# Patient Record
Sex: FEMALE | Race: WHITE | NOT HISPANIC OR LATINO | Employment: OTHER | URBAN - METROPOLITAN AREA
[De-identification: names, ages, dates, MRNs, and addresses within clinical notes are randomized per-mention and may not be internally consistent; named-entity substitution may affect disease eponyms.]

---

## 2017-01-03 NOTE — TELEPHONE ENCOUNTER
Was the patient seen in the last year in this department? Yes     Does patient have an active prescription for medications requested? No     Received Request Via: Pharmacy      Pt met protocol?: Yes    LAST OV 12/02/16

## 2017-01-04 RX ORDER — OMEPRAZOLE 40 MG/1
CAPSULE, DELAYED RELEASE ORAL
Qty: 90 CAP | Refills: 1 | Status: SHIPPED | OUTPATIENT
Start: 2017-01-04 | End: 2017-07-04 | Stop reason: SDUPTHER

## 2017-01-10 ENCOUNTER — NON-PROVIDER VISIT (OUTPATIENT)
Dept: MEDICAL GROUP | Facility: PHYSICIAN GROUP | Age: 67
End: 2017-01-10
Payer: MEDICARE

## 2017-01-10 DIAGNOSIS — Z23 NEED FOR VACCINATION: ICD-10-CM

## 2017-01-10 PROCEDURE — G0008 ADMIN INFLUENZA VIRUS VAC: HCPCS | Performed by: NURSE PRACTITIONER

## 2017-01-10 PROCEDURE — 90662 IIV NO PRSV INCREASED AG IM: CPT | Performed by: NURSE PRACTITIONER

## 2017-01-10 NOTE — MR AVS SNAPSHOT
Jacinda Haroon   1/10/2017 10:15 AM   Non-Provider Visit   MRN: [id number]    Department:  [facility]   Dept Phone:  [phone]    Description:  Female : 1950   Provider:  FLAKO RAE GRP, MA           Allergies as of 1/10/2017     No Known Allergies      You were diagnosed with     Need for vaccination   [[id number]]         Vital Signs     Smoking Status                   Never Smoker            Basic Information     Date Of Birth Sex Race Ethnicity Preferred Language    1950 Female White Non- English      Your appointments     2017  3:20 PM   Urgent/Same Day with Arcelia Walton M.D.   [facility] ([place])    [address] [address] [phone]           You will be receiving a confirmation call a few days before your appointment from our automated call confirmation system.            2017 12:40 PM   Established Patient Pul with A Rotation   [facility] (--)    [address] [facility] [address]  [facility] [address] [phone]              Problem List              ICD-10-CM Priority Class Noted - Resolved    H/O breast surgery Z98.890   3/12/2015 - Present    HLD (hyperlipidemia) E78.5   2015 - Present    Hormone replacement therapy (postmenopausal) Z79.890   2015 - Present    Thalassemia minor D56.3   2015 - Present    GERD (gastroesophageal reflux disease) K21.9   2016 - Present    Hoarseness R49.0   2016 - Present    Mild intermittent asthma without complication J45.20   2016 - Present      Health Maintenance        Date Due Completion Dates    IMM INFLUENZA (1) 2016 10/24/2015    MAMMOGRAM 2017, 2015, 2015, 2015, 2015, 2015, 2015, 2015, 2015, 2015, 2015, 2015, 2014 (Done)    Override on 2014: Done (with diagnostic due to breast pain, Left, but no abnormalities)    IMM DTaP/Tdap/Td Vaccine (2 - Td)  2/11/2019 2/11/2009    PAP SMEAR 6/7/2019 6/7/2016, 6/7/2016 (Done)    Override on 6/7/2016: Done    COLONOSCOPY 7/20/2020 7/20/2010 (Done), 7/13/2005 (Done)    Override on 7/20/2010: Done (est date)    Override on 7/13/2005: Done    BONE DENSITY 8/3/2020 8/3/2015, 8/12/2009 (Done)    Override on 8/12/2009: Done            Current Immunizations     13-VALENT PCV PREVNAR 12/3/2015    Influenza Vaccine Adult HD  Incomplete, 10/24/2015    Pneumococcal polysaccharide vaccine (PPSV-23) 7/17/2014    SHINGLES VACCINE 9/22/2009    Tdap Vaccine 2/11/2009      Below and/or attached are the medications your provider expects you to take. Review all of your home medications and newly ordered medications with your provider and/or pharmacist. Follow medication instructions as directed by your provider and/or pharmacist. Please keep your medication list with you and share with your provider. Update the information when medications are discontinued, doses are changed, or new medications (including over-the-counter products) are added; and carry medication information at all times in the event of emergency situations     Allergies:  No Known Allergies          Medications  Valid as of: January 10, 2017 - 10:50 AM    Generic Name Brand Name Tablet Size Instructions for use    Albuterol Sulfate (Aero Soln) albuterol 108 (90 BASE) MCG/ACT Inhale 2 Puffs by mouth every 6 hours as needed for Shortness of Breath.        Aspirin (Tablet Delayed Response) ECOTRIN 81 MG Take 81 mg by mouth every day.        Biotin   Take 6,000 mcg by mouth.        Budesonide-Formoterol Fumarate (Aerosol) SYMBICORT 160-4.5 MCG/ACT Take 2 Inhalation by mouth 2 Times a Day.        Cholecalciferol   Take 5,000 Units by mouth.        Coenzyme Q10   Take  by mouth. Liquid  100g        Cyanocobalamin   Take  by mouth.        CycloSPORINE (Emulsion) RESTASIS 0.05 % Place 1 Drop in both eyes 2 times a day.        Estradiol-Norethindrone Acet (Tab)  Estradiol-Norethindrone Acet 0.5-0.1 MG TAKE 1 TABLET DAILY        Flaxseed (Linseed)   Take  by mouth.        Ibuprofen (Tab) MOTRIN 600 MG Take 600 mg by mouth every 6 hours as needed.        Montelukast Sodium (Tab) SINGULAIR 10 MG TAKE 1 TABLET DAILY        Nutritional Supplements   Take  by mouth. Estadiol/noreth 1/0.5mg .1/2        Omeprazole (CAPSULE DELAYED RELEASE) PRILOSEC 40 MG TAKE 1 CAPSULE DAILY        Polyethylene Glycol 3350 (Powder) MIRALAX  Take 17 g by mouth every day.        Simvastatin (Tab) ZOCOR 20 MG TAKE 1 TABLET EVERY EVENING        Triazolam (Tab) HALCION 0.125 MG Take 1 Tab by mouth at bedtime as needed.        .                 Medicines prescribed today were sent to:     [facility] - [place], [place] - [address] [address]    Phone: [phone] Fax: [phone]    Open 24 Hours?: No    [facility] - [place] [address]    Phone: [phone] Fax: [phone]    Open 24 Hours?: No    [facility] - [place] [address]    Phone: [phone] Fax: [phone]    Open 24 Hours?: No      Medication refill instructions:       If your prescription bottle indicates you have medication refills left, it is not necessary to call your provider’s office. Please contact your pharmacy and they will refill your medication.    If your prescription bottle indicates you do not have any refills left, you may request refills at any time through one of the following ways: The online [employer] system (except Urgent Care), by calling your provider’s office, or by asking your pharmacy to contact your provider’s office with a refill request. Medication refills are processed only during regular business hours and may not be available until the next business day. Your provider may request additional information or to have a  follow-up visit with you prior to refilling your medication.   *Please Note: Medication refills are assigned a new Rx number when refilled electronically. Your pharmacy may indicate that no refills were authorized even though a new prescription for the same medication is available at the pharmacy. Please request the medicine by name with the pharmacy before contacting your provider for a refill.           [employer] Access Code: Activation code not generated  Current [place] [employer] Status: Active

## 2017-01-10 NOTE — PROGRESS NOTES
"Jacinda Patiño is a 66 y.o. female here for a non-provider visit for:   FLU    Reason for immunization: Annual Flu Vaccine  Immunization records indicate need for vaccine: Yes  Minimum interval has been met for this vaccine: Yes  ABN completed: Yes    VIS Dated   was given to patient Yes  All IAC Questionnaire questions were answered “No.\"    Patient tolerated injection and no adverse effects were observed or reported yes.    Pt scheduled for next dose in series: Not Indicated        "

## 2017-01-11 ENCOUNTER — TELEPHONE (OUTPATIENT)
Dept: MEDICAL GROUP | Facility: PHYSICIAN GROUP | Age: 67
End: 2017-01-11

## 2017-01-11 DIAGNOSIS — K40.20 BILATERAL INGUINAL HERNIA WITHOUT OBSTRUCTION OR GANGRENE, RECURRENCE NOT SPECIFIED: ICD-10-CM

## 2017-01-12 NOTE — TELEPHONE ENCOUNTER
Ok referral has been submitted to our referrals department, please allow 7-10 business days for them to get authorization from you insurance.

## 2017-01-12 NOTE — TELEPHONE ENCOUNTER
1. Caller Name: [facility]                                          Call Back Number:       Patient approves a detailed voicemail message: N\A    2. SPECIFIC Action To Be Taken: Referral pending, please sign.    3. Diagnosis/Clinical Reason for Request: hernia    4. Specialty & Provider Name/Lab/Imaging Location: [facility]    5. Is appointment scheduled for requested order/referral: no    Patient informed they will receive a return phone call from the office ONLY if there are any questions before processing their request. Advised to call back if they haven't received a call from the referral department in 5 days.

## 2017-01-26 ENCOUNTER — OFFICE VISIT (OUTPATIENT)
Dept: PULMONOLOGY | Facility: HOSPICE | Age: 67
End: 2017-01-26
Payer: MEDICARE

## 2017-01-26 VITALS
SYSTOLIC BLOOD PRESSURE: 136 MMHG | RESPIRATION RATE: 14 BRPM | HEIGHT: 64 IN | WEIGHT: 162 LBS | TEMPERATURE: 98.4 F | HEART RATE: 68 BPM | BODY MASS INDEX: 27.66 KG/M2 | OXYGEN SATURATION: 97 % | DIASTOLIC BLOOD PRESSURE: 80 MMHG

## 2017-01-26 DIAGNOSIS — J45.20 MILD INTERMITTENT ASTHMA WITHOUT COMPLICATION: ICD-10-CM

## 2017-01-26 PROCEDURE — G8420 CALC BMI NORM PARAMETERS: HCPCS | Performed by: INTERNAL MEDICINE

## 2017-01-26 PROCEDURE — 3014F SCREEN MAMMO DOC REV: CPT | Performed by: INTERNAL MEDICINE

## 2017-01-26 PROCEDURE — G8482 FLU IMMUNIZE ORDER/ADMIN: HCPCS | Performed by: INTERNAL MEDICINE

## 2017-01-26 PROCEDURE — 4040F PNEUMOC VAC/ADMIN/RCVD: CPT | Performed by: INTERNAL MEDICINE

## 2017-01-26 PROCEDURE — 99214 OFFICE O/P EST MOD 30 MIN: CPT | Performed by: INTERNAL MEDICINE

## 2017-01-26 PROCEDURE — 1036F TOBACCO NON-USER: CPT | Performed by: INTERNAL MEDICINE

## 2017-01-26 PROCEDURE — 3017F COLORECTAL CA SCREEN DOC REV: CPT | Performed by: INTERNAL MEDICINE

## 2017-01-26 PROCEDURE — G8432 DEP SCR NOT DOC, RNG: HCPCS | Performed by: INTERNAL MEDICINE

## 2017-01-26 PROCEDURE — 1101F PT FALLS ASSESS-DOCD LE1/YR: CPT | Performed by: INTERNAL MEDICINE

## 2017-01-26 RX ORDER — BUDESONIDE AND FORMOTEROL FUMARATE DIHYDRATE 80; 4.5 UG/1; UG/1
2 AEROSOL RESPIRATORY (INHALATION) 2 TIMES DAILY
Qty: 1 INHALER | Refills: 4 | Status: SHIPPED
Start: 2017-01-26 | End: 2017-02-13 | Stop reason: SDUPTHER

## 2017-01-26 NOTE — PROGRESS NOTES
Jacinda Patiño is a 66 y.o. female here for reactive airways with bronchopneumonia in July of last year. Patient was referred by her primary care doctor.    History of Present Illness:      This lady was doing well on maintenance steroid inhaler in July, then developed an infection and required a Z-Lalo and a steroid Dosepak. I rewrote those prescriptions that she likes to keep them on hand.    She also prefers Symbicort, Advair was not as beneficial, her physician in [place] was able to have that changed and justified, she would like the lower dosing and I changed her to Symbicort 80. We are not able to send it by E Scrip to [facility], so we printed it and are faxing it for the Scrip long-term prescription.    IgE and allergy testing was not remarkable, she does indicate a sensitivity to mites and danders. Currently she is well controlled back to her baseline state. Recheck in 6 months    Constitutional ROS: No unexpected change in weight, No unexplained fevers, sweats, or chills  Eyes: No change in vision or blurring or double vision  Mouth/Throat ROS: No sore throat, No recent change in voice or hoarseness  Pulmonary ROS: See present history for pertinent positives  Cardiovascular ROS: No chest pain  Gastrointestinal ROS: No abdominal pain, No abdominal bloating or early satiety  Musculoskeletal/Extremities ROS: No clubbing, No cyanosis  Hematologic/Lymphatic ROS: No abnormal bleeding  Neurologic ROS: No weakness  Psychiatric ROS: No depression  Allergic/Immunologic: No rhinitis or urticaria     Current Outpatient Prescriptions   Medication Sig Dispense Refill   • Naproxen (NAPROSYN PO) Take  by mouth.     • budesonide-formoterol (SYMBICORT) 80-4.5 MCG/ACT Aerosol Inhale 2 Puffs by mouth 2 Times a Day. Use spacer. Rinse mouth after each use. 1 Inhaler 4   • omeprazole (PRILOSEC) 40 MG delayed-release capsule TAKE 1 CAPSULE DAILY 90 Cap 1   • simvastatin (ZOCOR) 20 MG Tab TAKE 1 TABLET EVERY EVENING 90 Tab 0    • montelukast (SINGULAIR) 10 MG Tab TAKE 1 TABLET DAILY 90 Tab 0   • SYMBICORT 160-4.5 MCG/ACT Aerosol Take 2 Inhalation by mouth 2 Times a Day.     • triazolam (HALCION) 0.125 MG tablet Take 1 Tab by mouth at bedtime as needed. 45 Tab 0   • Estradiol-Norethindrone Acet 0.5-0.1 MG Tab TAKE 1 TABLET DAILY 84 Tab 1   • cyclosporin (RESTASIS) 0.05 % ophthalmic emulsion Place 1 Drop in both eyes 2 times a day.     • polyethylene glycol 3350 (MIRALAX) Powder Take 17 g by mouth every day.     • albuterol (VENTOLIN OR PROVENTIL) 108 (90 BASE) MCG/ACT Aero Soln inhalation aerosol Inhale 2 Puffs by mouth every 6 hours as needed for Shortness of Breath.     • ibuprofen (MOTRIN) 600 MG TABS Take 600 mg by mouth every 6 hours as needed.     • Nutritional Supplements (HRT SUPPORT PO) Take  by mouth. Estadiol/noreth 1/0.5mg .1/2     • VITAMIN D, CHOLECALCIFEROL, PO Take 5,000 Units by mouth.     • aspirin EC (ECOTRIN) 81 MG TBEC Take 81 mg by mouth every day.     • BIOTIN PO Take 6,000 mcg by mouth.     • Cyanocobalamin (VITAMIN B 12 PO) Take  by mouth.     • Flaxseed, Linseed, (FLAXSEED OIL PO) Take  by mouth.     • Coenzyme Q10 (CO Q 10 PO) Take  by mouth. Liquid  100g       No current facility-administered medications for this visit.       Social History   Substance Use Topics   • Smoking status: Never Smoker    • Smokeless tobacco: Never Used   • Alcohol Use: No        Past Medical History   Diagnosis Date   • HLD (hyperlipidemia) 7/7/2015   • Mild intermittent asthma without complication 7/19/2016       Past Surgical History   Procedure Laterality Date   • Lumpectomy Left 4/2012   • Basal cell excision  2010     Forehead    • Blepharoplasty  2010   • Shoulder arthroscopy  1992   • Laparoscopy  1984     AGA   • Hernia repair  1990     inguinal    • Knee arthroscopy Right 2015   • Pr remv 2nd cataract,corn-scler sectn         Allergies: Review of patient's allergies indicates no known allergies.    Family History   Problem  "Relation Age of Onset   • Heart Attack Mother    • Heart Disease Mother    • Hyperlipidemia Mother    • Hypertension Mother        Physical Examination    Filed Vitals:    01/26/17 1238   Height: 1.631 m (5' 4.2\")   Weight: 73.483 kg (162 lb)   Weight % change since last entry.: 0 %   BP: 136/80   Pulse: 68   BMI (Calculated): 27.63   Resp: 14   Temp: 36.9 °C (98.4 °F)       General Appearance: alert, no distress  Skin: Skin color, texture, turgor normal. No rashes or lesions.  Eyes: negative  Oropharynx: Lips, mucosa, and tongue normal. Teeth and gums normal. Oropharynx moist and without lesion  Lungs: positive findings: Clear  Heart: negative. RRR without murmur, gallop, or rubs.  No ectopy.  Abdomen: Abdomen soft, non-tender. BS normal. No masses,  No organomegaly  Extremities: Extremities normal. No deformities, edema, or skin discoloration  Peripheral Pulses: Normal  Neurologic: intact  No cervical adenopathy    II (soft palate, uvula, fauces visible)    Imaging: None presently    PFTS: None presently      Assessment and Plan  1. Mild intermittent asthma without complication  - budesonide-formoterol (SYMBICORT) 80-4.5 MCG/ACT Aerosol; Inhale 2 Puffs by mouth 2 Times a Day. Use spacer. Rinse mouth after each use.  Dispense: 1 Inhaler; Refill: 4    This lady was doing well on maintenance steroid inhaler in July, then developed an infection and required a Z-Lalo and a steroid Dosepak. I rewrote those prescriptions that she likes to keep them on hand.    She also prefers Symbicort, Advair was not as beneficial, her physician in [place] was able to have that changed and justified, she would like the lower dosing and I changed her to Symbicort 80. We are not able to send it by E Scrip to [facility], so we printed it and are faxing it for the Scrip long-term prescription.    IgE and allergy testing was not remarkable, she does indicate a sensitivity to mites and danders. Currently she is well controlled back to " her baseline state. Recheck in 6 months  Followup 6 mos

## 2017-01-26 NOTE — PATIENT INSTRUCTIONS
This lady was doing well on maintenance steroid inhaler in July, then developed an infection and required a Z-Lalo and a steroid Dosepak. I rewrote those prescriptions that she likes to keep them on hand.    She also prefers Symbicort, Advair was not as beneficial, her physician in [place] was able to have that changed and justified, she would like the lower dosing and I changed her to Symbicort 80. We are not able to send it by E Scrip to [facility], so we printed it and are faxing it for the Scrip long-term prescription.    IgE and allergy testing was not remarkable, she does indicate a sensitivity to mites and danders. Currently she is well controlled back to her baseline state. Recheck in 6 months

## 2017-01-26 NOTE — MR AVS SNAPSHOT
"Jacinda Patiño   2017 12:40 PM   Office Visit   MRN: [id number]    Department:  [facility]   Dept Phone:  [phone]    Description:  Female : 1950   Provider:  Elizabeth Abdullahi M.D.           Reason for Visit     Follow-Up Asthma      Allergies as of 2017     No Known Allergies      You were diagnosed with     Mild intermittent asthma without complication   [214153]         Vital Signs     Blood Pressure Pulse Temperature Respirations Height Weight    136/80 mmHg 68 36.9 °C (98.4 °F) 14 1.631 m (5' 4.2\") 73.483 kg (162 lb)    Body Mass Index Oxygen Saturation Smoking Status             27.62 kg/m2 97% Never Smoker          Basic Information     Date Of Birth Sex Race Ethnicity Preferred Language    1950 Female White Non- English      Problem List              ICD-10-CM Priority Class Noted - Resolved    H/O breast surgery Z98.890   3/12/2015 - Present    HLD (hyperlipidemia) E78.5   2015 - Present    Hormone replacement therapy (postmenopausal) Z79.890   2015 - Present    Thalassemia minor D56.3   2015 - Present    GERD (gastroesophageal reflux disease) K21.9   2016 - Present    Hoarseness R49.0   2016 - Present    Mild intermittent asthma without complication J45.20   2016 - Present      Health Maintenance        Date Due Completion Dates    MAMMOGRAM 2017, 2015, 2015, 2015, 2015, 2015, 2015, 2015, 2015, 2015, 2015, 2015, 2014 (Done)    Override on 2014: Done (with diagnostic due to breast pain, Left, but no abnormalities)    IMM DTaP/Tdap/Td Vaccine (2 - Td) 2019    PAP SMEAR 2019, 2016 (Done)    Override on 2016: Done    COLONOSCOPY 2020 (Done), 2005 (Done)    Override on 2010: Done (est date)    Override on 2005: Done    BONE DENSITY 8/3/2020 8/3/2015, 2009 (Done)    Override on 2009: " Done            Current Immunizations     13-VALENT PCV PREVNAR 12/3/2015    Influenza Vaccine Adult HD 1/10/2017, 10/24/2015    Pneumococcal polysaccharide vaccine (PPSV-23) 7/17/2014    SHINGLES VACCINE 9/22/2009    Tdap Vaccine 2/11/2009      Below and/or attached are the medications your provider expects you to take. Review all of your home medications and newly ordered medications with your provider and/or pharmacist. Follow medication instructions as directed by your provider and/or pharmacist. Please keep your medication list with you and share with your provider. Update the information when medications are discontinued, doses are changed, or new medications (including over-the-counter products) are added; and carry medication information at all times in the event of emergency situations     Allergies:  No Known Allergies          Medications  Valid as of: January 26, 2017 - 12:57 PM    Generic Name Brand Name Tablet Size Instructions for use    Albuterol Sulfate (Aero Soln) albuterol 108 (90 BASE) MCG/ACT Inhale 2 Puffs by mouth every 6 hours as needed for Shortness of Breath.        Aspirin (Tablet Delayed Response) ECOTRIN 81 MG Take 81 mg by mouth every day.        Biotin   Take 6,000 mcg by mouth.        Budesonide-Formoterol Fumarate (Aerosol) SYMBICORT 160-4.5 MCG/ACT Take 2 Inhalation by mouth 2 Times a Day.        Cholecalciferol   Take 5,000 Units by mouth.        Coenzyme Q10   Take  by mouth. Liquid  100g        Cyanocobalamin   Take  by mouth.        CycloSPORINE (Emulsion) RESTASIS 0.05 % Place 1 Drop in both eyes 2 times a day.        Estradiol-Norethindrone Acet (Tab) Estradiol-Norethindrone Acet 0.5-0.1 MG TAKE 1 TABLET DAILY        Flaxseed (Linseed)   Take  by mouth.        Ibuprofen (Tab) MOTRIN 600 MG Take 600 mg by mouth every 6 hours as needed.        Montelukast Sodium (Tab) SINGULAIR 10 MG TAKE 1 TABLET DAILY        Naproxen   Take  by mouth.        Nutritional Supplements   Take  by  mouth. Estadiol/noreth 1/0.5mg .1/2        Omeprazole (CAPSULE DELAYED RELEASE) PRILOSEC 40 MG TAKE 1 CAPSULE DAILY        Polyethylene Glycol 3350 (Powder) MIRALAX  Take 17 g by mouth every day.        Simvastatin (Tab) ZOCOR 20 MG TAKE 1 TABLET EVERY EVENING        Triazolam (Tab) HALCION 0.125 MG Take 1 Tab by mouth at bedtime as needed.        .                 Medicines prescribed today were sent to:     [facility] - [place], [place] - [address] [facility]    [address] [place] [address] [place] [address]    Phone: [phone] Fax: [phone]    Open 24 Hours?: No    [facility] - [address] [address]    Phone: [phone] Fax: [phone]    Open 24 Hours?: No      Medication refill instructions:       If your prescription bottle indicates you have medication refills left, it is not necessary to call your provider’s office. Please contact your pharmacy and they will refill your medication.    If your prescription bottle indicates you do not have any refills left, you may request refills at any time through one of the following ways: The online [employer] system (except Urgent Care), by calling your provider’s office, or by asking your pharmacy to contact your provider’s office with a refill request. Medication refills are processed only during regular business hours and may not be available until the next business day. Your provider may request additional information or to have a follow-up visit with you prior to refilling your medication.   *Please Note: Medication refills are assigned a new Rx number when refilled electronically. Your pharmacy may indicate that no refills were authorized even though a new prescription for the same medication is available at the pharmacy. Please request the medicine by name with the pharmacy before contacting your provider for a refill.           [employer] Access Code: Activation code not generated  Current [employer]  Status: Active

## 2017-02-13 DIAGNOSIS — J45.20 MILD INTERMITTENT ASTHMA WITHOUT COMPLICATION: ICD-10-CM

## 2017-02-13 RX ORDER — BUDESONIDE AND FORMOTEROL FUMARATE DIHYDRATE 80; 4.5 UG/1; UG/1
2 AEROSOL RESPIRATORY (INHALATION) 2 TIMES DAILY
Qty: 3 INHALER | Refills: 0 | Status: SHIPPED | OUTPATIENT
Start: 2017-02-13 | End: 2017-07-14 | Stop reason: SDUPTHER

## 2017-02-13 NOTE — TELEPHONE ENCOUNTER
Have we ever prescribed this med? Yes.  If yes, what date? 01/26/2016    Last OV: 01/26/2016    Next OV: none scheduled; not due for 6 months     DX: Asthma    Medications: Symbicort    Needs to be 90 day supply; Dr. Abdullahi [facility] just one inhaler last time

## 2017-02-22 RX ORDER — ESTRADIOL AND NORETHINDRONE ACETATE .5; .1 MG/1; MG/1
TABLET ORAL
Qty: 84 TAB | Refills: 0 | Status: SHIPPED | OUTPATIENT
Start: 2017-02-22 | End: 2017-05-15 | Stop reason: SDUPTHER

## 2017-02-22 NOTE — TELEPHONE ENCOUNTER
Was the patient seen in the last year in this department? Yes     Does patient have an active prescription for medications requested? No     Received Request Via: Pharmacy      Pt met protocol?: Yes, LABS & OV PCP 12/16, OV OP 1/16

## 2017-03-27 RX ORDER — MONTELUKAST SODIUM 10 MG/1
10 TABLET ORAL DAILY
Qty: 90 TAB | Refills: 0 | Status: SHIPPED | OUTPATIENT
Start: 2017-03-27 | End: 2017-06-25 | Stop reason: SDUPTHER

## 2017-03-27 RX ORDER — SIMVASTATIN 20 MG
20 TABLET ORAL EVERY EVENING
Qty: 90 TAB | Refills: 0 | Status: SHIPPED | OUTPATIENT
Start: 2017-03-27 | End: 2017-06-25 | Stop reason: SDUPTHER

## 2017-05-15 RX ORDER — ESTRADIOL AND NORETHINDRONE ACETATE .5; .1 MG/1; MG/1
TABLET ORAL
Qty: 84 TAB | Refills: 0 | Status: SHIPPED | OUTPATIENT
Start: 2017-05-15 | End: 2017-08-07 | Stop reason: SDUPTHER

## 2017-06-25 DIAGNOSIS — E78.5 HYPERLIPIDEMIA, UNSPECIFIED HYPERLIPIDEMIA TYPE: ICD-10-CM

## 2017-06-26 RX ORDER — MONTELUKAST SODIUM 10 MG/1
TABLET ORAL
Qty: 90 TAB | Refills: 3 | Status: SHIPPED | OUTPATIENT
Start: 2017-06-26 | End: 2018-01-04 | Stop reason: SDUPTHER

## 2017-06-26 RX ORDER — SIMVASTATIN 20 MG
TABLET ORAL
Qty: 90 TAB | Refills: 1 | Status: SHIPPED | OUTPATIENT
Start: 2017-06-26 | End: 2017-12-04 | Stop reason: SDUPTHER

## 2017-06-26 NOTE — TELEPHONE ENCOUNTER
Was the patient seen in the last year in this department? Yes     Does patient have an active prescription for medications requested? No     Received Request Via: Pharmacy      Pt met protocol?: No/ASTHMA    LAST OV 12/02/2016    BP Readings from Last 1 Encounters:   01/26/17 136/80     No results found for: HBA1C     Lab Results   Component Value Date/Time    HDL 62 06/08/2016 06:33 AM

## 2017-06-26 NOTE — TELEPHONE ENCOUNTER
Pt has had OV within the 12 month protocol and lipid panel is current. 6 month supply sent to pharmacy.   Lab Results   Component Value Date/Time    CHOLESTEROL, 06/08/2016 06:33 AM    LDL 60 06/08/2016 06:33 AM    HDL 62 06/08/2016 06:33 AM    TRIGLYCERIDES 57 06/08/2016 06:33 AM       Lab Results   Component Value Date/Time    SODIUM 140 12/02/2016 07:59 AM    POTASSIUM 4.3 12/02/2016 07:59 AM    CHLORIDE 105 12/02/2016 07:59 AM    CO2 29 12/02/2016 07:59 AM    GLUCOSE 95 12/02/2016 07:59 AM    BUN 13 12/02/2016 07:59 AM    CREATININE 0.92 12/02/2016 07:59 AM     Lab Results   Component Value Date/Time    ALKALINE PHOSPHATASE 52 12/02/2016 07:59 AM    AST(SGOT) 23 12/02/2016 07:59 AM    ALT(SGPT) 19 12/02/2016 07:59 AM    TOTAL BILIRUBIN 0.9 12/02/2016 07:59 AM

## 2017-07-05 NOTE — TELEPHONE ENCOUNTER
Was the patient seen in the last year in this department? Yes 12/02/2016    Does patient have an active prescription for medications requested? No     Received Request Via: Pharmacy

## 2017-07-06 RX ORDER — OMEPRAZOLE 40 MG/1
CAPSULE, DELAYED RELEASE ORAL
Qty: 90 CAP | Refills: 0 | Status: SHIPPED | OUTPATIENT
Start: 2017-07-06 | End: 2017-10-04 | Stop reason: SDUPTHER

## 2017-07-14 DIAGNOSIS — J45.20 MILD INTERMITTENT ASTHMA WITHOUT COMPLICATION: ICD-10-CM

## 2017-07-14 RX ORDER — BUDESONIDE AND FORMOTEROL FUMARATE DIHYDRATE 80; 4.5 UG/1; UG/1
2 AEROSOL RESPIRATORY (INHALATION) 2 TIMES DAILY
Qty: 1 INHALER | Refills: 5 | Status: SHIPPED | OUTPATIENT
Start: 2017-07-14 | End: 2018-05-25

## 2017-07-14 RX ORDER — BUDESONIDE AND FORMOTEROL FUMARATE DIHYDRATE 80; 4.5 UG/1; UG/1
2 AEROSOL RESPIRATORY (INHALATION) 2 TIMES DAILY
COMMUNITY
End: 2018-01-04 | Stop reason: SDUPTHER

## 2017-07-14 NOTE — TELEPHONE ENCOUNTER
Caller Name: Jacinda Patiño                 Call Back Number: [phone] (home)         Patient approves a detailed voicemail message: yes    Have we ever prescribed this med? Yes.  If yes, what date? 2/13/17    Last OV: 1/26/17    Next OV: 6 month follow up- no appt on file    DX: Asthma    Medications:  Symbicort 80    Current Outpatient Prescriptions   Medication Sig Dispense Refill   • budesonide-formoterol (SYMBICORT) 80-4.5 MCG/ACT Aerosol Inhale 2 Puffs by mouth 2 Times a Day.     • omeprazole (PRILOSEC) 40 MG delayed-release capsule TAKE 1 CAPSULE DAILY 90 Cap 0   • PROAIR  (90 BASE) MCG/ACT Aero Soln inhalation aerosol USE 2 INHALATIONS EVERY 6 HOURS AS NEEDED FOR SHORTNESS OF BREATH 25.5 g 0   • simvastatin (ZOCOR) 20 MG Tab TAKE 1 TABLET EVERY EVENING 90 Tab 1   • montelukast (SINGULAIR) 10 MG Tab TAKE 1 TABLET DAILY 90 Tab 3   • Estradiol-Norethindrone Acet 0.5-0.1 MG Tab TAKE 1 TABLET DAILY 84 Tab 0   • budesonide-formoterol (SYMBICORT) 80-4.5 MCG/ACT Aerosol Inhale 2 Puffs by mouth 2 Times a Day. Use spacer. Rinse mouth after each use. 3 Inhaler 0   • Naproxen (NAPROSYN PO) Take  by mouth.     • SYMBICORT 160-4.5 MCG/ACT Aerosol Take 2 Inhalation by mouth 2 Times a Day.     • triazolam (HALCION) 0.125 MG tablet Take 1 Tab by mouth at bedtime as needed. 45 Tab 0   • cyclosporin (RESTASIS) 0.05 % ophthalmic emulsion Place 1 Drop in both eyes 2 times a day.     • polyethylene glycol 3350 (MIRALAX) Powder Take 17 g by mouth every day.     • ibuprofen (MOTRIN) 600 MG TABS Take 600 mg by mouth every 6 hours as needed.     • Nutritional Supplements (HRT SUPPORT PO) Take  by mouth. Estadiol/noreth 1/0.5mg .1/2     • VITAMIN D, CHOLECALCIFEROL, PO Take 5,000 Units by mouth.     • aspirin EC (ECOTRIN) 81 MG TBEC Take 81 mg by mouth every day.     • BIOTIN PO Take 6,000 mcg by mouth.     • Cyanocobalamin (VITAMIN B 12 PO) Take  by mouth.     • Coenzyme Q10 (CO Q 10 PO) Take  by mouth. Liquid  100g     •  Flaxseed, Linseed, (FLAXSEED OIL PO) Take  by mouth.       No current facility-administered medications for this visit.

## 2017-07-20 ENCOUNTER — TELEPHONE (OUTPATIENT)
Dept: PULMONOLOGY | Facility: HOSPICE | Age: 67
End: 2017-07-20

## 2017-07-20 NOTE — TELEPHONE ENCOUNTER
Called rx for Symbicort 80 (7/14/17) into [facility] for a 90 day w/ 3 refills, spoke donte/ michaelle Gleason was notified and states that she did receive only one inhaler from her local pharmacy due to her being out but does not wish to fill the other 5 refills on file.

## 2017-08-07 RX ORDER — ESTRADIOL AND NORETHINDRONE ACETATE .5; .1 MG/1; MG/1
TABLET ORAL
Qty: 84 TAB | Refills: 0 | Status: SHIPPED | OUTPATIENT
Start: 2017-08-07 | End: 2017-12-04 | Stop reason: SDUPTHER

## 2017-08-07 NOTE — TELEPHONE ENCOUNTER
Refilled x 3 months. Please advise patient to schedule follow-up for future fills. Current on mammo from 9/16.

## 2017-08-16 ENCOUNTER — APPOINTMENT (OUTPATIENT)
Dept: PULMONOLOGY | Facility: HOSPICE | Age: 67
End: 2017-08-16
Payer: MEDICARE

## 2017-09-29 ENCOUNTER — NON-PROVIDER VISIT (OUTPATIENT)
Dept: MEDICAL GROUP | Facility: PHYSICIAN GROUP | Age: 67
End: 2017-09-29
Payer: MEDICARE

## 2017-09-29 DIAGNOSIS — Z23 NEED FOR VACCINATION: ICD-10-CM

## 2017-09-29 PROCEDURE — 90662 IIV NO PRSV INCREASED AG IM: CPT | Performed by: FAMILY MEDICINE

## 2017-09-29 PROCEDURE — G0008 ADMIN INFLUENZA VIRUS VAC: HCPCS | Performed by: FAMILY MEDICINE

## 2017-09-29 NOTE — PROGRESS NOTES
"Jacinda Patiño is a 67 y.o. female here for a non-provider visit for:   FLU    Reason for immunization: Annual Flu Vaccine  Immunization records indicate need for vaccine: Yes, confirmed with Epic  Minimum interval has been met for this vaccine: Yes  ABN completed: Yes    Order and dose verified by: MB  VIS Dated  8/7/115 was given to patient: Yes  All IAC Questionnaire questions were answered \"No.\"    Patient tolerated injection and no adverse effects were observed or reported: Yes    Pt scheduled for next dose in series: Not Indicated    "

## 2017-10-05 RX ORDER — OMEPRAZOLE 40 MG/1
CAPSULE, DELAYED RELEASE ORAL
Qty: 90 CAP | Refills: 0 | Status: SHIPPED | OUTPATIENT
Start: 2017-10-05 | End: 2017-12-04 | Stop reason: SDUPTHER

## 2017-10-05 NOTE — TELEPHONE ENCOUNTER
Was the patient seen in the last year in this department? Yes     Does patient have an active prescription for medications requested? No     Received Request Via: Pharmacy      Pt met protocol?: Yes pt last ov 12/16

## 2017-10-05 NOTE — TELEPHONE ENCOUNTER
Please advise pt that she will need to make an leydi in the next few months. Will send 3 months to pharmacy.

## 2017-10-18 ENCOUNTER — HOSPITAL ENCOUNTER (OUTPATIENT)
Dept: RADIOLOGY | Facility: MEDICAL CENTER | Age: 67
End: 2017-10-18
Attending: FAMILY MEDICINE
Payer: MEDICARE

## 2017-10-18 DIAGNOSIS — Z12.39 BREAST SCREENING: ICD-10-CM

## 2017-10-18 PROCEDURE — G0202 SCR MAMMO BI INCL CAD: HCPCS

## 2017-11-27 ENCOUNTER — APPOINTMENT (OUTPATIENT)
Dept: RADIOLOGY | Facility: IMAGING CENTER | Age: 67
End: 2017-11-27
Attending: NURSE PRACTITIONER
Payer: MEDICARE

## 2017-11-27 ENCOUNTER — OFFICE VISIT (OUTPATIENT)
Dept: PULMONOLOGY | Facility: HOSPICE | Age: 67
End: 2017-11-27
Payer: MEDICARE

## 2017-11-27 VITALS
RESPIRATION RATE: 16 BRPM | HEIGHT: 64 IN | BODY MASS INDEX: 31.28 KG/M2 | WEIGHT: 183.2 LBS | OXYGEN SATURATION: 92 % | HEART RATE: 107 BPM | DIASTOLIC BLOOD PRESSURE: 90 MMHG | SYSTOLIC BLOOD PRESSURE: 144 MMHG

## 2017-11-27 DIAGNOSIS — K21.9 GASTROESOPHAGEAL REFLUX DISEASE, ESOPHAGITIS PRESENCE NOT SPECIFIED: ICD-10-CM

## 2017-11-27 DIAGNOSIS — J45.31 MILD PERSISTENT ASTHMA WITH ACUTE EXACERBATION: ICD-10-CM

## 2017-11-27 PROCEDURE — 99214 OFFICE O/P EST MOD 30 MIN: CPT | Performed by: NURSE PRACTITIONER

## 2017-11-27 PROCEDURE — 71020 DX-CHEST-2 VIEWS: CPT | Mod: TC | Performed by: NURSE PRACTITIONER

## 2017-11-27 RX ORDER — ALBUTEROL SULFATE 2.5 MG/3ML
2.5 SOLUTION RESPIRATORY (INHALATION) EVERY 4 HOURS PRN
Qty: 120 BULLET | Refills: 5 | Status: SHIPPED | OUTPATIENT
Start: 2017-11-27 | End: 2018-05-16

## 2017-11-27 RX ORDER — PREDNISONE 10 MG/1
TABLET ORAL
Qty: 30 TAB | Refills: 0 | Status: SHIPPED | OUTPATIENT
Start: 2017-11-27 | End: 2017-12-04 | Stop reason: SDUPTHER

## 2017-11-27 NOTE — PATIENT INSTRUCTIONS
Plan:    1) Chest xray today in the office is clear. Complete current course of Azithromycin. Prolonged Prednisone taper dose.   2) Sputum cultures now  3) Continue Symbicort and Proair inhalers. Continue Singulair 10 mg qhs. Add home nebulizer with Albuterol 0.083% 1 unit dose per neb qid prn and recommend addition of Mucinex OTC to assist with mucous clearance.  4) She is up to date on Prevnar 13, Pneumovax 23 and Influenza vaccinations.  5) Follow up in 4 weeks, sooner if acute symptoms persist or worsen.

## 2017-11-27 NOTE — PROGRESS NOTES
Chief Complaint   Patient presents with   • Asthma       HPI:  Jacinda Patiño is a 67 y.o. year old female here today for follow-up on her Asthma with recent recurrent exacerbations. She was last seen in the office in January 2017 with Dr. Elizabeth Abdullahi. PFT's were last in March 2016 and indicated an FEV1 of 2.33 L, 96% predicted with an FEV1/FVC ratio of 80 with a DLCO of 132% predicted. She is currently compliant on Symbicort 80/4.5 mcg 2 puffs INH bid along with a Proair HFA inhaler. She is also on Singulair. She states she has had recurrent exacerbations over the past few months requiring 2 course of Azithromyin along with a Prednisone taper. She completed a Prednisone taper 4 days ago. She has 2 more of the Azithromycin left. She states prior to the past few months she felt her Asthma had been well controlled. She has had recent travel to [place] and [place]. She states she was at [place] until Friday. She states she has had increased dyspnea worse when laying flat at night. She has had an increased cough worse over the past few days. She has had intermittent coughing fits. She has had trouble clearing mucous. She states if she does produce mucous it is clear to light yellow in color. She has had an increased wheeze. She denies current fevers or chills. She is on a PPI for reflux. She denies breakthrough reflux. She denies increased sinus drainage.         Past Medical History:   Diagnosis Date   • HLD (hyperlipidemia) 7/7/2015   • Mild intermittent asthma without complication 7/19/2016       Past Surgical History:   Procedure Laterality Date   • KNEE ARTHROSCOPY Right 2015   • LUMPECTOMY Left 4/2012   • BASAL CELL EXCISION  2010    Forehead    • BLEPHAROPLASTY  2010   • SHOULDER ARTHROSCOPY  1992   • HERNIA REPAIR  1990    inguinal    • LAPAROSCOPY  1984    AGA   • PB REMV 2ND CATARACT,CORN-SCLER SECTN         Family History   Problem Relation Age of Onset   • Heart Attack Mother    • Heart Disease Mother     • Hyperlipidemia Mother    • Hypertension Mother        Social History     Social History   • Marital status:      Spouse name: N/A   • Number of children: N/A   • Years of education: N/A     Occupational History   • Education       Retired     Social History Main Topics   • Smoking status: Never Smoker   • Smokeless tobacco: Never Used   • Alcohol use No   • Drug use: No   • Sexual activity: Not on file     Other Topics Concern   • Not on file     Social History Narrative   • No narrative on file         ROS:  Constitutional: Denies fevers, chills, sweats, fatigue, weight loss  Eyes: Denies glasses, vision loss, pain, drainage, double vision  Ears/Nose/Mouth/Throat: Denies ear ache, difficulty hearing, sore throat, persistent hoarseness, decayed teeth/toothache  Cardiovascular: Denies chest pain, tightness, palpitations, swelling in feet/legs, fainting, difficulty breathing when laying down  Respiratory: See HPI   GI: Denies heartburn, difficulty swallowing, nausea, vomiting, abdominal pain, diarrhea, constipation  : Denies frequent urination, painful urination  Integumentary: Denies rashes, lumps or color changes  MSK: Denies painful joints, sore muscles, and back pain.   Neurological: Denies frequent headaches, dizziness, weakness        Current Outpatient Prescriptions   Medication Sig Dispense Refill   • omeprazole (PRILOSEC) 40 MG delayed-release capsule TAKE 1 CAPSULE DAILY 90 Cap 0   • Estradiol-Norethindrone Acet 0.5-0.1 MG Tab TAKE 1 TABLET DAILY 84 Tab 0   • budesonide-formoterol (SYMBICORT) 80-4.5 MCG/ACT Aerosol Inhale 2 Puffs by mouth 2 Times a Day.     • budesonide-formoterol (SYMBICORT) 80-4.5 MCG/ACT Aerosol Inhale 2 Puffs by mouth 2 Times a Day. Use spacer. Rinse mouth after each use. 1 Inhaler 5   • PROAIR  (90 BASE) MCG/ACT Aero Soln inhalation aerosol USE 2 INHALATIONS EVERY 6 HOURS AS NEEDED FOR SHORTNESS OF BREATH 25.5 g 0   • simvastatin (ZOCOR) 20 MG Tab TAKE 1 TABLET EVERY  "EVENING 90 Tab 1   • montelukast (SINGULAIR) 10 MG Tab TAKE 1 TABLET DAILY 90 Tab 3   • Naproxen (NAPROSYN PO) Take  by mouth.     • triazolam (HALCION) 0.125 MG tablet Take 1 Tab by mouth at bedtime as needed. 45 Tab 0   • cyclosporin (RESTASIS) 0.05 % ophthalmic emulsion Place 1 Drop in both eyes 2 times a day.     • polyethylene glycol 3350 (MIRALAX) Powder Take 17 g by mouth every day.     • ibuprofen (MOTRIN) 600 MG TABS Take 600 mg by mouth every 6 hours as needed.     • Nutritional Supplements (HRT SUPPORT PO) Take  by mouth. Estadiol/noreth 1/0.5mg .1/2     • VITAMIN D, CHOLECALCIFEROL, PO Take 5,000 Units by mouth.     • aspirin EC (ECOTRIN) 81 MG TBEC Take 81 mg by mouth every day.     • BIOTIN PO Take 6,000 mcg by mouth.     • Cyanocobalamin (VITAMIN B 12 PO) Take  by mouth.     • Coenzyme Q10 (CO Q 10 PO) Take  by mouth. Liquid  100g     • Flaxseed, Linseed, (FLAXSEED OIL PO) Take  by mouth.     • SYMBICORT 160-4.5 MCG/ACT Aerosol Take 2 Inhalation by mouth 2 Times a Day.       No current facility-administered medications for this visit.        No Known Allergies    Blood pressure 144/90, pulse (!) 107, resp. rate 16, height 1.631 m (5' 4.2\"), weight 83.1 kg (183 lb 3.2 oz), SpO2 92 %.    PE:   Appearance: Well developed, well nourished, no acute distress  Eyes: PERRL, EOM intact, sclera white, conjunctiva moist  Ears: no lesions or deformities  Hearing: grossly intact  Nose: no lesions or deformities  Oropharynx: tongue normal, posterior pharynx without erythema or exudate  Neck: supple, trachea midline, no masses   Respiratory effort: no intercostal retractions or use of accessory muscles  Lung auscultation: no rales, rhonchi or wheezes, slightly prolonged expiratory phase   Heart auscultation: no murmur rub or gallop  Extremities: no cyanosis or edema  Abdomen: soft ,non tender, no masses  Gait and Station: normal  Digits and nails: no clubbing, cyanosis, petechiae or nodes.  Cranial nerves: grossly " intact  Skin: no rashes, lesions or ulcers noted  Orientation: Oriented to time, person and place  Mood and affect: mood and affect appropriate, normal interaction with examiner  Judgement: Intact          Assessment:  1. Mild persistent asthma with acute exacerbation  DX-CHEST-2 VIEWS    AFB CULTURE    FUNGAL CULTURE    CULTURE RESPIRATORY W/ GRM STN    DME NEBULIZER    albuterol (PROVENTIL) 2.5mg/3ml Nebu Soln solution for nebulization    predniSONE (DELTASONE) 10 MG Tab   2. Gastroesophageal reflux disease, esophagitis presence not specified           Plan:    1) Chest xray today in the office is clear. Complete current course of Azithromycin. Prolonged Prednisone taper dose.   2) Sputum cultures now  3) Continue Symbicort and Proair inhalers. Continue Singulair 10 mg qhs. Add home nebulizer with Albuterol 0.083% 1 unit dose per neb qid prn and recommend addition of Mucinex OTC to assist with mucous clearance.  4) She is up to date on Prevnar 13, Pneumovax 23 and Influenza vaccinations.  5) Follow up in 4 weeks, sooner if acute symptoms persist or worsen.

## 2017-11-28 ENCOUNTER — HOSPITAL ENCOUNTER (OUTPATIENT)
Dept: LAB | Facility: MEDICAL CENTER | Age: 67
End: 2017-11-28
Attending: NURSE PRACTITIONER
Payer: MEDICARE

## 2017-11-28 ENCOUNTER — HOSPITAL ENCOUNTER (OUTPATIENT)
Facility: MEDICAL CENTER | Age: 67
End: 2017-11-28
Attending: NURSE PRACTITIONER
Payer: MEDICARE

## 2017-11-28 DIAGNOSIS — E78.5 HYPERLIPIDEMIA, UNSPECIFIED HYPERLIPIDEMIA TYPE: ICD-10-CM

## 2017-11-28 DIAGNOSIS — J45.31 MILD PERSISTENT ASTHMA WITH ACUTE EXACERBATION: ICD-10-CM

## 2017-11-28 LAB
CHOLEST SERPL-MCNC: 167 MG/DL (ref 100–199)
HDLC SERPL-MCNC: 72 MG/DL
LDLC SERPL CALC-MCNC: 81 MG/DL
TRIGL SERPL-MCNC: 71 MG/DL (ref 0–149)

## 2017-11-28 PROCEDURE — 87070 CULTURE OTHR SPECIMN AEROBIC: CPT

## 2017-11-28 PROCEDURE — 36415 COLL VENOUS BLD VENIPUNCTURE: CPT

## 2017-11-28 PROCEDURE — 87206 SMEAR FLUORESCENT/ACID STAI: CPT

## 2017-11-28 PROCEDURE — 87015 SPECIMEN INFECT AGNT CONCNTJ: CPT

## 2017-11-28 PROCEDURE — 87116 MYCOBACTERIA CULTURE: CPT

## 2017-11-28 PROCEDURE — 87205 SMEAR GRAM STAIN: CPT

## 2017-11-28 PROCEDURE — 80061 LIPID PANEL: CPT

## 2017-11-28 PROCEDURE — 87102 FUNGUS ISOLATION CULTURE: CPT

## 2017-11-29 ENCOUNTER — PATIENT MESSAGE (OUTPATIENT)
Dept: PULMONOLOGY | Facility: HOSPICE | Age: 67
End: 2017-11-29

## 2017-11-29 LAB
GRAM STN SPEC: NORMAL
RHODAMINE-AURAMINE STN SPEC: NORMAL
SIGNIFICANT IND 70042: NORMAL
SIGNIFICANT IND 70042: NORMAL
SITE SITE: NORMAL
SITE SITE: NORMAL
SOURCE SOURCE: NORMAL
SOURCE SOURCE: NORMAL

## 2017-11-29 NOTE — TELEPHONE ENCOUNTER
Did she start the Prednisone? Has she started the home nebulizer? Sputum cultures are still pending.   If she is taking the Prednisone and using the nebulizer every 4-6 hours, but symptoms have not improved then I would recommend ER/Urgent Care for further work up.

## 2017-11-29 NOTE — TELEPHONE ENCOUNTER
From: Jacinda Patiño  To: JANELL Grider  Sent: 11/29/2017 5:06 AM PST  Subject: Procedure Question    Dear Ms. Camilo,  I saw you on Monday and my symptoms have not improved. Still unable to lay down at night. Last night slept sitting up from 9-11 (then had to get up) and again 1:30-4:30. Still congestion, lung pressure, mucous and breathing difficulty even when walking from one room to another. Last tab of z- pack taken today. What would you recommend as my next step? Thank you.  Regards,  Jacinda Patiño  [birth year]

## 2017-11-30 LAB
BACTERIA SPEC RESP CULT: NORMAL
SIGNIFICANT IND 70042: NORMAL
SITE SITE: NORMAL
SOURCE SOURCE: NORMAL

## 2017-12-02 ENCOUNTER — APPOINTMENT (OUTPATIENT)
Dept: RADIOLOGY | Facility: MEDICAL CENTER | Age: 67
End: 2017-12-02
Attending: EMERGENCY MEDICINE
Payer: MEDICARE

## 2017-12-02 ENCOUNTER — HOSPITAL ENCOUNTER (EMERGENCY)
Facility: MEDICAL CENTER | Age: 67
End: 2017-12-02
Attending: EMERGENCY MEDICINE
Payer: MEDICARE

## 2017-12-02 VITALS
TEMPERATURE: 98 F | HEIGHT: 65 IN | WEIGHT: 178.13 LBS | RESPIRATION RATE: 16 BRPM | BODY MASS INDEX: 29.68 KG/M2 | SYSTOLIC BLOOD PRESSURE: 139 MMHG | OXYGEN SATURATION: 91 % | HEART RATE: 64 BPM | DIASTOLIC BLOOD PRESSURE: 69 MMHG

## 2017-12-02 DIAGNOSIS — R06.00 DYSPNEA, UNSPECIFIED TYPE: ICD-10-CM

## 2017-12-02 LAB
ALBUMIN SERPL BCP-MCNC: 4.2 G/DL (ref 3.2–4.9)
ALBUMIN/GLOB SERPL: 1.4 G/DL
ALP SERPL-CCNC: 57 U/L (ref 30–99)
ALT SERPL-CCNC: 21 U/L (ref 2–50)
ANION GAP SERPL CALC-SCNC: 10 MMOL/L (ref 0–11.9)
AST SERPL-CCNC: 29 U/L (ref 12–45)
BASOPHILS # BLD AUTO: 0.2 % (ref 0–1.8)
BASOPHILS # BLD: 0.01 K/UL (ref 0–0.12)
BILIRUB SERPL-MCNC: 0.7 MG/DL (ref 0.1–1.5)
BNP SERPL-MCNC: 11 PG/ML (ref 0–100)
BUN SERPL-MCNC: 11 MG/DL (ref 8–22)
CALCIUM SERPL-MCNC: 9.3 MG/DL (ref 8.5–10.5)
CHLORIDE SERPL-SCNC: 109 MMOL/L (ref 96–112)
CO2 SERPL-SCNC: 21 MMOL/L (ref 20–33)
CREAT SERPL-MCNC: 0.77 MG/DL (ref 0.5–1.4)
EOSINOPHIL # BLD AUTO: 0 K/UL (ref 0–0.51)
EOSINOPHIL NFR BLD: 0 % (ref 0–6.9)
ERYTHROCYTE [DISTWIDTH] IN BLOOD BY AUTOMATED COUNT: 39.6 FL (ref 35.9–50)
GFR SERPL CREATININE-BSD FRML MDRD: >60 ML/MIN/1.73 M 2
GLOBULIN SER CALC-MCNC: 3.1 G/DL (ref 1.9–3.5)
GLUCOSE SERPL-MCNC: 120 MG/DL (ref 65–99)
HCT VFR BLD AUTO: 33.8 % (ref 37–47)
HGB BLD-MCNC: 10.9 G/DL (ref 12–16)
IMM GRANULOCYTES # BLD AUTO: 0.04 K/UL (ref 0–0.11)
IMM GRANULOCYTES NFR BLD AUTO: 0.6 % (ref 0–0.9)
LYMPHOCYTES # BLD AUTO: 0.89 K/UL (ref 1–4.8)
LYMPHOCYTES NFR BLD: 13.9 % (ref 22–41)
MCH RBC QN AUTO: 20.8 PG (ref 27–33)
MCHC RBC AUTO-ENTMCNC: 32.2 G/DL (ref 33.6–35)
MCV RBC AUTO: 64.4 FL (ref 81.4–97.8)
MONOCYTES # BLD AUTO: 0.31 K/UL (ref 0–0.85)
MONOCYTES NFR BLD AUTO: 4.9 % (ref 0–13.4)
NEUTROPHILS # BLD AUTO: 5.14 K/UL (ref 2–7.15)
NEUTROPHILS NFR BLD: 80.4 % (ref 44–72)
NRBC # BLD AUTO: 0 K/UL
NRBC BLD AUTO-RTO: 0 /100 WBC
PLATELET # BLD AUTO: 275 K/UL (ref 164–446)
PMV BLD AUTO: 10 FL (ref 9–12.9)
POTASSIUM SERPL-SCNC: 3.7 MMOL/L (ref 3.6–5.5)
PROT SERPL-MCNC: 7.3 G/DL (ref 6–8.2)
RBC # BLD AUTO: 5.25 M/UL (ref 4.2–5.4)
SODIUM SERPL-SCNC: 140 MMOL/L (ref 135–145)
WBC # BLD AUTO: 6.4 K/UL (ref 4.8–10.8)

## 2017-12-02 PROCEDURE — 36415 COLL VENOUS BLD VENIPUNCTURE: CPT

## 2017-12-02 PROCEDURE — 99284 EMERGENCY DEPT VISIT MOD MDM: CPT

## 2017-12-02 PROCEDURE — 80053 COMPREHEN METABOLIC PANEL: CPT

## 2017-12-02 PROCEDURE — 71010 DX-CHEST-LIMITED (1 VIEW): CPT

## 2017-12-02 PROCEDURE — 83880 ASSAY OF NATRIURETIC PEPTIDE: CPT

## 2017-12-02 PROCEDURE — 700117 HCHG RX CONTRAST REV CODE 255: Performed by: EMERGENCY MEDICINE

## 2017-12-02 PROCEDURE — 71275 CT ANGIOGRAPHY CHEST: CPT

## 2017-12-02 PROCEDURE — 87040 BLOOD CULTURE FOR BACTERIA: CPT

## 2017-12-02 PROCEDURE — 85025 COMPLETE CBC W/AUTO DIFF WBC: CPT

## 2017-12-02 PROCEDURE — 94760 N-INVAS EAR/PLS OXIMETRY 1: CPT

## 2017-12-02 RX ADMIN — IOHEXOL 60 ML: 350 INJECTION, SOLUTION INTRAVENOUS at 04:37

## 2017-12-02 ASSESSMENT — ENCOUNTER SYMPTOMS
FEVER: 0
NAUSEA: 0
BACK PAIN: 0
ABDOMINAL PAIN: 0
COUGH: 1
CONFUSION: 0
SHORTNESS OF BREATH: 1
FATIGUE: 0
VOMITING: 0
DIZZINESS: 0
PALPITATIONS: 0

## 2017-12-02 ASSESSMENT — PAIN SCALES - GENERAL: PAINLEVEL_OUTOF10: 0

## 2017-12-02 NOTE — DISCHARGE INSTRUCTIONS
Shortness of Breath  Shortness of breath means you have trouble breathing. It could also mean that you have a medical problem. You should get immediate medical care for shortness of breath.  CAUSES   · Not enough oxygen in the air such as with high altitudes or a smoke-filled room.  · Certain lung diseases, infections, or problems.  · Heart disease or conditions, such as angina or heart failure.  · Low red blood cells (anemia).  · Poor physical fitness, which can cause shortness of breath when you exercise.  · Chest or back injuries or stiffness.  · Being overweight.  · Smoking.  · Anxiety, which can make you feel like you are not getting enough air.  DIAGNOSIS   Serious medical problems can often be found during your physical exam. Tests may also be done to determine why you are having shortness of breath. Tests may include:  · Chest X-rays.  · Lung function tests.  · Blood tests.  · An electrocardiogram (ECG).  · An ambulatory electrocardiogram. An ambulatory ECG records your heartbeat patterns over a 24-hour period.  · Exercise testing.  · A transthoracic echocardiogram (TTE). During echocardiography, sound waves are used to evaluate how blood flows through your heart.  · A transesophageal echocardiogram (ROSEANNA).  · Imaging scans.  Your health care provider may not be able to find a cause for your shortness of breath after your exam. In this case, it is important to have a follow-up exam with your health care provider as directed.   TREATMENT   Treatment for shortness of breath depends on the cause of your symptoms and can vary greatly.  HOME CARE INSTRUCTIONS   · Do not smoke. Smoking is a common cause of shortness of breath. If you smoke, ask for help to quit.  · Avoid being around chemicals or things that may bother your breathing, such as paint fumes and dust.  · Rest as needed. Slowly resume your usual activities.  · If medicines were prescribed, take them as directed for the full length of time directed. This  includes oxygen and any inhaled medicines.  · Keep all follow-up appointments as directed by your health care provider.  SEEK MEDICAL CARE IF:   · Your condition does not improve in the time expected.  · You have a hard time doing your normal activities even with rest.  · You have any new symptoms.  SEEK IMMEDIATE MEDICAL CARE IF:   · Your shortness of breath gets worse.  · You feel light-headed, faint, or develop a cough not controlled with medicines.  · You start coughing up blood.  · You have pain with breathing.  · You have chest pain or pain in your arms, shoulders, or abdomen.  · You have a fever.  · You are unable to walk up stairs or exercise the way you normally do.  MAKE SURE YOU:  · Understand these instructions.  · Will watch your condition.  · Will get help right away if you are not doing well or get worse.     This information is not intended to replace advice given to you by your health care provider. Make sure you discuss any questions you have with your health care provider.     Document Released: 09/12/2002 Document Revised: 12/23/2014 Document Reviewed: 03/04/2013  [employer] Interactive Patient Education ©2016 [employer]

## 2017-12-02 NOTE — ED NOTES
"Pt ambulatory to room. Pt has been SOB having all week. Seen by pulmonary doctor early this week and was started on a new nebulizer and mucinex. Pt used all medication today w/o relief. Hx of asthma. Congested productive cough w/ green sputum. Denies flu-like sx. Pt speaking in full sentences w/o discomfort, no apparent SOB.     Chief Complaint   Patient presents with   • Shortness of Breath     /69   Pulse 83   Temp 36.7 °C (98 °F) (Temporal)   Resp 16   Ht 1.651 m (5' 5\")   Wt 80.8 kg (178 lb 2.1 oz)   SpO2 91%   BMI 29.64 kg/m²     Pt placed in lobby, updated on triage process. Pt educated to notified RN or triage tech if changes in condition.      "

## 2017-12-02 NOTE — ED PROVIDER NOTES
ED Provider Note    ED Provider Note          CHIEF COMPLAINT  Chief Complaint   Patient presents with   • Shortness of Breath       HPI  Jacinda Patiño is a 67 y.o. female who presents to the Emergency Department for SOB for one week. She has a history of asthma and has had a recent viral bronchitis last month. She took zpak and prednisone in Nov 5th when in [place] and got better but got back about a week ago and stated to have symptoms again. Saw pulmonologist on Monday ( Elizabeth Abdullahi) and did sputum test and started her on nebulizer and singular and on more steroids. Has not been able to sleep flat for over a week now. She started to feel more tight tonight around 9pm and went to bed at 1030pm and had worsening SOB then this week but while sitting up feels better. She has never had to go to the ER ever for asthma.     REVIEW OF SYSTEMS  Review of Systems   Constitutional: Negative for fatigue and fever.   HENT: Positive for congestion.    Respiratory: Positive for cough and shortness of breath.    Cardiovascular: Negative for chest pain, palpitations and leg swelling.   Gastrointestinal: Negative for abdominal pain, nausea and vomiting.   Genitourinary: Negative for difficulty urinating.   Musculoskeletal: Negative for back pain.   Neurological: Negative for dizziness.   Psychiatric/Behavioral: Negative for confusion.       PAST MEDICAL HISTORY   has a past medical history of HLD (hyperlipidemia) (7/7/2015) and Mild intermittent asthma without complication (7/19/2016).    SURGICAL HISTORY   has a past surgical history that includes lumpectomy (Left, 4/2012); basal cell excision (2010); blepharoplasty (2010); shoulder arthroscopy (1992); laparoscopy (1984); hernia repair (1990); knee arthroscopy (Right, 2015); and remv 2nd cataract,corn-scler sectn.    SOCIAL HISTORY  Social History   Substance Use Topics   • Smoking status: Never Smoker   • Smokeless tobacco: Never Used   • Alcohol use No      History   Drug  "Use No       FAMILY HISTORY  Family History   Problem Relation Age of Onset   • Heart Attack Mother    • Heart Disease Mother    • Hyperlipidemia Mother    • Hypertension Mother        CURRENT MEDICATIONS  Reviewed.  See Encounter Summary.     ALLERGIES  No Known Allergies    PHYSICAL EXAM  VITAL SIGNS: /69   Pulse 64   Temp 36.7 °C (98 °F) (Temporal)   Resp 16   Ht 1.651 m (5' 5\")   Wt 80.8 kg (178 lb 2.1 oz)   SpO2 91%   BMI 29.64 kg/m²   Physical Exam   Constitutional: She is oriented to person, place, and time.   HENT:   Head: Normocephalic and atraumatic.   Eyes: Pupils are equal, round, and reactive to light.   Neck: Normal range of motion.   Cardiovascular: Normal rate.    Pulmonary/Chest: Effort normal. No respiratory distress. She has no wheezes.   Abdominal: Soft.   Musculoskeletal: Normal range of motion. She exhibits no edema.   Neurological: She is alert and oriented to person, place, and time.   Skin: Skin is warm. She is not diaphoretic.   Psychiatric: She has a normal mood and affect.           DIAGNOSTIC STUDIES / PROCEDURES     LABS  Labs Reviewed   CBC WITH DIFFERENTIAL - Abnormal; Notable for the following:        Result Value    Hemoglobin 10.9 (*)     Hematocrit 33.8 (*)     MCV 64.4 (*)     MCH 20.8 (*)     MCHC 32.2 (*)     Neutrophils-Polys 80.40 (*)     Lymphocytes 13.90 (*)     Lymphs (Absolute) 0.89 (*)     All other components within normal limits   COMP METABOLIC PANEL - Abnormal; Notable for the following:     Glucose 120 (*)     All other components within normal limits   BTYPE NATRIURETIC PEPTIDE   BLOOD CULTURE    Narrative:     Per Hospital Policy: Only change Specimen Src: to \"Line\" if  specified by physician order.   ESTIMATED GFR   BLOOD CULTURE       All labs were reviewed by me.        RADIOLOGY  CT-CTA CHEST PULMONARY ARTERY W/ RECONS   Final Result      1.  No central or segmental pulmonary embolus   2.  No evidence of other intrathoracic pathology          " "  DX-CHEST-LIMITED (1 VIEW)   Final Result      No acute cardiac or pulmonary abnormalities are identified.        The radiologist's interpretation of all radiological studies have been reviewed by me.    COURSE & MEDICAL DECISION MAKING  Pertinent Labs & Imaging studies reviewed. (See chart for details)    2:50 AM - Patient seen and examined at bedside.     Differential Diagnosis: Asthma exacerbation, PE, pleural effusions, CHF, viral syndrome, bronchitis, pneumonia    Decision Making:  This is a 67 y.o. year old female who presents with concern of shortness of breath. She has a long history of asthma and has been being treated outpatient both Z-Paks and steroids and still is having a productive cough that makes her feel more short of breath when she lays flat. She was unsure about any hypoxia. She has no respiratory distress. She doesn't have any significant wheezing even on exam. With her recent upper respiratory illness and her cough pneumonia with how the differential versus pleural effusions however chest x-ray showed no effusions and no pneumonia. Labs showed some anemia which is chronic because she has thalassemia trait but no other significant findings. Her BNP was normal. She recently did have a trip to [place] and she is on hormone replacement therapy which makes her more high risk for pulmonary embolism therefore a CT scan was done. CT scan showed no pulmonary embolism, airspace disease, effusions or consolidations.  I went to reevaluate the patient and she saw no hypoxia. Sitting up she feels very well. I did lay her flat and watched on the monitor and she denies any hypoxia when she laid flat either. Actually her oxygenation improved when she laid flat despite her feeling like she had to sit up abruptly to \"cough up the mucus\". Other things were considered such as gastric reflux peptic ulcer disease contributing to this worsening symptoms when laying flat. I recommend that she start taking her Prilosec " again since she is not been taking it regularly. She does have an appointment on Monday with her PCP and will follow up with her pulmonologist as well as her this week. At this time I do not have any clear explanation other than possibly a viral bronchitis for her cough and shortness of breath without any chest pain, normal CT and otherwise unremarkable labs she can be discharged home given strict return precautions with her PCP and pulmonology.    DISPOSITION:  Patient will be discharged home in stable condition.    FOLLOW UP:  Arcelia Walton M.D.  [address] [place] [id number] [phone]      as scheduled     [facility], Emergency Dept  [address] [facility] [id number] [phone]    If symptoms worsen        FINAL IMPRESSION  1. Dyspnea, unspecified type

## 2017-12-02 NOTE — ED NOTES
Pt ambulatory  Vital signs stable  Pt handed d/c paperwork with understanding stated  Pt states will follow up with PCP  Pt  states safe way home

## 2017-12-03 ENCOUNTER — PATIENT OUTREACH (OUTPATIENT)
Dept: HEALTH INFORMATION MANAGEMENT | Facility: OTHER | Age: 67
End: 2017-12-03

## 2017-12-03 NOTE — PROGRESS NOTES
Placed discharge outreach phone call to patient s/p ER discharge 12/2/17.  Left voicemail providing my contact information and instructions to call with any questions or concerns.     12/3//17 at 11:47 AM--Received incoming VM from pt at 11:40 AM.  Placed phone call to patient, discharge outreach completed.

## 2017-12-04 ENCOUNTER — OFFICE VISIT (OUTPATIENT)
Dept: MEDICAL GROUP | Facility: PHYSICIAN GROUP | Age: 67
End: 2017-12-04
Payer: MEDICARE

## 2017-12-04 VITALS
SYSTOLIC BLOOD PRESSURE: 120 MMHG | TEMPERATURE: 97.5 F | OXYGEN SATURATION: 97 % | RESPIRATION RATE: 14 BRPM | WEIGHT: 175 LBS | DIASTOLIC BLOOD PRESSURE: 68 MMHG | BODY MASS INDEX: 29.88 KG/M2 | HEART RATE: 90 BPM | HEIGHT: 64 IN

## 2017-12-04 DIAGNOSIS — J45.41 MODERATE PERSISTENT ASTHMA WITH EXACERBATION: ICD-10-CM

## 2017-12-04 DIAGNOSIS — J45.31 MILD PERSISTENT ASTHMA WITH ACUTE EXACERBATION: ICD-10-CM

## 2017-12-04 DIAGNOSIS — E78.5 HYPERLIPIDEMIA, UNSPECIFIED HYPERLIPIDEMIA TYPE: ICD-10-CM

## 2017-12-04 DIAGNOSIS — F51.01 PRIMARY INSOMNIA: ICD-10-CM

## 2017-12-04 DIAGNOSIS — K21.9 GASTROESOPHAGEAL REFLUX DISEASE, ESOPHAGITIS PRESENCE NOT SPECIFIED: ICD-10-CM

## 2017-12-04 DIAGNOSIS — Z79.890 HORMONE REPLACEMENT THERAPY (POSTMENOPAUSAL): ICD-10-CM

## 2017-12-04 PROCEDURE — 99214 OFFICE O/P EST MOD 30 MIN: CPT | Performed by: FAMILY MEDICINE

## 2017-12-04 RX ORDER — SIMVASTATIN 20 MG
TABLET ORAL
Qty: 90 TAB | Refills: 3 | Status: SHIPPED | OUTPATIENT
Start: 2017-12-04 | End: 2019-01-15

## 2017-12-04 RX ORDER — OMEPRAZOLE 40 MG/1
CAPSULE, DELAYED RELEASE ORAL
Qty: 90 CAP | Refills: 3 | Status: SHIPPED | OUTPATIENT
Start: 2017-12-04 | End: 2018-05-08

## 2017-12-04 RX ORDER — TRIAZOLAM 0.12 MG/1
0.12 TABLET ORAL NIGHTLY PRN
Qty: 90 TAB | Refills: 1 | Status: SHIPPED
Start: 2017-12-04 | End: 2018-12-05 | Stop reason: SDUPTHER

## 2017-12-04 RX ORDER — PREDNISONE 10 MG/1
TABLET ORAL
Qty: 63 TAB | Refills: 0 | Status: SHIPPED | OUTPATIENT
Start: 2017-12-04 | End: 2018-05-16

## 2017-12-04 RX ORDER — AZITHROMYCIN 250 MG/1
TABLET, FILM COATED ORAL
COMMUNITY
Start: 2017-11-25 | End: 2017-12-06

## 2017-12-04 RX ORDER — ESTRADIOL AND NORETHINDRONE ACETATE .5; .1 MG/1; MG/1
TABLET ORAL
Qty: 84 TAB | Refills: 3 | Status: SHIPPED | OUTPATIENT
Start: 2017-12-04 | End: 2019-02-19 | Stop reason: SDUPTHER

## 2017-12-04 ASSESSMENT — PATIENT HEALTH QUESTIONNAIRE - PHQ9: CLINICAL INTERPRETATION OF PHQ2 SCORE: 0

## 2017-12-04 NOTE — PROGRESS NOTES
Chief Complaint   Patient presents with   • Follow-Up     ER    • Medication Refill     simvastatin, omeprazole, hrt, triazolam       HISTORY OF PRESENT ILLNESS: Patient is a 67 y.o. female established patient here today for the following concerns:    1. Moderate persistent asthma with exacerbation  Here today for ER follow up.  Was seen in the ER after having had bronchitis like symptoms with asthma exacerbation for the last 3 weeks.  The most recent imaging did not find any PNA or PE.  She had been traveling and had several sick contacts.  She was having difficulty lying flat.  BNP was negative.  Cardiac enzymes were negative.  She did get advise to bump up her prednisone to 60 mg yesterday and finally this am is feeling like she is turning the corner.  She is using her nebs every 4 hours with improvement and good expectoration.     2. Primary insomnia  Due for refill on Halcion to use as needed for travel.  She has not used it during this episode.  She typically uses it a few times per month.     3. Hormone replacement therapy (postmenopausal)  Continues on HRT for symptom control.  Mammo is up to date and normal.     4. Hyperlipidemia, unspecified hyperlipidemia type  Continues on simvastatin with good control of her levels on most recent labs.     5. Gastroesophageal reflux disease, esophagitis presence not specified  Restarted Omeprazole at the request of ERP.  She does have hx of large hiatal hernia for which she was offered surgical correction previously.  She had been taking the PPI only prn.  Her asthma symptoms were much worse with lying flat.          Past Medical, Social, and Family history reviewed and updated in EPIC    Allergies:Patient has no known allergies.    Current Outpatient Prescriptions   Medication Sig Dispense Refill   • Probiotic Product (ALIGN PO) Take  by mouth.     • Nasal Wash (ALKALOL) Solution Spray  in nose.     • GuaiFENesin (MUCINEX PO) Take  by mouth.     • azithromycin  (ZITHROMAX) 250 MG Tab      • omeprazole (PRILOSEC) 40 MG delayed-release capsule TAKE 1 CAPSULE DAILY 90 Cap 3   • simvastatin (ZOCOR) 20 MG Tab TAKE 1 TABLET EVERY EVENING 90 Tab 3   • triazolam (HALCION) 0.125 MG tablet Take 1 Tab by mouth at bedtime as needed. 90 Tab 1   • Estradiol-Norethindrone Acet 0.5-0.1 MG Tab TAKE 1 TABLET DAILY 84 Tab 3   • predniSONE (DELTASONE) 10 MG Tab Take 60mg x 3days, then take 50 mg x 3 days, then take 40 mg x 3 days, 30 mg x 3 days, 20 mg x 3 days, 10 mg x 3 days then stop 63 Tab 0   • albuterol (PROVENTIL) 2.5mg/3ml Nebu Soln solution for nebulization 3 mL by Nebulization route every four hours as needed for Shortness of Breath. 120 Bullet 5   • budesonide-formoterol (SYMBICORT) 80-4.5 MCG/ACT Aerosol Inhale 2 Puffs by mouth 2 Times a Day. Use spacer. Rinse mouth after each use. 1 Inhaler 5   • PROAIR  (90 BASE) MCG/ACT Aero Soln inhalation aerosol USE 2 INHALATIONS EVERY 6 HOURS AS NEEDED FOR SHORTNESS OF BREATH 25.5 g 0   • montelukast (SINGULAIR) 10 MG Tab TAKE 1 TABLET DAILY 90 Tab 3   • Naproxen (NAPROSYN PO) Take  by mouth.     • cyclosporin (RESTASIS) 0.05 % ophthalmic emulsion Place 1 Drop in both eyes 2 times a day.     • ibuprofen (MOTRIN) 600 MG TABS Take 600 mg by mouth every 6 hours as needed.     • VITAMIN D, CHOLECALCIFEROL, PO Take 5,000 Units by mouth.     • aspirin EC (ECOTRIN) 81 MG TBEC Take 81 mg by mouth every day.     • Cyanocobalamin (VITAMIN B 12 PO) Take  by mouth.     • Flaxseed, Linseed, (FLAXSEED OIL PO) Take  by mouth.     • budesonide-formoterol (SYMBICORT) 80-4.5 MCG/ACT Aerosol Inhale 2 Puffs by mouth 2 Times a Day.     • SYMBICORT 160-4.5 MCG/ACT Aerosol Take 2 Inhalation by mouth 2 Times a Day.     • polyethylene glycol 3350 (MIRALAX) Powder Take 17 g by mouth every day.     • Nutritional Supplements (HRT SUPPORT PO) Take  by mouth. Estadiol/noreth 1/0.5mg .1/2     • BIOTIN PO Take 6,000 mcg by mouth.     • Coenzyme Q10 (CO Q 10 PO)  "Take  by mouth. Liquid  100g       No current facility-administered medications for this visit.          ROS:  Review of Systems   Constitutional: Negative for fever, chills, weight loss and malaise/fatigue.   HENT: Negative for ear pain, nosebleeds, congestion, sore throat and neck pain.    Eyes: Negative for blurred vision.   Respiratory:+ for cough, sputum production, shortness of breath and wheezing.    Cardiovascular: Negative for chest pain, palpitations,  and leg swelling.   Gastrointestinal: Negative for heartburn, nausea, vomiting, diarrhea and abdominal pain.   Genitourinary: Negative for dysuria, urgency and frequency.   Musculoskeletal: Negative for myalgias, back pain and joint pain.   Skin: Negative for rash and itching.   Neurological: Negative for dizziness, tingling, tremors, sensory change, focal weakness and headaches.   Endo/Heme/Allergies: Does not bruise/bleed easily.   Psychiatric/Behavioral: Negative for depression, anxiety, suicidal ideas, insomnia and memory loss.      Exam:  Blood pressure 120/68, pulse 90, temperature 36.4 °C (97.5 °F), resp. rate 14, height 1.631 m (5' 4.2\"), weight 79.4 kg (175 lb), SpO2 97 %.    General:  Well nourished, well developed in NAD  Head is grossly normal.  Neck: Supple without JVD   Pulmonary:  Normal effort. Expiratory wheezes throughout the lung fields  Cardiovascular: Regular rate  Extremities: no clubbing, cyanosis, or edema.  Psych: affect appropriate      Please note that this dictation was created using voice recognition software. I have made every reasonable attempt to correct obvious errors, but I expect that there are errors of grammar and possibly content that I did not discover before finalizing the note.    Assessment/Plan:  1. Moderate persistent asthma with exacerbation  Continue inhaled medications, advised to continue with higher prednisone taper new Rx given.   Keep follow up with Pulmonology    2. Primary insomnia  Renewed to use " sparingly.   - triazolam (HALCION) 0.125 MG tablet; Take 1 Tab by mouth at bedtime as needed.  Dispense: 90 Tab; Refill: 1    3. Hormone replacement therapy (postmenopausal)  Continue HRT, annual mammography     4. Hyperlipidemia, unspecified hyperlipidemia type  Continue simvastatin     5. Gastroesophageal reflux disease, esophagitis presence not specified  Continue daily PPI>       3-6 month follow up

## 2017-12-06 ENCOUNTER — OFFICE VISIT (OUTPATIENT)
Dept: PULMONOLOGY | Facility: HOSPICE | Age: 67
End: 2017-12-06
Payer: MEDICARE

## 2017-12-06 VITALS
BODY MASS INDEX: 30.32 KG/M2 | HEIGHT: 64 IN | OXYGEN SATURATION: 96 % | WEIGHT: 177.6 LBS | RESPIRATION RATE: 16 BRPM | DIASTOLIC BLOOD PRESSURE: 72 MMHG | HEART RATE: 80 BPM | SYSTOLIC BLOOD PRESSURE: 126 MMHG

## 2017-12-06 DIAGNOSIS — R06.02 SHORTNESS OF BREATH: ICD-10-CM

## 2017-12-06 DIAGNOSIS — J45.41 MODERATE PERSISTENT ASTHMA WITH ACUTE EXACERBATION: ICD-10-CM

## 2017-12-06 DIAGNOSIS — K21.9 GASTROESOPHAGEAL REFLUX DISEASE, ESOPHAGITIS PRESENCE NOT SPECIFIED: ICD-10-CM

## 2017-12-06 PROCEDURE — 99214 OFFICE O/P EST MOD 30 MIN: CPT | Performed by: NURSE PRACTITIONER

## 2017-12-06 NOTE — PROGRESS NOTES
Chief Complaint   Patient presents with   • Shortness of Breath   • Asthma       HPI:  Jacinda Patiño is a 67 y.o. year old female here today for follow-up on her Asthma with recent recurrent exacerbations. She was last seen in the office in 11/27/2017 with an Asthma exacerbation. Chest xray at that time was clear. She had completed 2 course of azithromycin a long with a Prednisone taper dose. She was placed on a prolonged Prednisone taper dose and a home nebulizer with Albuterol 0.083% was added. She is currently compliant on Symbicort 80/4.5 mcg 2 puffs INH bid along with a Proair HFA inhaler. She is also on Singulair. She states prior to the past few months she felt her Asthma had been well controlled. She has had recent travel to [place] and [place]. She states she was at [place] until Friday. Sputum cultures were performed and are negative to date. She was seen in the ER 12/2/2017 for worsening dyspnea. Chest xray was clear. CTA was negative for PE. Resting RA saturation today in the office is 96%. She states she was not feeling better and called in to our on call doctor. Dr. Velásquez increased her Prednisone to 50 mg. She saw her PCP on Monday and was increased to 60 mg of Prednisone. She has been using her nebulizer every 4 hours. She states finally this morning she is feeling better. She went 6 hours between nebulizer treatments. She finished her Azithromycin this morning. She feels her chest congestion has improved. She has had worsening dyspnea when laying flat, but this has improved as well. She sleeping slightly elevated. She states her mucous is now light yellow in color. She feels her dyspnea has improved. She denies current fevers.     PFT's were last in March 2016 and indicated an FEV1 of 2.33 L, 96% predicted with an FEV1/FVC ratio of 80 with a DLCO of 132% predicted. She is on a PPI for reflux. She denies breakthrough reflux. She denies increased sinus drainage.       Past Medical History:    Diagnosis Date   • HLD (hyperlipidemia) 7/7/2015   • Mild intermittent asthma without complication 7/19/2016       Past Surgical History:   Procedure Laterality Date   • KNEE ARTHROSCOPY Right 2015   • LUMPECTOMY Left 4/2012   • BASAL CELL EXCISION  2010    Forehead    • BLEPHAROPLASTY  2010   • SHOULDER ARTHROSCOPY  1992   • HERNIA REPAIR  1990    inguinal    • LAPAROSCOPY  1984    AGA   • PB REMV 2ND CATARACT,CORN-SCLER SECTN         Family History   Problem Relation Age of Onset   • Heart Attack Mother    • Heart Disease Mother    • Hyperlipidemia Mother    • Hypertension Mother        Social History     Social History   • Marital status:      Spouse name: N/A   • Number of children: N/A   • Years of education: N/A     Occupational History   • Education       Retired     Social History Main Topics   • Smoking status: Never Smoker   • Smokeless tobacco: Never Used   • Alcohol use No   • Drug use: No   • Sexual activity: Not on file     Other Topics Concern   • Not on file     Social History Narrative   • No narrative on file         ROS:  Constitutional: Denies fevers, chills, sweats, fatigue, weight loss  Eyes: Denies glasses, vision loss, pain, drainage, double vision  Ears/Nose/Mouth/Throat: Denies ear ache, difficulty hearing, sore throat, persistent hoarseness, decayed teeth/toothache  Cardiovascular: Denies chest pain, tightness, palpitations, swelling in feet/legs, fainting, difficulty breathing when laying down  Respiratory: See HPI   GI: Denies difficulty swallowing, nausea, vomiting, abdominal pain, diarrhea, constipation  : Denies frequent urination, painful urination  Integumentary: Denies rashes, lumps or color changes  MSK: Denies painful joints, sore muscles, and back pain.   Neurological: Denies frequent headaches, dizziness, weakness        Current Outpatient Prescriptions   Medication Sig Dispense Refill   • Probiotic Product (ALIGN PO) Take  by mouth.     • GuaiFENesin (MUCINEX PO)  Take  by mouth.     • azithromycin (ZITHROMAX) 250 MG Tab      • omeprazole (PRILOSEC) 40 MG delayed-release capsule TAKE 1 CAPSULE DAILY 90 Cap 3   • simvastatin (ZOCOR) 20 MG Tab TAKE 1 TABLET EVERY EVENING 90 Tab 3   • Estradiol-Norethindrone Acet 0.5-0.1 MG Tab TAKE 1 TABLET DAILY 84 Tab 3   • predniSONE (DELTASONE) 10 MG Tab Take 60mg x 3days, then take 50 mg x 3 days, then take 40 mg x 3 days, 30 mg x 3 days, 20 mg x 3 days, 10 mg x 3 days then stop 63 Tab 0   • albuterol (PROVENTIL) 2.5mg/3ml Nebu Soln solution for nebulization 3 mL by Nebulization route every four hours as needed for Shortness of Breath. 120 Bullet 5   • budesonide-formoterol (SYMBICORT) 80-4.5 MCG/ACT Aerosol Inhale 2 Puffs by mouth 2 Times a Day. Use spacer. Rinse mouth after each use. 1 Inhaler 5   • PROAIR  (90 BASE) MCG/ACT Aero Soln inhalation aerosol USE 2 INHALATIONS EVERY 6 HOURS AS NEEDED FOR SHORTNESS OF BREATH 25.5 g 0   • montelukast (SINGULAIR) 10 MG Tab TAKE 1 TABLET DAILY 90 Tab 3   • cyclosporin (RESTASIS) 0.05 % ophthalmic emulsion Place 1 Drop in both eyes 2 times a day.     • VITAMIN D, CHOLECALCIFEROL, PO Take 5,000 Units by mouth.     • aspirin EC (ECOTRIN) 81 MG TBEC Take 81 mg by mouth every day.     • Cyanocobalamin (VITAMIN B 12 PO) Take  by mouth.     • Flaxseed, Linseed, (FLAXSEED OIL PO) Take  by mouth.     • Nasal Wash (ALKALOL) Solution Spray  in nose.     • triazolam (HALCION) 0.125 MG tablet Take 1 Tab by mouth at bedtime as needed. 90 Tab 1   • budesonide-formoterol (SYMBICORT) 80-4.5 MCG/ACT Aerosol Inhale 2 Puffs by mouth 2 Times a Day.     • Naproxen (NAPROSYN PO) Take  by mouth.     • SYMBICORT 160-4.5 MCG/ACT Aerosol Take 2 Inhalation by mouth 2 Times a Day.     • polyethylene glycol 3350 (MIRALAX) Powder Take 17 g by mouth every day.     • ibuprofen (MOTRIN) 600 MG TABS Take 600 mg by mouth every 6 hours as needed.     • Nutritional Supplements (HRT SUPPORT PO) Take  by mouth. Estadiol/noreth  "1/0.5mg .1/2     • BIOTIN PO Take 6,000 mcg by mouth.     • Coenzyme Q10 (CO Q 10 PO) Take  by mouth. Liquid  100g       No current facility-administered medications for this visit.        No Known Allergies    Blood pressure 126/72, pulse 80, resp. rate 16, height 1.631 m (5' 4.2\"), weight 80.6 kg (177 lb 9.6 oz), SpO2 96 %.    PE:   Appearance: Well developed, well nourished, no acute distress  Eyes: PERRL, EOM intact, sclera white, conjunctiva moist  Ears: no lesions or deformities  Hearing: grossly intact  Nose: no lesions or deformities  Oropharynx: tongue normal, posterior pharynx without erythema or exudate  Neck: supple, trachea midline, no masses   Respiratory effort: no intercostal retractions or use of accessory muscles  Lung auscultation: Diminished with scattered expiratory wheezing  Heart auscultation: no murmur rub or gallop  Extremities: no cyanosis or edema  Abdomen: soft ,non tender, no masses  Gait and Station: normal  Digits and nails: no clubbing, cyanosis, petechiae or nodes.  Cranial nerves: grossly intact  Skin: no rashes, lesions or ulcers noted  Orientation: Oriented to time, person and place  Mood and affect: mood and affect appropriate, normal interaction with examiner  Judgement: Intact          Assessment:  1. Moderate persistent asthma with acute exacerbation     2. Shortness of breath     3. Gastroesophageal reflux disease, esophagitis presence not specified           Plan:    1) She states just in the last 24 hours is she feeling better. She has completed the Azithromycin. She took 60 mg of Prednisone this morning. We will work on a slow prolonged Prednisone wean with 40 mg x 3 days, 30 mg x 3 days, 20 mg x 3 days, then 10 mg x 3 days. Continue Mucinex OTC. Continue nebulizer treatments every 6-8 hours as needed. Continue Symbicort bid. We discussed stepping up from 80 mcg to 160 mcg. She would like to hold off as prior to her recent flare she felt her Asthma was well controlled. "   2) Sputum cultures negative to date.  3) Continue PPI. Discussed dietary changes, avoid meals close to bedtime and keeping HOB elevated at night.   4) She is up to date on her Prevnar 13, Pneumovax 23 and Influenza vaccinations.   5) Follow up in 1 month with Dr. Abdullahi as scheduled, sooner OV if needed.

## 2017-12-06 NOTE — PATIENT INSTRUCTIONS
Plan:    1) She states just in the last 24 hours is she feeling better. She has completed the Azithromycin. She took 60 mg of Prednisone this morning. We will work on a slow prolonged Prednisone wean with 40 mg x 3 days, 30 mg x 3 days, 20 mg x 3 days, then 10 mg x 3 days. Continue Mucinex OTC. Continue nebulizer treatments every 6-8 hours as needed. Continue Symbicort bid. We discussed stepping up from 80 mcg to 160 mcg. She would like to hold off as prior to her recent flare she felt her Asthma was well controlled.   2) Sputum cultures negative to date.  3) Continue PPI. Discussed dietary changes, avoid meals close to bedtime and keeping HOB elevated at night.   4) She is up to date on her Prevnar 13, Pneumovax 23 and Influenza vaccinations.   5) Follow up in 1 month with Dr. Abdullahi as scheduled, sooner OV if needed.

## 2017-12-07 LAB
BACTERIA BLD CULT: NORMAL
SIGNIFICANT IND 70042: NORMAL
SITE SITE: NORMAL
SOURCE SOURCE: NORMAL

## 2017-12-27 LAB
FUNGUS SPEC CULT: NORMAL
SIGNIFICANT IND 70042: NORMAL
SITE SITE: NORMAL
SOURCE SOURCE: NORMAL

## 2018-01-04 ENCOUNTER — OFFICE VISIT (OUTPATIENT)
Dept: PULMONOLOGY | Facility: HOSPICE | Age: 68
End: 2018-01-04
Payer: MEDICARE

## 2018-01-04 VITALS
WEIGHT: 178 LBS | SYSTOLIC BLOOD PRESSURE: 124 MMHG | RESPIRATION RATE: 16 BRPM | HEIGHT: 64 IN | TEMPERATURE: 99 F | BODY MASS INDEX: 30.39 KG/M2 | OXYGEN SATURATION: 98 % | DIASTOLIC BLOOD PRESSURE: 84 MMHG | HEART RATE: 88 BPM

## 2018-01-04 DIAGNOSIS — R06.09 DYSPNEA ON EXERTION: ICD-10-CM

## 2018-01-04 DIAGNOSIS — J45.20 MILD INTERMITTENT ASTHMA WITHOUT COMPLICATION: ICD-10-CM

## 2018-01-04 PROCEDURE — 99214 OFFICE O/P EST MOD 30 MIN: CPT | Performed by: INTERNAL MEDICINE

## 2018-01-04 RX ORDER — ACETAMINOPHEN 325 MG/1
650 TABLET ORAL
COMMUNITY
End: 2021-04-04

## 2018-01-04 RX ORDER — PREDNISONE 20 MG/1
TABLET ORAL
COMMUNITY
Start: 2017-11-14 | End: 2019-01-15

## 2018-01-04 RX ORDER — OMEPRAZOLE 40 MG/1
CAPSULE, DELAYED RELEASE ORAL
COMMUNITY
Start: 2017-12-04 | End: 2018-05-08

## 2018-01-04 RX ORDER — ALBUTEROL SULFATE 90 UG/1
2 AEROSOL, METERED RESPIRATORY (INHALATION) 4 TIMES DAILY
Qty: 25.5 G | Refills: 0 | Status: SHIPPED | OUTPATIENT
Start: 2018-01-04 | End: 2018-01-17 | Stop reason: SDUPTHER

## 2018-01-04 RX ORDER — FLUTICASONE PROPIONATE AND SALMETEROL XINAFOATE 230; 21 UG/1; UG/1
2 AEROSOL, METERED RESPIRATORY (INHALATION)
COMMUNITY
End: 2018-05-25

## 2018-01-04 RX ORDER — BUDESONIDE AND FORMOTEROL FUMARATE DIHYDRATE 80; 4.5 UG/1; UG/1
2 AEROSOL RESPIRATORY (INHALATION) 2 TIMES DAILY
Qty: 1 INHALER | Refills: 2 | Status: SHIPPED | OUTPATIENT
Start: 2018-01-04 | End: 2018-05-16

## 2018-01-04 RX ORDER — MONTELUKAST SODIUM 10 MG/1
10 TABLET ORAL DAILY
Qty: 90 TAB | Refills: 3 | Status: SHIPPED | OUTPATIENT
Start: 2018-01-04 | End: 2019-01-15

## 2018-01-04 RX ORDER — METHYLPREDNISOLONE 4 MG/1
TABLET ORAL
Qty: 21 TAB | Refills: 0 | Status: SHIPPED | OUTPATIENT
Start: 2018-01-04 | End: 2018-12-05

## 2018-01-04 RX ORDER — AZITHROMYCIN 250 MG/1
TABLET, FILM COATED ORAL
Qty: 6 TAB | Refills: 0 | Status: SHIPPED | OUTPATIENT
Start: 2018-01-04 | End: 2018-07-06 | Stop reason: SDUPTHER

## 2018-01-04 RX ORDER — FLAXSEED OIL
OIL (ML) MISCELLANEOUS
COMMUNITY
End: 2021-04-04

## 2018-01-04 NOTE — PROGRESS NOTES
Jacinda Patiño is a 67 y.o. female here for dyspnea with recent upper respiratory infection. Patient was referred by her primary care doctor.    History of Present Illness:      This lady comes in for evaluation of uncomfortable breathing. She was in the emergency room in December, at that time a CAT scan was normal, oxygen levels were normal and she was treated for an upper respiratory infection    She does have some dry mouth, stop the Mucinex, her  wonders if she might have Sjogren's. I don't see any signs or symptoms that would suggest that. Minor dry mouth is treated with Biotene.    Asthma is well controlled, she tells me her peak flows are 450. I refilled her Symbicort rescue inhaler and Singulair. She asks about allergy testing, I would defer that to an allergist. No clear allergy symptoms presently. Vaccinations are up-to-date.    She is traveling, wants a Medrol Dosepak and a Z-Lalo, and I sent those to her pharmacy.    Finally she does have orthopnea with excellent saturations, 98% today, clear lungs on exam normal CAT scan and no pulmonary emboli and no overt signs of GERD. She has no cardiac history, but family history is positive and I sent her to see a cardiologist for assessment of her orthopnea, possible cardiac echo if they feel that is appropriate. Recheck here in 6 months, sooner if needed    Constitutional ROS: No unexpected change in weight, No unexplained fevers  Eyes: No change in vision or blurring or double vision  Mouth/Throat ROS: No sore throat, No recent change in voice or hoarseness  Pulmonary ROS: See present history for pertinent positives  Cardiovascular ROS: No chest pain to suggest acute coronary syndrome  Gastrointestinal ROS: No abdominal pain to suggest peptic disease  Musculoskeletal/Extremities ROS: no acute artritis or unusual swelling  Hematologic/Lymphatic ROS: No easy bleeding or unusual lymph node swelling  Neurologic ROS: No new or unusual  weakness  Psychiatric ROS: No hallucinations  Allergic/Immunologic: No  urticaria or allergic rash      Current Outpatient Prescriptions   Medication Sig Dispense Refill   • MULTIPLE VITAMIN PO Take 1 tablet by mouth.     • budesonide-formoterol (SYMBICORT) 80-4.5 MCG/ACT Aerosol Inhale 2 Puffs by mouth 2 Times a Day. 1 Inhaler 2   • albuterol (PROAIR HFA) 108 (90 Base) MCG/ACT Aero Soln inhalation aerosol Inhale 2 Puffs by mouth 4 times a day. 25.5 g 0   • montelukast (SINGULAIR) 10 MG Tab Take 1 Tab by mouth every day. 90 Tab 3   • azithromycin (ZITHROMAX) 250 MG Tab Take 2 tablets on day 1, then take 1 tablet a day for 4 days. 6 Tab 0   • MethylPREDNISolone (MEDROL DOSEPAK) 4 MG Tablet Therapy Pack Take as directed. 21 Tab 0   • Probiotic Product (ALIGN PO) Take  by mouth.     • Nasal Wash (ALKALOL) Solution Spray  in nose.     • GuaiFENesin (MUCINEX PO) Take  by mouth.     • omeprazole (PRILOSEC) 40 MG delayed-release capsule TAKE 1 CAPSULE DAILY 90 Cap 3   • simvastatin (ZOCOR) 20 MG Tab TAKE 1 TABLET EVERY EVENING 90 Tab 3   • triazolam (HALCION) 0.125 MG tablet Take 1 Tab by mouth at bedtime as needed. 90 Tab 1   • Estradiol-Norethindrone Acet 0.5-0.1 MG Tab TAKE 1 TABLET DAILY 84 Tab 3   • albuterol (PROVENTIL) 2.5mg/3ml Nebu Soln solution for nebulization 3 mL by Nebulization route every four hours as needed for Shortness of Breath. 120 Bullet 5   • budesonide-formoterol (SYMBICORT) 80-4.5 MCG/ACT Aerosol Inhale 2 Puffs by mouth 2 Times a Day. Use spacer. Rinse mouth after each use. 1 Inhaler 5   • Naproxen (NAPROSYN PO) Take  by mouth.     • cyclosporin (RESTASIS) 0.05 % ophthalmic emulsion Place 1 Drop in both eyes 2 times a day.     • ibuprofen (MOTRIN) 600 MG TABS Take 600 mg by mouth every 6 hours as needed.     • VITAMIN D, CHOLECALCIFEROL, PO Take 5,000 Units by mouth.     • aspirin EC (ECOTRIN) 81 MG TBEC Take 81 mg by mouth every day.     • Cyanocobalamin (VITAMIN B 12 PO) Take  by mouth.     •  Flaxseed, Linseed, (FLAXSEED OIL PO) Take  by mouth.     • omeprazole (PRILOSEC) 40 MG delayed-release capsule      • acetaminophen (TYLENOL) 325 MG Tab Take 650 mg by mouth.     • Cholecalciferol 1000 units Chew Tab Take 5,000 Units by mouth.     • CycloSPORINE (RESTASIS OP) by Ophthalmic route.     • Flaxseed Oil (LINSEED OIL) Oil 1400mg daily     • fluticasone-salmeterol (ADVAIR HFA) 230-21 MCG/ACT inhaler 2 Puffs.     • NASAL WASH NA instill  into the nose. For dry mouth     • predniSONE (DELTASONE) 20 MG Tab      • PROBIOTIC PRODUCT PO Take  by mouth.     • predniSONE (DELTASONE) 10 MG Tab Take 60mg x 3days, then take 50 mg x 3 days, then take 40 mg x 3 days, 30 mg x 3 days, 20 mg x 3 days, 10 mg x 3 days then stop 63 Tab 0   • SYMBICORT 160-4.5 MCG/ACT Aerosol Take 2 Inhalation by mouth 2 Times a Day.     • polyethylene glycol 3350 (MIRALAX) Powder Take 17 g by mouth every day.     • Nutritional Supplements (HRT SUPPORT PO) Take  by mouth. Estadiol/noreth 1/0.5mg .1/2     • BIOTIN PO Take 6,000 mcg by mouth.     • Coenzyme Q10 (CO Q 10 PO) Take  by mouth. Liquid  100g       No current facility-administered medications for this visit.        Social History   Substance Use Topics   • Smoking status: Never Smoker   • Smokeless tobacco: Never Used   • Alcohol use No        Past Medical History:   Diagnosis Date   • HLD (hyperlipidemia) 7/7/2015   • Mild intermittent asthma without complication 7/19/2016       Past Surgical History:   Procedure Laterality Date   • KNEE ARTHROSCOPY Right 2015   • LUMPECTOMY Left 4/2012   • BASAL CELL EXCISION  2010    Forehead    • BLEPHAROPLASTY  2010   • SHOULDER ARTHROSCOPY  1992   • HERNIA REPAIR  1990    inguinal    • LAPAROSCOPY  1984    AGA   • PB REMV 2ND CATARACT,CORN-SCLER SECTN         Allergies: Patient has no known allergies.    Family History   Problem Relation Age of Onset   • Heart Attack Mother    • Heart Disease Mother    • Hyperlipidemia Mother    • Hypertension  "Mother        Physical Examination    Vitals:    01/04/18 1240   Height: 1.631 m (5' 4.2\")   Weight: 80.7 kg (178 lb)   Weight % change since last entry.: 0 %   BP: 124/84   Pulse: 88   BMI (Calculated): 30.36   Resp: 16   Temp: 37.2 °C (99 °F)       General Appearance: alert, no distress  Skin: Skin color, texture, turgor normal. No rashes or lesions.  Eyes: negative  Oropharynx: Lips, mucosa, and tongue normal. Teeth and gums normal. Oropharynx moist and without lesion  Lungs: positive findings:Remarkably clear without wheezes or rhonchi  Heart: negative. RRR without murmur, gallop, or rubs.  No ectopy.  Abdomen: Abdomen soft, non-tender. . No masses,  No organomegaly  Extremities:  No deformities, edema, or skin discoloration  Joints: No acute arthritis  Peripheral Pulses:perfused  Neurologic: intact grossly  No clubbing    II (soft palate, uvula, fauces visible)    Imaging: CAT scan in December was entirely clear, no embolus effusion or infiltrate    PFTS: None presently      Assessment and Plan  1. Mild intermittent asthma without complication    2. Dyspnea on exertion  - REFERRAL TO CARDIOLOGY    This lady comes in for evaluation of uncomfortable breathing. She was in the emergency room in December, at that time a CAT scan was normal, oxygen levels were normal and she was treated for an upper respiratory infection    She does have some dry mouth, stop the Mucinex, her  wonders if she might have Sjogren's. I don't see any signs or symptoms that would suggest that. Minor dry mouth is treated with Biotene.    Asthma is well controlled, she tells me her peak flows are 450. I refilled her Symbicort rescue inhaler and Singulair. She asks about allergy testing, I would defer that to an allergist. No clear allergy symptoms presently. Vaccinations are up-to-date.    She is traveling, wants a Medrol Dosepak and a Z-Lalo, and I sent those to her pharmacy.    Finally she does have orthopnea with excellent " saturations, 98% today, clear lungs on exam normal CAT scan and no pulmonary emboli and no overt signs of GERD. She has no cardiac history, but family history is positive and I sent her to see a cardiologist for assessment of her orthopnea, possible cardiac echo if they feel that is appropriate. Recheck here in 6 months, sooner if needed  Followup Return in about 6 months (around 7/4/2018) for follow up visit with Dr. Elizabeth Abdullahi.

## 2018-01-04 NOTE — PATIENT INSTRUCTIONS
This lady comes in for evaluation of uncomfortable breathing. She was in the emergency room in December, at that time a CAT scan was normal, oxygen levels were normal and she was treated for an upper respiratory infection    She does have some dry mouth, stop the Mucinex, her  wonders if she might have Sjogren's. I don't see any signs or symptoms that would suggest that. Minor dry mouth is treated with Biotene.    Asthma is well controlled, she tells me her peak flows are 450. I refilled her Symbicort rescue inhaler and Singulair. She asks about allergy testing, I would defer that to an allergist. No clear allergy symptoms presently. Vaccinations are up-to-date.    She is traveling, wants a Medrol Dosepak and a Z-Lalo, and I sent those to her pharmacy.    Finally she does have orthopnea with excellent saturations, 98% today, clear lungs on exam normal CAT scan and no pulmonary emboli and no overt signs of GERD. She has no cardiac history, but family history is positive and I sent her to see a cardiologist for assessment of her orthopnea, possible cardiac echo if they feel that is appropriate. Recheck here in 6 months, sooner if needed

## 2018-01-11 ENCOUNTER — TELEPHONE (OUTPATIENT)
Dept: PULMONOLOGY | Facility: HOSPICE | Age: 68
End: 2018-01-11

## 2018-01-11 NOTE — TELEPHONE ENCOUNTER
Patient calling she saw DR Abdullahi 1-4-18 and he RX'd to local pharmacy INSTEAD of mail order [facility]. I called [facility] and spoke with Husam and new RX's for proair, symbicort 80/4.5mcg and singulair 10mg were called for 90 day suppliy with 3 refills. These were already approved but sent to the local pharmacy instead of mail order. Correction has been made. Patient aware.

## 2018-01-17 ENCOUNTER — OFFICE VISIT (OUTPATIENT)
Dept: CARDIOLOGY | Facility: MEDICAL CENTER | Age: 68
End: 2018-01-17
Payer: MEDICARE

## 2018-01-17 VITALS
SYSTOLIC BLOOD PRESSURE: 120 MMHG | BODY MASS INDEX: 29.82 KG/M2 | WEIGHT: 179 LBS | OXYGEN SATURATION: 97 % | HEIGHT: 65 IN | DIASTOLIC BLOOD PRESSURE: 76 MMHG | HEART RATE: 76 BPM

## 2018-01-17 DIAGNOSIS — R06.09 DYSPNEA ON EXERTION: ICD-10-CM

## 2018-01-17 PROCEDURE — 99214 OFFICE O/P EST MOD 30 MIN: CPT | Performed by: INTERNAL MEDICINE

## 2018-01-17 ASSESSMENT — ENCOUNTER SYMPTOMS
NERVOUS/ANXIOUS: 0
DIZZINESS: 0
EYES NEGATIVE: 1
BRUISES/BLEEDS EASILY: 0
COUGH: 0
SPUTUM PRODUCTION: 0
INSOMNIA: 0
HEARTBURN: 0
WEIGHT LOSS: 0
LOSS OF CONSCIOUSNESS: 0
NAUSEA: 0
PALPITATIONS: 0
DEPRESSION: 0
PND: 0
ABDOMINAL PAIN: 0
WHEEZING: 0

## 2018-01-17 NOTE — PROGRESS NOTES
Subjective:   Jacinda Patiño is a 67 y.o. female who presents today    In follow-up after her emergency room visit for breathlessness    Brother-in-law is an ENT in [place]  She sees her asthma doctor in [facility] also here, thinks she had an asthma attack  Went to the ER because she was also afraid of blood clot and really couldn't breathe. It was terrible    Feels much better, had a terrible cold as well. Slowly getting better much worse when she lies on her side, cannot lie flat  Not associated chest pain swelling in the ankles or changes    ER workup was very normal including normal labs and BNP        Past Medical History:   Diagnosis Date   • HLD (hyperlipidemia) 7/7/2015   • Mild intermittent asthma without complication 7/19/2016     Past Surgical History:   Procedure Laterality Date   • KNEE ARTHROSCOPY Right 2015   • LUMPECTOMY Left 4/2012   • BASAL CELL EXCISION  2010    Forehead    • BLEPHAROPLASTY  2010   • SHOULDER ARTHROSCOPY  1992   • HERNIA REPAIR  1990    inguinal    • LAPAROSCOPY  1984    AGA   • PB REMV 2ND CATARACT,CORN-SCLER SECTN       Family History   Problem Relation Age of Onset   • Heart Attack Mother    • Heart Disease Mother    • Hyperlipidemia Mother    • Hypertension Mother      History   Smoking Status   • Never Smoker   Smokeless Tobacco   • Never Used     No Known Allergies  Outpatient Encounter Prescriptions as of 1/17/2018   Medication Sig Dispense Refill   • saliva substitute (BIOTENE) Solution Spray  in mouth/throat every day.     • Cholecalciferol 1000 units Chew Tab Take 5,000 Units by mouth.     • Flaxseed Oil (LINSEED OIL) Oil 1400mg daily     • fluticasone-salmeterol (ADVAIR HFA) 230-21 MCG/ACT inhaler 2 Puffs.     • MULTIPLE VITAMIN PO Take 1 tablet by mouth.     • albuterol (PROAIR HFA) 108 (90 Base) MCG/ACT Aero Soln inhalation aerosol Inhale 2 Puffs by mouth 4 times a day. 25.5 g 0   • montelukast (SINGULAIR) 10 MG Tab Take 1 Tab by mouth every day. 90 Tab 3    • Probiotic Product (ALIGN PO) Take  by mouth.     • Nasal Wash (ALKALOL) Solution Spray  in nose.     • omeprazole (PRILOSEC) 40 MG delayed-release capsule TAKE 1 CAPSULE DAILY 90 Cap 3   • simvastatin (ZOCOR) 20 MG Tab TAKE 1 TABLET EVERY EVENING 90 Tab 3   • Estradiol-Norethindrone Acet 0.5-0.1 MG Tab TAKE 1 TABLET DAILY 84 Tab 3   • albuterol (PROVENTIL) 2.5mg/3ml Nebu Soln solution for nebulization 3 mL by Nebulization route every four hours as needed for Shortness of Breath. 120 Bullet 5   • budesonide-formoterol (SYMBICORT) 80-4.5 MCG/ACT Aerosol Inhale 2 Puffs by mouth 2 Times a Day. Use spacer. Rinse mouth after each use. 1 Inhaler 5   • Naproxen (NAPROSYN PO) Take  by mouth.     • cyclosporin (RESTASIS) 0.05 % ophthalmic emulsion Place 1 Drop in both eyes 2 times a day.     • aspirin EC (ECOTRIN) 81 MG TBEC Take 81 mg by mouth every day.     • Cyanocobalamin (VITAMIN B 12 PO) Take  by mouth.     • omeprazole (PRILOSEC) 40 MG delayed-release capsule      • acetaminophen (TYLENOL) 325 MG Tab Take 650 mg by mouth.     • CycloSPORINE (RESTASIS OP) by Ophthalmic route.     • NASAL WASH NA instill  into the nose. For dry mouth     • predniSONE (DELTASONE) 20 MG Tab      • PROBIOTIC PRODUCT PO Take  by mouth.     • budesonide-formoterol (SYMBICORT) 80-4.5 MCG/ACT Aerosol Inhale 2 Puffs by mouth 2 Times a Day. 1 Inhaler 2   • azithromycin (ZITHROMAX) 250 MG Tab Take 2 tablets on day 1, then take 1 tablet a day for 4 days. 6 Tab 0   • MethylPREDNISolone (MEDROL DOSEPAK) 4 MG Tablet Therapy Pack Take as directed. 21 Tab 0   • GuaiFENesin (MUCINEX PO) Take  by mouth.     • triazolam (HALCION) 0.125 MG tablet Take 1 Tab by mouth at bedtime as needed. 90 Tab 1   • predniSONE (DELTASONE) 10 MG Tab Take 60mg x 3days, then take 50 mg x 3 days, then take 40 mg x 3 days, 30 mg x 3 days, 20 mg x 3 days, 10 mg x 3 days then stop 63 Tab 0   • SYMBICORT 160-4.5 MCG/ACT Aerosol Take 2 Inhalation by mouth 2 Times a Day.     •  "polyethylene glycol 3350 (MIRALAX) Powder Take 17 g by mouth every day.     • ibuprofen (MOTRIN) 600 MG TABS Take 600 mg by mouth every 6 hours as needed.     • Nutritional Supplements (HRT SUPPORT PO) Take  by mouth. Estadiol/noreth 1/0.5mg .1/2     • VITAMIN D, CHOLECALCIFEROL, PO Take 5,000 Units by mouth.     • BIOTIN PO Take 6,000 mcg by mouth.     • Coenzyme Q10 (CO Q 10 PO) Take  by mouth. Liquid  100g     • Flaxseed, Linseed, (FLAXSEED OIL PO) Take  by mouth.       No facility-administered encounter medications on file as of 1/17/2018.      Review of Systems   Constitutional: Negative for malaise/fatigue and weight loss.   Eyes: Negative.    Respiratory: Negative for cough, sputum production and wheezing.    Cardiovascular: Negative for chest pain, palpitations, leg swelling and PND.   Gastrointestinal: Negative for abdominal pain, heartburn and nausea.   Neurological: Negative for dizziness and loss of consciousness.   Endo/Heme/Allergies: Does not bruise/bleed easily.   Psychiatric/Behavioral: Negative for depression. The patient is not nervous/anxious and does not have insomnia.    All other systems reviewed and are negative.       Objective:   /76   Pulse 76   Ht 1.651 m (5' 5\")   Wt 81.2 kg (179 lb)   SpO2 97%   BMI 29.79 kg/m²     Physical Exam   Constitutional: She is oriented to person, place, and time. She appears well-developed and well-nourished.   HENT:   Head: Normocephalic and atraumatic.   Mouth/Throat: Mucous membranes are normal.   Eyes: EOM are normal. Pupils are equal, round, and reactive to light. No scleral icterus.   Neck: No JVD present. No tracheal deviation present. No thyroid mass and no thyromegaly present.   Cardiovascular: Normal rate, regular rhythm and intact distal pulses.    No murmur heard.  Pulmonary/Chest: Effort normal and breath sounds normal. No respiratory distress. She exhibits no tenderness.   Abdominal: Bowel sounds are normal. She exhibits no distension. "   Musculoskeletal: Normal range of motion. She exhibits no edema or tenderness.   Neurological: She is alert and oriented to person, place, and time. She has normal strength. She displays no tremor. She exhibits normal muscle tone. Coordination normal.   Skin: Skin is warm and dry. No rash noted.   Psychiatric: She has a normal mood and affect. Her behavior is normal.   Vitals reviewed.      Assessment:     1. Dyspnea on exertion  Echocardiogram Comp w/o Cont       Medical Decision Making:  Today's Assessment / Status / Plan:     Noted her CAT scan as well as her BMP and blood work all looks reassuring as does her chest x-ray  Pulmonology visit last week was normal    Breathlessness  Reassurance  We will check her echo particularly look at the right heart pulmonary hypertension  Normal exam today    She'll follow-up as needed or in a month. Changes it worse out her very low threshold to nuclear study she agrees

## 2018-01-23 LAB
MYCOBACTERIUM SPEC CULT: NORMAL
RHODAMINE-AURAMINE STN SPEC: NORMAL
SIGNIFICANT IND 70042: NORMAL
SITE SITE: NORMAL
SOURCE SOURCE: NORMAL

## 2018-01-29 ENCOUNTER — HOSPITAL ENCOUNTER (OUTPATIENT)
Dept: CARDIOLOGY | Facility: MEDICAL CENTER | Age: 68
End: 2018-01-29
Attending: INTERNAL MEDICINE
Payer: MEDICARE

## 2018-01-29 ENCOUNTER — TELEPHONE (OUTPATIENT)
Dept: CARDIOLOGY | Facility: MEDICAL CENTER | Age: 68
End: 2018-01-29

## 2018-01-29 DIAGNOSIS — R06.09 DYSPNEA ON EXERTION: ICD-10-CM

## 2018-01-29 LAB
LV EJECT FRACT  99904: 65
LV EJECT FRACT MOD 2C 99903: 69.99
LV EJECT FRACT MOD 4C 99902: 70.15
LV EJECT FRACT MOD BP 99901: 70.54

## 2018-01-29 PROCEDURE — 93306 TTE W/DOPPLER COMPLETE: CPT

## 2018-01-29 PROCEDURE — 93306 TTE W/DOPPLER COMPLETE: CPT | Mod: 26 | Performed by: INTERNAL MEDICINE

## 2018-03-23 ENCOUNTER — PATIENT OUTREACH (OUTPATIENT)
Dept: HEALTH INFORMATION MANAGEMENT | Facility: OTHER | Age: 68
End: 2018-03-23

## 2018-04-10 NOTE — PROGRESS NOTES
1. Attempt #: 1    2. [facility] Verified: yes    3. Verify PCP: yes    4. Care Team Updated:       •   DME Company (gait device, O2, CPAP, etc.): YES       •   Other Specialists (eye doctor, derm, GYN, cardiology, endo, etc): YES    5.  Reviewed/Updated the following with patient:       •   Communication Preference Obtained? YES       •   Preferred Pharmacy? YES       •   Preferred Lab? YES       •   Family History (document living status of immediate family members and if + hx of cancer, diabetes, hypertension, hyperlipidemia, heart attack, stroke) YES. Was Abstract Encounter opened and chart updated? YES    6. Mixpanel Activation: already active    7. Mixpanel Carla: no    8. Annual Wellness Visit Scheduling  Scheduling Status:Scheduled       9. Care Gap Scheduling (Attempt to Schedule EACH Overdue Care Gap!)     Health Maintenance Due   Topic Date Due   • Annual Wellness Visit  06/08/2017        Scheduled patient for Annual Wellness Visit    10. Patient was advised: “This is a free wellness visit. The provider will screen for medical conditions to help you stay healthy. If you have other concerns to address you may be asked to discuss these at a separate visit or there may be an additional fee.”     11. Patient was informed to arrive 15 min prior to their scheduled appointment and bring in their medication bottles.

## 2018-05-08 ENCOUNTER — OFFICE VISIT (OUTPATIENT)
Dept: MEDICAL GROUP | Facility: PHYSICIAN GROUP | Age: 68
End: 2018-05-08
Payer: MEDICARE

## 2018-05-08 ENCOUNTER — HOSPITAL ENCOUNTER (OUTPATIENT)
Dept: LAB | Facility: MEDICAL CENTER | Age: 68
End: 2018-05-08
Attending: FAMILY MEDICINE
Payer: MEDICARE

## 2018-05-08 VITALS
SYSTOLIC BLOOD PRESSURE: 130 MMHG | TEMPERATURE: 97.2 F | HEIGHT: 65 IN | DIASTOLIC BLOOD PRESSURE: 84 MMHG | HEART RATE: 84 BPM | OXYGEN SATURATION: 99 % | RESPIRATION RATE: 14 BRPM

## 2018-05-08 DIAGNOSIS — R94.6 THYROID FUNCTION STUDY ABNORMALITY: ICD-10-CM

## 2018-05-08 DIAGNOSIS — K21.9 GASTROESOPHAGEAL REFLUX DISEASE, ESOPHAGITIS PRESENCE NOT SPECIFIED: ICD-10-CM

## 2018-05-08 DIAGNOSIS — E53.8 B12 DEFICIENCY: ICD-10-CM

## 2018-05-08 DIAGNOSIS — D64.9 ANEMIA, UNSPECIFIED TYPE: ICD-10-CM

## 2018-05-08 DIAGNOSIS — Z91.09 ENVIRONMENTAL ALLERGIES: ICD-10-CM

## 2018-05-08 LAB
BASOPHILS # BLD AUTO: 0.7 % (ref 0–1.8)
BASOPHILS # BLD: 0.1 K/UL (ref 0–0.12)
EOSINOPHIL # BLD AUTO: 0.08 K/UL (ref 0–0.51)
EOSINOPHIL NFR BLD: 0.5 % (ref 0–6.9)
ERYTHROCYTE [DISTWIDTH] IN BLOOD BY AUTOMATED COUNT: 41 FL (ref 35.9–50)
FERRITIN SERPL-MCNC: 27.6 NG/ML (ref 10–291)
FOLATE SERPL-MCNC: 8 NG/ML
HCT VFR BLD AUTO: 35.7 % (ref 37–47)
HGB BLD-MCNC: 10.9 G/DL (ref 12–16)
IMM GRANULOCYTES # BLD AUTO: 0.08 K/UL (ref 0–0.11)
IMM GRANULOCYTES NFR BLD AUTO: 0.5 % (ref 0–0.9)
LYMPHOCYTES # BLD AUTO: 1.3 K/UL (ref 1–4.8)
LYMPHOCYTES NFR BLD: 8.6 % (ref 22–41)
MCH RBC QN AUTO: 20.8 PG (ref 27–33)
MCHC RBC AUTO-ENTMCNC: 30.5 G/DL (ref 33.6–35)
MCV RBC AUTO: 68.1 FL (ref 81.4–97.8)
MONOCYTES # BLD AUTO: 0.56 K/UL (ref 0–0.85)
MONOCYTES NFR BLD AUTO: 3.7 % (ref 0–13.4)
NEUTROPHILS # BLD AUTO: 13.01 K/UL (ref 2–7.15)
NEUTROPHILS NFR BLD: 86 % (ref 44–72)
NRBC # BLD AUTO: 0 K/UL
NRBC BLD-RTO: 0 /100 WBC
PLATELET # BLD AUTO: 362 K/UL (ref 164–446)
PMV BLD AUTO: 11.1 FL (ref 9–12.9)
RBC # BLD AUTO: 5.24 M/UL (ref 4.2–5.4)
T3 SERPL-MCNC: 54.2 NG/DL (ref 60–181)
T4 FREE SERPL-MCNC: 0.8 NG/DL (ref 0.53–1.43)
TSH SERPL DL<=0.005 MIU/L-ACNC: 2.07 UIU/ML (ref 0.38–5.33)
VIT B12 SERPL-MCNC: 288 PG/ML (ref 211–911)
WBC # BLD AUTO: 15.1 K/UL (ref 4.8–10.8)

## 2018-05-08 PROCEDURE — 82728 ASSAY OF FERRITIN: CPT

## 2018-05-08 PROCEDURE — 84439 ASSAY OF FREE THYROXINE: CPT

## 2018-05-08 PROCEDURE — 84443 ASSAY THYROID STIM HORMONE: CPT

## 2018-05-08 PROCEDURE — 83540 ASSAY OF IRON: CPT

## 2018-05-08 PROCEDURE — 85025 COMPLETE CBC W/AUTO DIFF WBC: CPT

## 2018-05-08 PROCEDURE — 84480 ASSAY TRIIODOTHYRONINE (T3): CPT

## 2018-05-08 PROCEDURE — 83090 ASSAY OF HOMOCYSTEINE: CPT | Mod: GA

## 2018-05-08 PROCEDURE — 82746 ASSAY OF FOLIC ACID SERUM: CPT

## 2018-05-08 PROCEDURE — 82607 VITAMIN B-12: CPT

## 2018-05-08 PROCEDURE — 99214 OFFICE O/P EST MOD 30 MIN: CPT | Performed by: FAMILY MEDICINE

## 2018-05-08 PROCEDURE — 36415 COLL VENOUS BLD VENIPUNCTURE: CPT

## 2018-05-08 PROCEDURE — 83550 IRON BINDING TEST: CPT

## 2018-05-08 RX ORDER — PANTOPRAZOLE SODIUM 40 MG/1
40 TABLET, DELAYED RELEASE ORAL DAILY
COMMUNITY
End: 2019-01-15

## 2018-05-08 NOTE — PROGRESS NOTES
Chief Complaint   Patient presents with   • Orders Needed     upper GI, labs   • Immunizations     shingles       HISTORY OF PRESENT ILLNESS: Patient is a 68 y.o. female established patient here today for the following concerns:    1. Gastroesophageal reflux disease, esophagitis presence not specified  Here today for follow up.  Has been seen by both her pulmonologists and no clear etiology for the SOB that is worse at night.  Reports still has nocturnal symptoms and burning in the chest.  Her pulmonologist from [place] recommended that she have upper GI evaluation.  She is now taking pantoprazole.  Cardiac work up was unrevealing.     2. Environmental allergies  Has long standing hx of allergy to cat/dog dander and down.      3. B12 deficiency  4. Anemia, unspecified type  Has hx of B12 deficiency.  Has noted changes in skin and hair.  Previously on injectable B12.     5. Thyroid function study abnormality  Requests recheck for the thyroid.  Reports that she has noted hair and skin changes.       Past Medical, Social, and Family history reviewed and updated in EPIC    Allergies:Patient has no known allergies.    Current Outpatient Prescriptions   Medication Sig Dispense Refill   • pantoprazole (PROTONIX) 40 MG Tablet Delayed Response Take 40 mg by mouth every day.     • saliva substitute (BIOTENE) Solution Spray  in mouth/throat every day.     • PROAIR  (90 Base) MCG/ACT Aero Soln inhalation aerosol USE 2 INHALATIONS EVERY 6 HOURS AS NEEDED FOR SHORTNESS OF BREATH 25.5 g 1   • acetaminophen (TYLENOL) 325 MG Tab Take 650 mg by mouth.     • Cholecalciferol 1000 units Chew Tab Take 5,000 Units by mouth.     • CycloSPORINE (RESTASIS OP) by Ophthalmic route.     • Flaxseed Oil (LINSEED OIL) Oil 1400mg daily     • fluticasone-salmeterol (ADVAIR HFA) 230-21 MCG/ACT inhaler 2 Puffs.     • MULTIPLE VITAMIN PO Take 1 tablet by mouth.     • NASAL WASH NA instill  into the nose. For dry mouth     • predniSONE  (DELTASONE) 20 MG Tab      • PROBIOTIC PRODUCT PO Take  by mouth.     • budesonide-formoterol (SYMBICORT) 80-4.5 MCG/ACT Aerosol Inhale 2 Puffs by mouth 2 Times a Day. 1 Inhaler 2   • montelukast (SINGULAIR) 10 MG Tab Take 1 Tab by mouth every day. 90 Tab 3   • azithromycin (ZITHROMAX) 250 MG Tab Take 2 tablets on day 1, then take 1 tablet a day for 4 days. 6 Tab 0   • MethylPREDNISolone (MEDROL DOSEPAK) 4 MG Tablet Therapy Pack Take as directed. 21 Tab 0   • Probiotic Product (ALIGN PO) Take  by mouth.     • Nasal Wash (ALKALOL) Solution Spray  in nose.     • GuaiFENesin (MUCINEX PO) Take  by mouth.     • simvastatin (ZOCOR) 20 MG Tab TAKE 1 TABLET EVERY EVENING 90 Tab 3   • triazolam (HALCION) 0.125 MG tablet Take 1 Tab by mouth at bedtime as needed. 90 Tab 1   • Estradiol-Norethindrone Acet 0.5-0.1 MG Tab TAKE 1 TABLET DAILY 84 Tab 3   • predniSONE (DELTASONE) 10 MG Tab Take 60mg x 3days, then take 50 mg x 3 days, then take 40 mg x 3 days, 30 mg x 3 days, 20 mg x 3 days, 10 mg x 3 days then stop 63 Tab 0   • albuterol (PROVENTIL) 2.5mg/3ml Nebu Soln solution for nebulization 3 mL by Nebulization route every four hours as needed for Shortness of Breath. 120 Bullet 5   • budesonide-formoterol (SYMBICORT) 80-4.5 MCG/ACT Aerosol Inhale 2 Puffs by mouth 2 Times a Day. Use spacer. Rinse mouth after each use. 1 Inhaler 5   • Naproxen (NAPROSYN PO) Take  by mouth.     • SYMBICORT 160-4.5 MCG/ACT Aerosol Take 2 Inhalation by mouth 2 Times a Day.     • cyclosporin (RESTASIS) 0.05 % ophthalmic emulsion Place 1 Drop in both eyes 2 times a day.     • polyethylene glycol 3350 (MIRALAX) Powder Take 17 g by mouth every day.     • ibuprofen (MOTRIN) 600 MG TABS Take 600 mg by mouth every 6 hours as needed.     • VITAMIN D, CHOLECALCIFEROL, PO Take 5,000 Units by mouth.     • aspirin EC (ECOTRIN) 81 MG TBEC Take 81 mg by mouth every day.     • BIOTIN PO Take 6,000 mcg by mouth.     • Cyanocobalamin (VITAMIN B 12 PO) Take  by  "mouth.     • Coenzyme Q10 (CO Q 10 PO) Take  by mouth. Liquid  100g     • Flaxseed, Linseed, (FLAXSEED OIL PO) Take  by mouth.       No current facility-administered medications for this visit.          ROS:  Review of Systems   Constitutional: Negative for fever, chills, weight loss and malaise/fatigue.   HENT: Negative for ear pain, nosebleeds, congestion, sore throat and neck pain.    Eyes: Negative for blurred vision.   Respiratory: Negative for cough, sputum production, shortness of breath and wheezing.    Cardiovascular: Negative for chest pain, palpitations,  and leg swelling.   Gastrointestinal: Negative for heartburn, nausea, vomiting, diarrhea and abdominal pain.   Genitourinary: Negative for dysuria, urgency and frequency.   Musculoskeletal: Negative for myalgias, back pain and joint pain.   Skin: Negative for rash and itching.   Neurological: Negative for dizziness, tingling, tremors, sensory change, focal weakness and headaches.   Endo/Heme/Allergies: Does not bruise/bleed easily.   Psychiatric/Behavioral: Negative for depression, anxiety, suicidal ideas, insomnia and memory loss.      Exam:  Blood pressure 130/84, pulse 84, temperature 36.2 °C (97.2 °F), resp. rate 14, height 1.651 m (5' 5\"), SpO2 99 %.    General:  Well nourished, well developed in NAD  Head is grossly normal.  Neck: Supple without JVD   Pulmonary:  Normal effort.   Cardiovascular: Regular rate  Extremities: no clubbing, cyanosis, or edema.  Psych: affect appropriate      Please note that this dictation was created using voice recognition software. I have made every reasonable attempt to correct obvious errors, but I expect that there are errors of grammar and possibly content that I did not discover before finalizing the note.    Assessment/Plan:  1. Gastroesophageal reflux disease, esophagitis presence not specified  check  - DX-ESOPHAGUS - BARIUM SWALLOW; Future  Continue Protonix     2. Environmental allergies  Requests skin " testing.   - REFERRAL TO ALLERGY    3. B12 deficiency  Check levels.   - VITAMIN B12; Future  - FOLATE; Future  - HOMOCYSTEINE; Future    4. Anemia, unspecified type  Check   - VITAMIN B12; Future  - FOLATE; Future  - HOMOCYSTEINE; Future  - CBC WITH DIFFERENTIAL; Future  - IRON/TOTAL IRON BIND; Future  - FERRITIN; Future    5. Thyroid function study abnormality  Check  - TSH; Future  - FREE THYROXINE; Future  - TRIIDOTHYRONINE; Future    Follow up 3 months

## 2018-05-09 ENCOUNTER — TELEPHONE (OUTPATIENT)
Dept: MEDICAL GROUP | Facility: PHYSICIAN GROUP | Age: 68
End: 2018-05-09

## 2018-05-09 LAB
HCYS SERPL-SCNC: 13.09 UMOL/L
IRON SATN MFR SERPL: 25 % (ref 15–55)
IRON SERPL-MCNC: 89 UG/DL (ref 40–170)
TIBC SERPL-MCNC: 353 UG/DL (ref 250–450)

## 2018-05-09 NOTE — TELEPHONE ENCOUNTER
ANNUAL WELLNESS VISIT PRE-VISIT PLANNING WITH OUTREACH    1.  Immunizations were updated in Epic using WebIZ?:Yes       •  WebIZ Recommendations: Patient is up to date on all vaccines       •  Is patient due for Tdap? NO       •  Is patient due for Shingles?NO    2.  MDX printed for Provider? NO/MCR

## 2018-05-14 ENCOUNTER — HOSPITAL ENCOUNTER (OUTPATIENT)
Dept: RADIOLOGY | Facility: MEDICAL CENTER | Age: 68
End: 2018-05-14
Attending: FAMILY MEDICINE
Payer: MEDICARE

## 2018-05-14 DIAGNOSIS — K21.9 GASTROESOPHAGEAL REFLUX DISEASE, ESOPHAGITIS PRESENCE NOT SPECIFIED: ICD-10-CM

## 2018-05-14 PROCEDURE — 74220 X-RAY XM ESOPHAGUS 1CNTRST: CPT

## 2018-05-14 PROCEDURE — 700112 HCHG RX REV CODE 229: Performed by: FAMILY MEDICINE

## 2018-05-14 PROCEDURE — A9270 NON-COVERED ITEM OR SERVICE: HCPCS | Performed by: FAMILY MEDICINE

## 2018-05-14 RX ADMIN — ANTACID/ANTIFLATULENT 1 PACKET: 380; 550; 10; 10 GRANULE, EFFERVESCENT ORAL at 13:30

## 2018-05-16 ENCOUNTER — OFFICE VISIT (OUTPATIENT)
Dept: MEDICAL GROUP | Facility: PHYSICIAN GROUP | Age: 68
End: 2018-05-16
Payer: MEDICARE

## 2018-05-16 VITALS
DIASTOLIC BLOOD PRESSURE: 64 MMHG | HEART RATE: 64 BPM | OXYGEN SATURATION: 99 % | HEIGHT: 64 IN | SYSTOLIC BLOOD PRESSURE: 106 MMHG | RESPIRATION RATE: 20 BRPM | WEIGHT: 165 LBS | TEMPERATURE: 99 F | BODY MASS INDEX: 28.17 KG/M2

## 2018-05-16 DIAGNOSIS — K21.9 GASTROESOPHAGEAL REFLUX DISEASE, ESOPHAGITIS PRESENCE NOT SPECIFIED: ICD-10-CM

## 2018-05-16 DIAGNOSIS — D56.3 THALASSEMIA MINOR: ICD-10-CM

## 2018-05-16 DIAGNOSIS — J45.20 MILD INTERMITTENT ASTHMA WITHOUT COMPLICATION: ICD-10-CM

## 2018-05-16 DIAGNOSIS — Z85.828 PERSONAL HISTORY OF OTHER MALIGNANT NEOPLASM OF SKIN: ICD-10-CM

## 2018-05-16 DIAGNOSIS — Z79.890 HORMONE REPLACEMENT THERAPY (POSTMENOPAUSAL): ICD-10-CM

## 2018-05-16 DIAGNOSIS — E78.5 HYPERLIPIDEMIA, UNSPECIFIED HYPERLIPIDEMIA TYPE: ICD-10-CM

## 2018-05-16 DIAGNOSIS — K44.9 HIATAL HERNIA: ICD-10-CM

## 2018-05-16 DIAGNOSIS — N60.99 BREAST DUCTAL HYPERPLASIA, ATYPICAL: ICD-10-CM

## 2018-05-16 DIAGNOSIS — R94.6 ABNORMAL THYROID FUNCTION TEST: ICD-10-CM

## 2018-05-16 PROBLEM — R06.09 DYSPNEA ON EXERTION: Status: RESOLVED | Noted: 2018-01-04 | Resolved: 2018-05-16

## 2018-05-16 PROCEDURE — G0439 PPPS, SUBSEQ VISIT: HCPCS | Performed by: FAMILY MEDICINE

## 2018-05-16 ASSESSMENT — ACTIVITIES OF DAILY LIVING (ADL): BATHING_REQUIRES_ASSISTANCE: 0

## 2018-05-16 ASSESSMENT — ENCOUNTER SYMPTOMS: GENERAL WELL-BEING: GOOD

## 2018-05-16 ASSESSMENT — PATIENT HEALTH QUESTIONNAIRE - PHQ9: CLINICAL INTERPRETATION OF PHQ2 SCORE: 0

## 2018-05-16 NOTE — PROGRESS NOTES
Chief Complaint   Patient presents with   • Annual Wellness Visit         HPI:  Jacinda is a 68 y.o. here for Medicare Annual Wellness Visit        Patient Active Problem List    Diagnosis Date Noted   • Dyspnea on exertion 01/04/2018   • Mild intermittent asthma without complication 07/19/2016   • GERD (gastroesophageal reflux disease) 05/05/2016   • HLD (hyperlipidemia) 07/07/2015   • Hormone replacement therapy (postmenopausal) 07/07/2015   • Thalassemia minor 07/07/2015   • H/O breast surgery 03/12/2015       Current Outpatient Prescriptions   Medication Sig Dispense Refill   • pantoprazole (PROTONIX) 40 MG Tablet Delayed Response Take 40 mg by mouth every day.     • saliva substitute (BIOTENE) Solution Spray  in mouth/throat every day.     • PROAIR  (90 Base) MCG/ACT Aero Soln inhalation aerosol USE 2 INHALATIONS EVERY 6 HOURS AS NEEDED FOR SHORTNESS OF BREATH 25.5 g 1   • Cholecalciferol 1000 units Chew Tab Take 5,000 Units by mouth.     • CycloSPORINE (RESTASIS OP) by Ophthalmic route.     • Flaxseed Oil (LINSEED OIL) Oil 1400mg daily     • MULTIPLE VITAMIN PO Take 1 tablet by mouth.     • montelukast (SINGULAIR) 10 MG Tab Take 1 Tab by mouth every day. 90 Tab 3   • Probiotic Product (ALIGN PO) Take  by mouth.     • Nasal Wash (ALKALOL) Solution Spray  in nose.     • simvastatin (ZOCOR) 20 MG Tab TAKE 1 TABLET EVERY EVENING 90 Tab 3   • Estradiol-Norethindrone Acet 0.5-0.1 MG Tab TAKE 1 TABLET DAILY 84 Tab 3   • budesonide-formoterol (SYMBICORT) 80-4.5 MCG/ACT Aerosol Inhale 2 Puffs by mouth 2 Times a Day. Use spacer. Rinse mouth after each use. 1 Inhaler 5   • Naproxen (NAPROSYN PO) Take  by mouth.     • cyclosporin (RESTASIS) 0.05 % ophthalmic emulsion Place 1 Drop in both eyes 2 times a day.     • polyethylene glycol 3350 (MIRALAX) Powder Take 17 g by mouth every day.     • aspirin EC (ECOTRIN) 81 MG TBEC Take 81 mg by mouth every day.     • acetaminophen (TYLENOL) 325 MG Tab Take 650 mg by mouth.      • fluticasone-salmeterol (ADVAIR HFA) 230-21 MCG/ACT inhaler 2 Puffs.     • NASAL WASH NA instill  into the nose. For dry mouth     • predniSONE (DELTASONE) 20 MG Tab      • PROBIOTIC PRODUCT PO Take  by mouth.     • budesonide-formoterol (SYMBICORT) 80-4.5 MCG/ACT Aerosol Inhale 2 Puffs by mouth 2 Times a Day. 1 Inhaler 2   • azithromycin (ZITHROMAX) 250 MG Tab Take 2 tablets on day 1, then take 1 tablet a day for 4 days. 6 Tab 0   • MethylPREDNISolone (MEDROL DOSEPAK) 4 MG Tablet Therapy Pack Take as directed. 21 Tab 0   • GuaiFENesin (MUCINEX PO) Take  by mouth.     • triazolam (HALCION) 0.125 MG tablet Take 1 Tab by mouth at bedtime as needed. 90 Tab 1   • predniSONE (DELTASONE) 10 MG Tab Take 60mg x 3days, then take 50 mg x 3 days, then take 40 mg x 3 days, 30 mg x 3 days, 20 mg x 3 days, 10 mg x 3 days then stop 63 Tab 0   • albuterol (PROVENTIL) 2.5mg/3ml Nebu Soln solution for nebulization 3 mL by Nebulization route every four hours as needed for Shortness of Breath. 120 Bullet 5   • SYMBICORT 160-4.5 MCG/ACT Aerosol Take 2 Inhalation by mouth 2 Times a Day.     • ibuprofen (MOTRIN) 600 MG TABS Take 600 mg by mouth every 6 hours as needed.     • VITAMIN D, CHOLECALCIFEROL, PO Take 5,000 Units by mouth.     • BIOTIN PO Take 6,000 mcg by mouth.     • Cyanocobalamin (VITAMIN B 12 PO) Take  by mouth.     • Coenzyme Q10 (CO Q 10 PO) Take  by mouth. Liquid  100g     • Flaxseed, Linseed, (FLAXSEED OIL PO) Take  by mouth.       No current facility-administered medications for this visit.         Patient is taking medications as noted in medication list.  Current supplements as per medication list.     Allergies: Patient has no known allergies.    Current social contact/activities: Friends     Is patient current with immunizations? No, due for ZOSTAVAX (Shingles). Patient is interested in receiving NONE today.    She  reports that she has never smoked. She has never used smokeless tobacco. She reports that she does  not drink alcohol or use drugs.  Counseling given: Not Answered        DPA/Advanced directive: Patient has Advanced Directive, but it is not on file. Instructed to bring in a copy to scan into their chart.    ROS:    Gait: Uses no assistive device   Ostomy: no   Other tubes: no   Amputations: no   Chronic oxygen use no   Last eye exam 6 months ago   Wears hearing aids: no   : Reports urinary leakage during the last 6 months that has not interfered at all with their daily activities or sleep.          Depression Screening    Little interest or pleasure in doing things?  0 - not at all  Feeling down, depressed, or hopeless? 0 - not at all  Patient Health Questionnaire Score: 0    If depressive symptoms identified deferred to follow up visit unless specifically addressed in assessment and plan.    Interpretation of PHQ-9 Total Score   Score Severity   1-4 No Depression   5-9 Mild Depression   10-14 Moderate Depression   15-19 Moderately Severe Depression   20-27 Severe Depression    Screening for Cognitive Impairment    Three Minute Recall (leader, season, table)  3/3    Dennis clock face with all 12 numbers and set the hands to show 10 past 11.  Yes 5/5  If cognitive concerns identified, deferred for follow up unless specifically addressed in assessment and plan.    Fall Risk Assessment    Has the patient had two or more falls in the last year or any fall with injury in the last year?  No  If fall risk identified, deferred for follow up unless specifically addressed in assessment and plan.    Safety Assessment    Throw rugs on floor.  Yes  Handrails on all stairs.  Yes  Good lighting in all hallways.  Yes  Difficulty hearing.  No  Patient counseled about all safety risks that were identified.    Functional Assessment ADLs    Are there any barriers preventing you from cooking for yourself or meeting nutritional needs?  No.    Are there any barriers preventing you from driving safely or obtaining transportation?  No.     Are there any barriers preventing you from using a telephone or calling for help?  No.    Are there any barriers preventing you from shopping?  No.    Are there any barriers preventing you from taking care of your own finances?  No.    Are there any barriers preventing you from managing your medications?  No.    Are there any barriers preventing you from showering, bathing or dressing yourself?  No.    Are you currently engaging in any exercise or physical activity?  Yes.  Walks 3-5 mi daily  What is your perception of your health?  Good.    Health Maintenance Summary                Annual Wellness Visit Overdue 6/8/2017      Done 6/7/2016      Patient has more history with this topic...    MAMMOGRAM Next Due 10/18/2018      Done 10/18/2017 MA-MAMMO SCREENING BILAT W/TOMOSYNTHESIS W/CAD     Patient has more history with this topic...    IMM DTaP/Tdap/Td Vaccine Next Due 2/11/2019      Done 2/11/2009 Imm Admin: Tdap Vaccine    PAP SMEAR Next Due 6/7/2019      Done 6/7/2016      Patient has more history with this topic...    COLONOSCOPY Next Due 7/20/2020      Done 7/20/2010 est date     Patient has more history with this topic...    BONE DENSITY Next Due 8/3/2020      Done 8/3/2015 DS-BONE DENSITY STUDY (DEXA)     Patient has more history with this topic...          Patient Care Team:  Arcelia Walton M.D. as PCP - General (Family Medicine)  Kallie Rodriguez M.D. (Cardiology)  [facility]  Elizabeth Abdullahi M.D. as Consulting Physician (Pulmonary Medicine)  DAGOBERTO as Consulting Physician (Orthopaedics)    Social History   Substance Use Topics   • Smoking status: Never Smoker   • Smokeless tobacco: Never Used   • Alcohol use No     Family History   Problem Relation Age of Onset   • Heart Attack Mother    • Heart Disease Mother    • Hyperlipidemia Mother    • Hypertension Mother    • Stroke Mother    • Heart Attack Maternal Aunt      She  has a past medical history of HLD (hyperlipidemia) (7/7/2015)  "and Mild intermittent asthma without complication (7/19/2016).   Past Surgical History:   Procedure Laterality Date   • KNEE ARTHROSCOPY Right 2015   • LUMPECTOMY Left 4/2012   • BASAL CELL EXCISION  2010    Forehead    • BLEPHAROPLASTY  2010   • SHOULDER ARTHROSCOPY  1992   • HERNIA REPAIR  1990    inguinal    • LAPAROSCOPY  1984    AGA   • PB REMV 2ND CATARACT,CORN-SCLER SECTN             Exam:     Blood pressure 106/64, pulse 64, temperature 37.2 °C (99 °F), resp. rate 20, height 1.626 m (5' 4\"), weight 74.8 kg (165 lb), SpO2 99 %. Body mass index is 28.32 kg/m².    Hearing excellent.    Dentition good  Alert, oriented in no acute distress.  Eye contact is good, speech goal directed, affect calm      Assessment and Plan. The following treatment and monitoring plan is recommended:    1. Hiatal hernia  Continue lifestyle changes.     2. Gastroesophageal reflux disease, esophagitis presence not specified  Continue PPI, consider GI consultation if not improving.      3. Breast ductal hyperplasia, atypical  Noted. Continue surveillance annually.  Consider sonocine/tomosynthesis    4. Hyperlipidemia, unspecified hyperlipidemia type  Continue simvastatin    5. Hormone replacement therapy (postmenopausal)  Stable continue current dosing, will need to monitor closely with the ductal hyperplasia.     6. Mild intermittent asthma without complication  Continue inhaled medications.  Will continue follow up with pulmonology in [place]    7. Personal history of other malignant neoplasm of skin  Noted, continue skin surveillance.     8. Thalassemia minor  Noted. Stable and without anemia.     Services suggested: No services needed at this time  Health Care Screening recommendations as per orders if indicated.  Referrals offered: PT/OT/Nutrition counseling/Behavioral Health/Smoking cessation as per orders if indicated.    Discussion today about general wellness and lifestyle habits:    · Prevent falls and reduce trip hazards; " Cautioned about securing or removing rugs.  · Have a working fire alarm and carbon monoxide detector;   · Engage in regular physical activity and social activities       Follow-up: 6 months

## 2018-05-21 ENCOUNTER — TELEPHONE (OUTPATIENT)
Dept: PULMONOLOGY | Facility: HOSPICE | Age: 68
End: 2018-05-21

## 2018-05-21 DIAGNOSIS — J45.909 UNCOMPLICATED ASTHMA, UNSPECIFIED ASTHMA SEVERITY, UNSPECIFIED WHETHER PERSISTENT: ICD-10-CM

## 2018-05-21 DIAGNOSIS — J06.9 UPPER RESPIRATORY TRACT INFECTION, UNSPECIFIED TYPE: ICD-10-CM

## 2018-05-21 RX ORDER — ALBUTEROL SULFATE 2.5 MG/3ML
2.5 SOLUTION RESPIRATORY (INHALATION) EVERY 4 HOURS PRN
Qty: 75 ML | Refills: 1 | Status: SHIPPED | OUTPATIENT
Start: 2018-05-21 | End: 2018-06-08

## 2018-05-21 RX ORDER — DOXYCYCLINE HYCLATE 100 MG/1
100 CAPSULE ORAL 2 TIMES DAILY
Qty: 20 CAP | Refills: 0 | Status: SHIPPED | OUTPATIENT
Start: 2018-05-21 | End: 2018-12-05

## 2018-05-21 NOTE — TELEPHONE ENCOUNTER
Caller Name:  Jacinda Patiño  Call Back Number: [phone] (home)      Patient approves a detailed voicemail message: yes    Patient's symptoms    Consititutional: dyspnea, productive cough     Fever: unknown     Cough: productive of dark yellow sputum    Is this a new symptom: Yes    Onset of symptom: several days ago    Has the patient tried anything to resolve symptoms: Yes    Has the patient taken their nebulizer/rescue inhaler: yes    Additional details: mucinex, z nicolasa, and pred taper     Action taken per Active Symptom guide: Urgent Care recommended, pt just to see if she could have some doxycycline before she resorts to the UC.     Patient agrees to recommended action per active symptom guide

## 2018-05-23 ENCOUNTER — OFFICE VISIT (OUTPATIENT)
Dept: URGENT CARE | Facility: CLINIC | Age: 68
End: 2018-05-23
Payer: MEDICARE

## 2018-05-23 ENCOUNTER — APPOINTMENT (OUTPATIENT)
Dept: RADIOLOGY | Facility: IMAGING CENTER | Age: 68
End: 2018-05-23
Attending: PHYSICIAN ASSISTANT
Payer: MEDICARE

## 2018-05-23 VITALS
TEMPERATURE: 97 F | WEIGHT: 165 LBS | HEART RATE: 72 BPM | DIASTOLIC BLOOD PRESSURE: 76 MMHG | BODY MASS INDEX: 28.17 KG/M2 | RESPIRATION RATE: 16 BRPM | HEIGHT: 64 IN | OXYGEN SATURATION: 95 % | SYSTOLIC BLOOD PRESSURE: 130 MMHG

## 2018-05-23 DIAGNOSIS — J45.20 MILD INTERMITTENT ASTHMA WITHOUT COMPLICATION: ICD-10-CM

## 2018-05-23 DIAGNOSIS — R05.9 COUGH: ICD-10-CM

## 2018-05-23 PROCEDURE — 99214 OFFICE O/P EST MOD 30 MIN: CPT | Mod: 25 | Performed by: PHYSICIAN ASSISTANT

## 2018-05-23 PROCEDURE — 96372 THER/PROPH/DIAG INJ SC/IM: CPT | Performed by: PHYSICIAN ASSISTANT

## 2018-05-23 PROCEDURE — 71046 X-RAY EXAM CHEST 2 VIEWS: CPT | Mod: TC | Performed by: PHYSICIAN ASSISTANT

## 2018-05-23 RX ORDER — IPRATROPIUM BROMIDE AND ALBUTEROL SULFATE 2.5; .5 MG/3ML; MG/3ML
3 SOLUTION RESPIRATORY (INHALATION) EVERY 6 HOURS PRN
Qty: 30 BULLET | Refills: 1 | Status: SHIPPED | OUTPATIENT
Start: 2018-05-23 | End: 2018-06-11 | Stop reason: SDUPTHER

## 2018-05-23 RX ORDER — METHYLPREDNISOLONE SODIUM SUCCINATE 125 MG/2ML
125 INJECTION, POWDER, LYOPHILIZED, FOR SOLUTION INTRAMUSCULAR; INTRAVENOUS ONCE
Status: COMPLETED | OUTPATIENT
Start: 2018-05-23 | End: 2018-05-23

## 2018-05-23 RX ORDER — CEFDINIR 300 MG/1
300 CAPSULE ORAL 2 TIMES DAILY
Qty: 20 CAP | Refills: 0 | Status: SHIPPED | OUTPATIENT
Start: 2018-05-23 | End: 2018-06-02

## 2018-05-23 RX ADMIN — METHYLPREDNISOLONE SODIUM SUCCINATE 125 MG: 125 INJECTION, POWDER, LYOPHILIZED, FOR SOLUTION INTRAMUSCULAR; INTRAVENOUS at 09:42

## 2018-05-23 ASSESSMENT — ENCOUNTER SYMPTOMS
SHORTNESS OF BREATH: 1
COUGH: 1
CONSTITUTIONAL NEGATIVE: 1
FEVER: 0
WHEEZING: 1
SPUTUM PRODUCTION: 0
CARDIOVASCULAR NEGATIVE: 1

## 2018-05-23 NOTE — PROGRESS NOTES
"Subjective:      Jacinda Patiño is a 68 y.o. female who presents with Asthma (asthma flare up started 5/17/18)            Asthma   She complains of cough, shortness of breath and wheezing. There is no sputum production. This is a new problem. The current episode started in the past 7 days. The problem occurs intermittently. The problem has been unchanged. Pertinent negatives include no fever. Her symptoms are aggravated by any activity. Her symptoms are alleviated by beta-agonist. She reports minimal improvement on treatment. Her past medical history is significant for asthma.       Review of Systems   Constitutional: Negative.  Negative for fever.   Respiratory: Positive for cough, shortness of breath and wheezing. Negative for sputum production.    Cardiovascular: Negative.    Skin: Negative.           Objective:     /76   Pulse 72   Temp 36.1 °C (97 °F)   Resp 16   Ht 1.626 m (5' 4.02\")   Wt 74.8 kg (165 lb)   SpO2 95%   BMI 28.31 kg/m²      Physical Exam   Constitutional: She is oriented to person, place, and time. She appears well-developed and well-nourished. No distress.   HENT:   Head: Normocephalic and atraumatic.   Eyes: EOM are normal. Pupils are equal, round, and reactive to light.   Neck: Normal range of motion. Neck supple.   Cardiovascular: Normal rate.    Pulmonary/Chest: Effort normal and breath sounds normal. No respiratory distress. She has no wheezes. She has no rales.   Neurological: She is alert and oriented to person, place, and time.   Skin: Skin is warm and dry.   Psychiatric: She has a normal mood and affect. Her behavior is normal.   Nursing note and vitals reviewed.    Active Ambulatory Problems     Diagnosis Date Noted   • H/O breast surgery 03/12/2015   • HLD (hyperlipidemia) 07/07/2015   • Hormone replacement therapy (postmenopausal) 07/07/2015   • Thalassemia minor 07/07/2015   • GERD (gastroesophageal reflux disease) 05/05/2016   • Mild intermittent asthma without " complication 07/19/2016   • Personal history of other malignant neoplasm of skin 08/01/2011   • Hiatal hernia 05/16/2018   • Breast ductal hyperplasia, atypical 05/16/2018     Resolved Ambulatory Problems     Diagnosis Date Noted   • Hip pain 03/12/2015   • Menopausal symptoms 07/07/2015   • Mild intermittent asthma without complication 07/07/2015   • Right knee pain 07/07/2015   • Obesity, Class I, BMI 30-34.9 07/07/2015   • Asthma 05/05/2016   • Bronchitis 05/05/2016   • Post-nasal drip 05/05/2016   • Hoarseness 06/23/2016   • Dyspnea on exertion 01/04/2018     Past Medical History:   Diagnosis Date   • HLD (hyperlipidemia) 7/7/2015   • Mild intermittent asthma without complication 7/19/2016     Current Outpatient Prescriptions on File Prior to Visit   Medication Sig Dispense Refill   • doxycycline (VIBRAMYCIN) 100 MG Cap Take 1 Cap by mouth 2 times a day. Take until gone. 20 Cap 0   • albuterol (PROVENTIL) 2.5mg/3ml Nebu Soln solution for nebulization 3 mL by Nebulization route every four hours as needed for Shortness of Breath. 75 mL 1   • pantoprazole (PROTONIX) 40 MG Tablet Delayed Response Take 40 mg by mouth every day.     • saliva substitute (BIOTENE) Solution Spray  in mouth/throat every day.     • PROAIR  (90 Base) MCG/ACT Aero Soln inhalation aerosol USE 2 INHALATIONS EVERY 6 HOURS AS NEEDED FOR SHORTNESS OF BREATH 25.5 g 1   • acetaminophen (TYLENOL) 325 MG Tab Take 650 mg by mouth.     • Cholecalciferol 1000 units Chew Tab Take 5,000 Units by mouth.     • CycloSPORINE (RESTASIS OP) by Ophthalmic route.     • Flaxseed Oil (LINSEED OIL) Oil 1400mg daily     • fluticasone-salmeterol (ADVAIR HFA) 230-21 MCG/ACT inhaler 2 Puffs.     • MULTIPLE VITAMIN PO Take 1 tablet by mouth.     • predniSONE (DELTASONE) 20 MG Tab      • montelukast (SINGULAIR) 10 MG Tab Take 1 Tab by mouth every day. 90 Tab 3   • azithromycin (ZITHROMAX) 250 MG Tab Take 2 tablets on day 1, then take 1 tablet a day for 4 days. 6  Tab 0   • MethylPREDNISolone (MEDROL DOSEPAK) 4 MG Tablet Therapy Pack Take as directed. 21 Tab 0   • Probiotic Product (ALIGN PO) Take  by mouth.     • Nasal Wash (ALKALOL) Solution Spray  in nose.     • GuaiFENesin (MUCINEX PO) Take  by mouth.     • simvastatin (ZOCOR) 20 MG Tab TAKE 1 TABLET EVERY EVENING 90 Tab 3   • triazolam (HALCION) 0.125 MG tablet Take 1 Tab by mouth at bedtime as needed. 90 Tab 1   • Estradiol-Norethindrone Acet 0.5-0.1 MG Tab TAKE 1 TABLET DAILY 84 Tab 3   • budesonide-formoterol (SYMBICORT) 80-4.5 MCG/ACT Aerosol Inhale 2 Puffs by mouth 2 Times a Day. Use spacer. Rinse mouth after each use. 1 Inhaler 5   • Naproxen (NAPROSYN PO) Take  by mouth.     • cyclosporin (RESTASIS) 0.05 % ophthalmic emulsion Place 1 Drop in both eyes 2 times a day.     • polyethylene glycol 3350 (MIRALAX) Powder Take 17 g by mouth every day.     • ibuprofen (MOTRIN) 600 MG TABS Take 600 mg by mouth every 6 hours as needed.     • aspirin EC (ECOTRIN) 81 MG TBEC Take 81 mg by mouth every day.     • Cyanocobalamin (VITAMIN B 12 PO) Take  by mouth.       No current facility-administered medications on file prior to visit.      Social History     Social History   • Marital status:      Spouse name: N/A   • Number of children: N/A   • Years of education: N/A     Occupational History   • Education       Retired     Social History Main Topics   • Smoking status: Never Smoker   • Smokeless tobacco: Never Used   • Alcohol use No   • Drug use: No   • Sexual activity: Not on file     Other Topics Concern   • Not on file     Social History Narrative   • No narrative on file     Family History   Problem Relation Age of Onset   • Heart Attack Mother    • Heart Disease Mother    • Hyperlipidemia Mother    • Hypertension Mother    • Stroke Mother    • Heart Attack Maternal Aunt      Patient has no known allergies.         cxr ng  (read/interpret. By me. Rw)       Assessment/Plan:     ·  cough, asthma      · rx meds; otc  prn

## 2018-05-25 ENCOUNTER — OFFICE VISIT (OUTPATIENT)
Dept: PULMONOLOGY | Facility: HOSPICE | Age: 68
End: 2018-05-25
Payer: MEDICARE

## 2018-05-25 VITALS
SYSTOLIC BLOOD PRESSURE: 129 MMHG | BODY MASS INDEX: 28.34 KG/M2 | RESPIRATION RATE: 16 BRPM | OXYGEN SATURATION: 95 % | TEMPERATURE: 97.7 F | WEIGHT: 166 LBS | HEIGHT: 64 IN | DIASTOLIC BLOOD PRESSURE: 88 MMHG | HEART RATE: 60 BPM

## 2018-05-25 DIAGNOSIS — J45.41 MODERATE PERSISTENT ASTHMA WITH ACUTE EXACERBATION: ICD-10-CM

## 2018-05-25 DIAGNOSIS — I27.20 PULMONARY HYPERTENSION (HCC): ICD-10-CM

## 2018-05-25 DIAGNOSIS — R06.09 DYSPNEA ON EXERTION: ICD-10-CM

## 2018-05-25 PROCEDURE — 99214 OFFICE O/P EST MOD 30 MIN: CPT | Performed by: NURSE PRACTITIONER

## 2018-05-25 NOTE — PROGRESS NOTES
Chief Complaint   Patient presents with   • Follow-Up     asthma exacerbation        HPI:  Jacinda Patiño is a 68 y.o. year old female here today for acute symptoms. Last OV was 1/4/18 with Dr. Abdullahi.   History of Asthma with recent recurrent exacerbations. Her last exacerbation was 12/2017. Ct scan was normal, oxyge levels were normal and she was treated for URI. Within the last 2 weeks she developed symptoms with increased dyspnea, heavy chest, cough with yellow phlegm, nasal congestion x's 1 week and no wheezing. Her  was recently sick. She denies chest pain or hemoptysis. She started zpak 5/17/18. She started prednisone taper 5/16 and continues to be on 20mg today. She did not have much relief and started on doxycycline by our office. She was then seen in  5/23/18 with clear CXR and started on cefdinir and given solumedol IM injection. She was also given Duoneb to use in nebulizer instead of albuterol. She has not started that. Peak flow today 330/400.    She is currently compliant on Symbicort 80/4.5 mcg 2 puffs INH bid along with a Proair HFA inhaler. She is also on Singulair.     She was just seen by Dr. Richmond for allergy consult and given sample of Breo 200mcg 1puff QD to try. Encouraged her to continue with this. RX sent. Prior allergy panel not remarkable - dust mites and dander noted per Dr. Abdullahi.     Dr. Abdullahi was curious about pulmonary htn due to complaint of dyspnea at last OV, ROSEANNA 1/29/18 indicated LVEF 65%, mild left ventricular hypertrophy, and RVSP 40mmHg. Diastolic dysfunction could not be assessed. She saw Dr. Kallie Rodriguez Cardio and noted to be stable but wanted to review ECHO. She was not followed up. She has symptoms of daytime fatigue, no feeling refreshed upon waking. She sleeps about 6hrs on avg and use to be close to 8hrs. She exercises daily walking 3-5miles.     Recent CBC noted mild anemia but normal iron studies. Recent homocysteine level slightly elevated and  "normal TSH. Prior BNP normal.    ROS: As per HPI and otherwise negative if not stated.    Past Medical History:   Diagnosis Date   • HLD (hyperlipidemia) 7/7/2015   • Mild intermittent asthma without complication 7/19/2016       Past Surgical History:   Procedure Laterality Date   • KNEE ARTHROSCOPY Right 2015   • LUMPECTOMY Left 4/2012   • BASAL CELL EXCISION  2010    Forehead    • BLEPHAROPLASTY  2010   • SHOULDER ARTHROSCOPY  1992   • HERNIA REPAIR  1990    inguinal    • LAPAROSCOPY  1984    AGA   • PB REMV 2ND CATARACT,CORN-SCLER SECTN         Family History   Problem Relation Age of Onset   • Heart Attack Mother    • Heart Disease Mother    • Hyperlipidemia Mother    • Hypertension Mother    • Stroke Mother    • Heart Attack Maternal Aunt        Social History     Social History   • Marital status:      Spouse name: N/A   • Number of children: N/A   • Years of education: N/A     Occupational History   • Education       Retired     Social History Main Topics   • Smoking status: Never Smoker   • Smokeless tobacco: Never Used   • Alcohol use No   • Drug use: No   • Sexual activity: Not on file     Other Topics Concern   • Not on file     Social History Narrative   • No narrative on file       Allergies as of 05/25/2018   • (No Known Allergies)        @Vital signs for this encounter:  Vitals:    05/25/18 0836   Height: 1.626 m (5' 4.02\")   Weight: 75.3 kg (166 lb)   Weight % change since last entry.: 0 %   BP: 129/88   Pulse: 60   BMI (Calculated): 28.48   Resp: 16   Temp: 36.5 °C (97.7 °F)   O2 sat % room air: 95 %       Current medications as of today   Current Outpatient Prescriptions   Medication Sig Dispense Refill   • Fluticasone Furoate-Vilanterol (BREO ELLIPTA) 200-25 MCG/INH AEROSOL POWDER, BREATH ACTIVATED Inhale 3 Puffs by mouth every day. Rinse mouth after use. 3 Each 3   • cefdinir (OMNICEF) 300 MG Cap Take 1 Cap by mouth 2 times a day for 10 days. 20 Cap 0   • ipratropium-albuterol (DUONEB) " 0.5-2.5 (3) MG/3ML nebulizer solution 3 mL by Nebulization route every 6 hours as needed for Shortness of Breath. 30 Bullet 1   • doxycycline (VIBRAMYCIN) 100 MG Cap Take 1 Cap by mouth 2 times a day. Take until gone. 20 Cap 0   • albuterol (PROVENTIL) 2.5mg/3ml Nebu Soln solution for nebulization 3 mL by Nebulization route every four hours as needed for Shortness of Breath. 75 mL 1   • pantoprazole (PROTONIX) 40 MG Tablet Delayed Response Take 40 mg by mouth every day.     • saliva substitute (BIOTENE) Solution Spray  in mouth/throat every day.     • PROAIR  (90 Base) MCG/ACT Aero Soln inhalation aerosol USE 2 INHALATIONS EVERY 6 HOURS AS NEEDED FOR SHORTNESS OF BREATH 25.5 g 1   • Cholecalciferol 1000 units Chew Tab Take 5,000 Units by mouth.     • Flaxseed Oil (LINSEED OIL) Oil 1400mg daily     • MULTIPLE VITAMIN PO Take 1 tablet by mouth.     • predniSONE (DELTASONE) 20 MG Tab      • montelukast (SINGULAIR) 10 MG Tab Take 1 Tab by mouth every day. 90 Tab 3   • Probiotic Product (ALIGN PO) Take  by mouth.     • Nasal Wash (ALKALOL) Solution Spray  in nose.     • simvastatin (ZOCOR) 20 MG Tab TAKE 1 TABLET EVERY EVENING 90 Tab 3   • Estradiol-Norethindrone Acet 0.5-0.1 MG Tab TAKE 1 TABLET DAILY 84 Tab 3   • cyclosporin (RESTASIS) 0.05 % ophthalmic emulsion Place 1 Drop in both eyes 2 times a day.     • aspirin EC (ECOTRIN) 81 MG TBEC Take 81 mg by mouth every day.     • Cyanocobalamin (VITAMIN B 12 PO) Take  by mouth.     • acetaminophen (TYLENOL) 325 MG Tab Take 650 mg by mouth.     • CycloSPORINE (RESTASIS OP) by Ophthalmic route.     • azithromycin (ZITHROMAX) 250 MG Tab Take 2 tablets on day 1, then take 1 tablet a day for 4 days. 6 Tab 0   • MethylPREDNISolone (MEDROL DOSEPAK) 4 MG Tablet Therapy Pack Take as directed. 21 Tab 0   • GuaiFENesin (MUCINEX PO) Take  by mouth.     • triazolam (HALCION) 0.125 MG tablet Take 1 Tab by mouth at bedtime as needed. 90 Tab 1   • Naproxen (NAPROSYN PO) Take  by  mouth.     • polyethylene glycol 3350 (MIRALAX) Powder Take 17 g by mouth every day.     • ibuprofen (MOTRIN) 600 MG TABS Take 600 mg by mouth every 6 hours as needed.       No current facility-administered medications for this visit.          Physical Exam:   Gen:           Alert and oriented, No apparent distress. Mood and affect appropriate, normal interaction with examiner.  Eyes:          PERRL, EOM intact, sclere white, conjunctive moist.  Ears:          Not examined.   Hearing:     Grossly intact.  Nose:          Normal, no lesions or deformities.  Dentition:    Good dentition.  Oropharynx:   Tongue normal, posterior pharynx without erythema or exudate.  Mallampati Classification: 2/3  Neck:        Supple, trachea midline, no masses.  Respiratory Effort: No intercostal retractions or use of accessory muscles.   Lung Auscultation:      Rhonchi t/o with congestion and trace wheeze to bilateral bases. No rales.  CV:            Regular rate and rhythm. No murmurs, rubs or gallops.  Abd:           Not examined.   Lymphadenopathy: Not examined.  Gait and Station: Normal.  Digits and Nails: No clubbing, cyanosis, petechiae, or nodes.   Cranial Nerves: II-XII grossly intact.  Skin:        No rashes, lesions or ulcers noted.               Ext:           No cyanosis or edema.      Assessment:  1. Dyspnea on exertion  OVERNIGHT HOME SLEEP STUDY    DME OTHER   2. Moderate persistent asthma with acute exacerbation  OVERNIGHT HOME SLEEP STUDY    DME OTHER   3. Pulmonary hypertension (HCC)  OVERNIGHT HOME SLEEP STUDY   4. BMI 28.0-28.9,adult         Immunizations:    Flu:9/2017  Pneumovax 23:2014  Prevnar 13:2015    Plan:  1. HST once symptoms improve to r/o sleep apnea. F/u with cardiology as well.  2. Continue inhaler regimen. Recommend replacing symbicort with Breo 200mcg 1puff Qd. RX sent.  3. Continue PPI.  4. Continue current antibiotic regimen till completed and taper predisone now with 20mg x 2 days, then 10mg x 3  days, then stop.  5. Discussed respiratory hygiene.  6. Follow up with Dr. Bradly BOLAND to review sleep study and continued dyspnea on exertion with recurrent exacerbations, sooner if needed.

## 2018-05-31 ENCOUNTER — TELEPHONE (OUTPATIENT)
Dept: PULMONOLOGY | Facility: HOSPICE | Age: 68
End: 2018-05-31

## 2018-05-31 NOTE — TELEPHONE ENCOUNTER
[facility] states that for the rx Breo Ellipta the pt's insurance company will only pay for 1 inhalation Daily dosing for Breo Ellipta regardless of strength or diagnosis.  Directions are:  Inhale 3 puffs by mouth every day. Rinse mouth after use.    Last seen: 5/25/18Jaquelin Velazquez  Next ov: 10/30/18 (pulm office)  Asthma exacerbation

## 2018-05-31 NOTE — TELEPHONE ENCOUNTER
Pt notified of Inna Mojica's last message and states that she didn't even pay attention to the directions on the bottle but has only been taking 1 puff daily.

## 2018-06-08 DIAGNOSIS — J45.20 MILD INTERMITTENT ASTHMA WITHOUT COMPLICATION: ICD-10-CM

## 2018-06-08 RX ORDER — ALBUTEROL SULFATE 90 UG/1
AEROSOL, METERED RESPIRATORY (INHALATION)
Qty: 3 INHALER | Refills: 3 | Status: SHIPPED | OUTPATIENT
Start: 2018-06-08 | End: 2019-01-18 | Stop reason: SDUPTHER

## 2018-06-08 NOTE — TELEPHONE ENCOUNTER
Have we ever prescribed this med? Yes.  If yes, what date? 1/17/18    Last OV: 5/25/18- Dr. Verdin    Next OV: 7/6/18    DX: Mild intermittent asthma w/o comp    Medications: Proair HFA

## 2018-06-11 DIAGNOSIS — R05.9 COUGH: ICD-10-CM

## 2018-06-11 DIAGNOSIS — J45.20 MILD INTERMITTENT ASTHMA WITHOUT COMPLICATION: ICD-10-CM

## 2018-06-12 RX ORDER — IPRATROPIUM BROMIDE AND ALBUTEROL SULFATE 2.5; .5 MG/3ML; MG/3ML
3 SOLUTION RESPIRATORY (INHALATION) EVERY 6 HOURS PRN
Qty: 30 BULLET | Refills: 4 | Status: SHIPPED | OUTPATIENT
Start: 2018-06-12 | End: 2020-01-23 | Stop reason: SDUPTHER

## 2018-06-19 ENCOUNTER — APPOINTMENT (OUTPATIENT)
Dept: SLEEP MEDICINE | Facility: MEDICAL CENTER | Age: 68
End: 2018-06-19
Payer: MEDICARE

## 2018-07-05 ENCOUNTER — HOME STUDY (OUTPATIENT)
Dept: SLEEP MEDICINE | Facility: MEDICAL CENTER | Age: 68
End: 2018-07-05
Attending: NURSE PRACTITIONER
Payer: MEDICARE

## 2018-07-05 DIAGNOSIS — I27.20 PULMONARY HYPERTENSION (HCC): ICD-10-CM

## 2018-07-05 DIAGNOSIS — J45.41 MODERATE PERSISTENT ASTHMA WITH ACUTE EXACERBATION: ICD-10-CM

## 2018-07-05 DIAGNOSIS — R06.09 DYSPNEA ON EXERTION: ICD-10-CM

## 2018-07-05 PROCEDURE — G0399 HOME SLEEP TEST/TYPE 3 PORTA: HCPCS | Performed by: INTERNAL MEDICINE

## 2018-07-06 ENCOUNTER — OFFICE VISIT (OUTPATIENT)
Dept: PULMONOLOGY | Facility: HOSPICE | Age: 68
End: 2018-07-06
Payer: MEDICARE

## 2018-07-06 VITALS
DIASTOLIC BLOOD PRESSURE: 80 MMHG | RESPIRATION RATE: 14 BRPM | SYSTOLIC BLOOD PRESSURE: 128 MMHG | HEIGHT: 64 IN | HEART RATE: 80 BPM | BODY MASS INDEX: 27.66 KG/M2 | OXYGEN SATURATION: 97 % | WEIGHT: 162 LBS | TEMPERATURE: 98 F

## 2018-07-06 DIAGNOSIS — J45.20 MILD INTERMITTENT ASTHMA WITHOUT COMPLICATION: ICD-10-CM

## 2018-07-06 DIAGNOSIS — J20.9 ACUTE BRONCHITIS, UNSPECIFIED ORGANISM: ICD-10-CM

## 2018-07-06 PROCEDURE — 99214 OFFICE O/P EST MOD 30 MIN: CPT | Performed by: INTERNAL MEDICINE

## 2018-07-06 RX ORDER — METHYLPREDNISOLONE 4 MG/1
TABLET ORAL
Qty: 21 TAB | Refills: 0 | Status: SHIPPED | OUTPATIENT
Start: 2018-07-06 | End: 2019-01-15

## 2018-07-06 RX ORDER — BUDESONIDE AND FORMOTEROL FUMARATE DIHYDRATE 160; 4.5 UG/1; UG/1
2 AEROSOL RESPIRATORY (INHALATION)
COMMUNITY
Start: 2016-10-04 | End: 2018-12-05

## 2018-07-06 RX ORDER — IBUPROFEN 600 MG/1
600 TABLET ORAL
COMMUNITY
End: 2019-01-15

## 2018-07-06 RX ORDER — FLAXSEED OIL
OIL (ML) MISCELLANEOUS
COMMUNITY
End: 2018-12-05

## 2018-07-06 RX ORDER — ESTRADIOL AND NORETHINDRONE ACETATE 1; .5 MG/1; MG/1
0.5 TABLET ORAL
COMMUNITY
Start: 2015-09-23 | End: 2018-12-05 | Stop reason: SDUPTHER

## 2018-07-06 RX ORDER — LANOLIN ALCOHOL/MO/W.PET/CERES
300 CREAM (GRAM) TOPICAL
COMMUNITY
Start: 2010-07-27 | End: 2018-12-05

## 2018-07-06 RX ORDER — FLUTICASONE PROPIONATE AND SALMETEROL XINAFOATE 230; 21 UG/1; UG/1
2 AEROSOL, METERED RESPIRATORY (INHALATION)
COMMUNITY
End: 2019-01-15

## 2018-07-06 RX ORDER — BUDESONIDE AND FORMOTEROL FUMARATE DIHYDRATE 80; 4.5 UG/1; UG/1
2 AEROSOL RESPIRATORY (INHALATION)
COMMUNITY
End: 2018-12-05

## 2018-07-06 RX ORDER — ALBUTEROL SULFATE 90 UG/1
2 AEROSOL, METERED RESPIRATORY (INHALATION)
COMMUNITY
Start: 2014-11-04 | End: 2018-12-05

## 2018-07-06 RX ORDER — MONTELUKAST SODIUM 10 MG/1
10 TABLET ORAL
COMMUNITY
Start: 2014-11-04 | End: 2018-12-05 | Stop reason: SDUPTHER

## 2018-07-06 RX ORDER — SIMVASTATIN 20 MG
20 TABLET ORAL
COMMUNITY
Start: 2014-11-04 | End: 2018-12-05 | Stop reason: SDUPTHER

## 2018-07-06 RX ORDER — AZITHROMYCIN 250 MG/1
TABLET, FILM COATED ORAL
Qty: 6 TAB | Refills: 0 | Status: SHIPPED | OUTPATIENT
Start: 2018-07-06 | End: 2018-12-05

## 2018-07-06 RX ORDER — OMEPRAZOLE 40 MG/1
40 CAPSULE, DELAYED RELEASE ORAL
COMMUNITY
Start: 2014-11-04 | End: 2019-01-15 | Stop reason: SDUPTHER

## 2018-07-06 RX ORDER — ASCORBIC ACID 125 MG
TABLET,CHEWABLE ORAL
COMMUNITY
End: 2018-12-05

## 2018-07-06 RX ORDER — GUAIFENESIN 600 MG/1
1200 TABLET, EXTENDED RELEASE ORAL
COMMUNITY
End: 2018-12-05

## 2018-07-06 RX ORDER — CYANOCOBALAMIN 1000 UG/ML
1000 INJECTION, SOLUTION INTRAMUSCULAR; SUBCUTANEOUS
COMMUNITY
Start: 2013-11-30 | End: 2019-04-11

## 2018-07-06 NOTE — PROGRESS NOTES
Jacinda Patiño is a 68 y.o. female here for recent acute bronchitis and results of home sleep study. Patient was referred by her primary care physician.    History of Present Illness:       had 2 bouts of bronchitis in the last 6 months.  She was concerned that this might reflect a new pattern, however she had one prior about the year before, I do not think this represents a significant deterioration.  Her last x-ray done in May, the 23rd of this year was clear.  She does not have signs or symptoms to suggest bronchiectasis.  She saw an allergist and has no clear-cut allergies by skin testing.    She is back to her baseline.  An episode in May did require prednisone and doxycycline, and additional antibiotic was given to her by urgent care.    She is back to baseline, peak flows were up to 450.  Her home sleep study did not show evidence of significant sleep disordered breathing or sustained desaturation.    She is planning to travel, I have given her a Medrol Dosepak, Z-Lalo to take with her in the event she develops purulent bronchitis.  We will see her back in 6 months, she can call sooner for problems and we reviewed the need for urgent care or emergency room if symptoms were progressive with shortness of breath unresponsive to the initiated steroid and antibiotic course.    Constitutional ROS: No unexpected change in weight, No unexplained fevers  Eyes: No change in vision or blurring or double vision  Mouth/Throat ROS: No sore throat, No recent change in voice or hoarseness  Pulmonary ROS: See present history for pertinent positives  Cardiovascular ROS: No chest pain to suggest acute coronary syndrome  Gastrointestinal ROS: No abdominal pain to suggest peptic disease  Musculoskeletal/Extremities ROS: no acute artritis or unusual swelling  Hematologic/Lymphatic ROS: No easy bleeding or unusual lymph node swelling  Neurologic ROS: No new or unusual weakness  Psychiatric ROS: No  hallucinations  Allergic/Immunologic: No  urticaria or allergic rash      Current Outpatient Prescriptions   Medication Sig Dispense Refill   • budesonide-formoterol (SYMBICORT) 160-4.5 MCG/ACT Aerosol 2 Puffs by Nebulization route.     • budesonide-formoterol (SYMBICORT) 160-4.5 MCG/ACT Aerosol 2 Puffs by Nebulization route.     • NASAL WASH NA Spray  in nose.     • omeprazole (PRILOSEC) 40 MG delayed-release capsule Take 40 mg by mouth.     • albuterol 108 (90 Base) MCG/ACT Aero Soln inhalation aerosol 2 Puffs by Nebulization route.     • aspirin 81 MG tablet Take 81 mg by mouth.     • cyanocobalamin (VITAMIN B-12) 1000 MCG/ML Solution 1,000 mcg by Intramuscular route.     • estradiol-norethindrone (ACTIVELLA) 1-0.5 MG per tablet Take 0.5 tablet by mouth.     • montelukast (SINGULAIR) 10 MG Tab Take 10 mg by mouth.     • simvastatin (ZOCOR) 20 MG Tab Take 20 mg by mouth.     • azithromycin (ZITHROMAX) 250 MG Tab Take 2 tablets on day 1, then take 1 tablet a day for 4 days. 6 Tab 0   • MethylPREDNISolone (MEDROL DOSEPAK) 4 MG Tablet Therapy Pack Take as directed. 21 Tab 0   • ipratropium-albuterol (DUONEB) 0.5-2.5 (3) MG/3ML nebulizer solution 3 mL by Nebulization route every 6 hours as needed for Shortness of Breath. 30 Bullet 4   • albuterol (PROAIR HFA) 108 (90 Base) MCG/ACT Aero Soln inhalation aerosol USE 2 INHALATIONS EVERY 6 HOURS AS NEEDED FOR SHORTNESS OF BREATH 3 Inhaler 3   • Fluticasone Furoate-Vilanterol (BREO ELLIPTA) 200-25 MCG/INH AEROSOL POWDER, BREATH ACTIVATED Inhale 1 Puff by mouth every day. Rinse mouth after use. 3 Each 3   • pantoprazole (PROTONIX) 40 MG Tablet Delayed Response Take 40 mg by mouth every day.     • acetaminophen (TYLENOL) 325 MG Tab Take 650 mg by mouth.     • Flaxseed Oil (LINSEED OIL) Oil 1400mg daily     • montelukast (SINGULAIR) 10 MG Tab Take 1 Tab by mouth every day. 90 Tab 3   • GuaiFENesin (MUCINEX PO) Take  by mouth.     • simvastatin (ZOCOR) 20 MG Tab TAKE 1 TABLET  EVERY EVENING 90 Tab 3   • Cyanocobalamin (VITAMIN B 12 PO) Take  by mouth.     • Biotin 300 MCG Tab Take 300 mcg by mouth.     • budesonide-formoterol (SYMBICORT) 80-4.5 MCG/ACT Aerosol 2 Puffs by Nebulization route.     • fluticasone-salmeterol (ADVAIR HFA) 230-21 MCG/ACT inhaler 2 Puffs by Nebulization route.     • PSYLLIUM PO Take  by mouth.     • Cyanocobalamin (B-12) 5000 MCG Cap Take  by mouth.     • Flaxseed Oil (LINSEED OIL) Oil by Does not apply route.     • guaiFENesin LA (MUCINEX) 600 MG TABLET SR 12 HR Take 1,200 mg by mouth.     • ibuprofen (MOTRIN) 600 MG Tab Take 600 mg by mouth.     • doxycycline (VIBRAMYCIN) 100 MG Cap Take 1 Cap by mouth 2 times a day. Take until gone. 20 Cap 0   • saliva substitute (BIOTENE) Solution Spray  in mouth/throat every day.     • Cholecalciferol 1000 units Chew Tab Take 5,000 Units by mouth.     • CycloSPORINE (RESTASIS OP) by Ophthalmic route.     • MULTIPLE VITAMIN PO Take 1 tablet by mouth.     • predniSONE (DELTASONE) 20 MG Tab      • MethylPREDNISolone (MEDROL DOSEPAK) 4 MG Tablet Therapy Pack Take as directed. 21 Tab 0   • Probiotic Product (ALIGN PO) Take  by mouth.     • Nasal Wash (ALKALOL) Solution Spray  in nose.     • triazolam (HALCION) 0.125 MG tablet Take 1 Tab by mouth at bedtime as needed. 90 Tab 1   • Estradiol-Norethindrone Acet 0.5-0.1 MG Tab TAKE 1 TABLET DAILY 84 Tab 3   • Naproxen (NAPROSYN PO) Take  by mouth.     • cyclosporin (RESTASIS) 0.05 % ophthalmic emulsion Place 1 Drop in both eyes 2 times a day.     • polyethylene glycol 3350 (MIRALAX) Powder Take 17 g by mouth every day.     • ibuprofen (MOTRIN) 600 MG TABS Take 600 mg by mouth every 6 hours as needed.     • aspirin EC (ECOTRIN) 81 MG TBEC Take 81 mg by mouth every day.       No current facility-administered medications for this visit.        Social History   Substance Use Topics   • Smoking status: Never Smoker   • Smokeless tobacco: Never Used   • Alcohol use No        Past Medical  "History:   Diagnosis Date   • HLD (hyperlipidemia) 7/7/2015   • Mild intermittent asthma without complication 7/19/2016       Past Surgical History:   Procedure Laterality Date   • KNEE ARTHROSCOPY Right 2015   • LUMPECTOMY Left 4/2012   • BASAL CELL EXCISION  2010    Forehead    • BLEPHAROPLASTY  2010   • SHOULDER ARTHROSCOPY  1992   • HERNIA REPAIR  1990    inguinal    • LAPAROSCOPY  1984    AGA   • PB REMV 2ND CATARACT,CORN-SCLER SECTN         Allergies: Patient has no known allergies.    Family History   Problem Relation Age of Onset   • Heart Attack Mother    • Heart Disease Mother    • Hyperlipidemia Mother    • Hypertension Mother    • Stroke Mother    • Heart Attack Maternal Aunt        Physical Examination    Vitals:    07/06/18 1406   Height: 1.626 m (5' 4.02\")   Weight: 73.5 kg (162 lb)   Weight % change since last entry.: 0 %   BP: 128/80   Pulse: 80   BMI (Calculated): 27.79   Resp: 14   Temp: 36.7 °C (98 °F)       General Appearance: alert, no distress  Skin: Skin color, texture, turgor normal. No rashes or lesions.  Eyes: negative  Oropharynx: Lips, mucosa, and tongue normal. Teeth and gums normal. Oropharynx moist and without lesion  Lungs: positive findings: Remarkably clear without wheezes or rhonchi, peak flow is over 450  Heart: negative. RRR without murmur, gallop, or rubs.  No ectopy.  Abdomen: Abdomen soft, non-tender. . No masses,  No organomegaly  Extremities:  No deformities, edema, or skin discoloration  Joints: No acute arthritis  Peripheral Pulses:perfused  Neurologic: intact grossly  No clubbing or cyanosis    I (soft palate, uvula, fauces, tonsillar pillars visible)    Imaging: Recent chest x-ray was clear without acute process    PFTS: None      Assessment and Plan  1. Mild intermittent asthma without complication    2. Acute bronchitis, unspecified organism     had 2 bouts of bronchitis in the last 6 months.  She was concerned that this might reflect a new pattern, however she had " one prior about the year before, I do not think this represents a significant deterioration.  Her last x-ray done in May, the 23rd of this year was clear.  She does not have signs or symptoms to suggest bronchiectasis.  She saw an allergist and has no clear-cut allergies by skin testing.    She is back to her baseline.  An episode in May did require prednisone and doxycycline, and additional antibiotic was given to her by urgent care.    She is back to baseline, peak flows were up to 450.  Her home sleep study did not show evidence of significant sleep disordered breathing or sustained desaturation.    She is planning to travel, I have given her a Medrol Dosepak, Z-Lalo to take with her in the event she develops purulent bronchitis.  We will see her back in 6 months, she can call sooner for problems and we reviewed the need for urgent care or emergency room if symptoms were progressive with shortness of breath unresponsive to the initiated steroid and antibiotic course.  Followup Return in about 6 months (around 1/6/2019) for With MD or APRN.

## 2018-07-06 NOTE — PROCEDURES
The patient is a 68-year-old female with pulmonary hypertension, complaints of nonrestorative sleep and daytime hypersomnolence, with clinical suspicion for obstructive sleep apnea syndrome.  Home polysomnography is requested for screening for LOLI as secondary causes of pulmonary hypertension.    She has history of mild intermittent asthma which has been stable.  She has no contraindications to home sleep study monitoring.  A total recording time of 8 hours was obtained with good-quality data noted.    The patient slept principally supine throughout testing.  There were 165 snore episodes noted, associated with obstructive respiratory events.  The overall AHI was 9 events per hour, comprised principally of hypopneas.  There were associated oxygen desaturations below 88% for around 3 hours of the night, to a luis of 81%.  Average heart rate was 59 bpm.    Interpretation:  Mild obstructive sleep apnea syndrome, AHI: 9 events per hour associated with desaturations to 81% Sp02.    Recommendations:  Treatment options for mild LOLI include airway pressurization, UPPP or a dental appliance.  The patient should follow-up in sleep clinic to discuss appropriate therapy.  Avoidance of alcohol, sedatives and muscle relaxants as these can exacerbate sleep disordered breathing.

## 2018-07-06 NOTE — PATIENT INSTRUCTIONS
This lady had 2 bouts of bronchitis in the last 6 months.  She was concerned that this might reflect a new pattern, however she had one prior about the year before, I do not think this represents a significant deterioration.  Her last x-ray done in May, the 23rd of this year was clear.  She does not have signs or symptoms to suggest bronchiectasis.  She saw an allergist and has no clear-cut allergies by skin testing.    She is back to her baseline.  An episode in May did require prednisone and doxycycline, and additional antibiotic was given to her by urgent care.    She is back to baseline, peak flows were up to 450.  Her home sleep study did not show evidence of significant sleep disordered breathing or sustained desaturation.    She is planning to travel, I have given her a Medrol Dosepak, Z-Lalo to take with her in the event she develops purulent bronchitis.  We will see her back in 6 months, she can call sooner for problems and we reviewed the need for urgent care or emergency room if symptoms were progressive with shortness of breath unresponsive to the initiated steroid and antibiotic course.

## 2018-10-01 ENCOUNTER — HOSPITAL ENCOUNTER (OUTPATIENT)
Dept: LAB | Facility: MEDICAL CENTER | Age: 68
End: 2018-10-01
Attending: OPTOMETRIST
Payer: MEDICARE

## 2018-10-01 PROCEDURE — 86235 NUCLEAR ANTIGEN ANTIBODY: CPT

## 2018-10-01 PROCEDURE — 86200 CCP ANTIBODY: CPT

## 2018-10-01 PROCEDURE — 36415 COLL VENOUS BLD VENIPUNCTURE: CPT

## 2018-10-01 PROCEDURE — 86431 RHEUMATOID FACTOR QUANT: CPT

## 2018-10-03 LAB
CCP IGG SERPL-ACNC: 3 UNITS (ref 0–19)
RHEUMATOID FACT SER NEPH-ACNC: <10 IU/ML (ref 0–14)

## 2018-10-04 LAB
ENA SS-B IGG SER IA-ACNC: 0 AU/ML (ref 0–40)
SSA52 R0ENA AB IGG Q0420: 15 AU/ML (ref 0–40)
SSA60 R0ENA AB IGG Q0419: 2 AU/ML (ref 0–40)

## 2018-11-01 ENCOUNTER — HOSPITAL ENCOUNTER (OUTPATIENT)
Dept: RADIOLOGY | Facility: MEDICAL CENTER | Age: 68
End: 2018-11-01
Attending: FAMILY MEDICINE
Payer: MEDICARE

## 2018-11-01 DIAGNOSIS — Z12.39 SCREENING BREAST EXAMINATION: ICD-10-CM

## 2018-11-01 PROCEDURE — 77067 SCR MAMMO BI INCL CAD: CPT

## 2018-11-12 ENCOUNTER — PATIENT MESSAGE (OUTPATIENT)
Dept: MEDICAL GROUP | Facility: PHYSICIAN GROUP | Age: 68
End: 2018-11-12

## 2018-11-14 ENCOUNTER — HOSPITAL ENCOUNTER (OUTPATIENT)
Dept: LAB | Facility: MEDICAL CENTER | Age: 68
End: 2018-11-14
Attending: FAMILY MEDICINE
Payer: MEDICARE

## 2018-11-14 DIAGNOSIS — R94.6 ABNORMAL THYROID FUNCTION TEST: ICD-10-CM

## 2018-11-14 PROCEDURE — 84480 ASSAY TRIIODOTHYRONINE (T3): CPT

## 2018-11-14 PROCEDURE — 84442 ASSAY OF THYROID ACTIVITY: CPT

## 2018-11-14 PROCEDURE — 84443 ASSAY THYROID STIM HORMONE: CPT

## 2018-11-14 PROCEDURE — 36415 COLL VENOUS BLD VENIPUNCTURE: CPT

## 2018-11-14 PROCEDURE — 84439 ASSAY OF FREE THYROXINE: CPT

## 2018-11-14 PROCEDURE — 84482 T3 REVERSE: CPT

## 2018-11-14 PROCEDURE — 86376 MICROSOMAL ANTIBODY EACH: CPT

## 2018-11-15 LAB
T3 SERPL-MCNC: 89.9 NG/DL (ref 60–181)
T4 FREE SERPL-MCNC: 1 NG/DL (ref 0.53–1.43)
THYROPEROXIDASE AB SERPL-ACNC: 1.2 IU/ML (ref 0–9)
TSH SERPL DL<=0.005 MIU/L-ACNC: 1.72 UIU/ML (ref 0.38–5.33)

## 2018-11-16 LAB — T4BG SERPL-MCNC: 18 UG/ML (ref 13–30)

## 2018-11-19 LAB — T3REVERSE SERPL-MCNC: 20.4 NG/DL (ref 9–27)

## 2018-12-05 ENCOUNTER — OFFICE VISIT (OUTPATIENT)
Dept: MEDICAL GROUP | Facility: PHYSICIAN GROUP | Age: 68
End: 2018-12-05
Payer: MEDICARE

## 2018-12-05 VITALS
RESPIRATION RATE: 14 BRPM | OXYGEN SATURATION: 99 % | SYSTOLIC BLOOD PRESSURE: 122 MMHG | WEIGHT: 171 LBS | BODY MASS INDEX: 29.19 KG/M2 | HEART RATE: 76 BPM | HEIGHT: 64 IN | DIASTOLIC BLOOD PRESSURE: 74 MMHG | TEMPERATURE: 97.1 F

## 2018-12-05 DIAGNOSIS — J45.20 MILD INTERMITTENT ASTHMA WITHOUT COMPLICATION: ICD-10-CM

## 2018-12-05 DIAGNOSIS — F51.01 PRIMARY INSOMNIA: ICD-10-CM

## 2018-12-05 DIAGNOSIS — C44.310 FACIAL BASAL CELL CANCER: ICD-10-CM

## 2018-12-05 DIAGNOSIS — Z79.890 HORMONE REPLACEMENT THERAPY (POSTMENOPAUSAL): ICD-10-CM

## 2018-12-05 PROCEDURE — 99214 OFFICE O/P EST MOD 30 MIN: CPT | Performed by: FAMILY MEDICINE

## 2018-12-05 RX ORDER — SIMVASTATIN 20 MG
20 TABLET ORAL EVERY EVENING
Qty: 90 TAB | Refills: 3 | Status: SHIPPED | OUTPATIENT
Start: 2018-12-05 | End: 2019-11-07 | Stop reason: SDUPTHER

## 2018-12-05 RX ORDER — ESTRADIOL AND NORETHINDRONE ACETATE 1; .5 MG/1; MG/1
1 TABLET ORAL DAILY
Qty: 90 TAB | Refills: 3 | Status: SHIPPED | OUTPATIENT
Start: 2018-12-05 | End: 2019-02-19

## 2018-12-05 RX ORDER — TRIAZOLAM 0.12 MG/1
0.12 TABLET ORAL NIGHTLY PRN
Qty: 90 TAB | Refills: 1 | Status: SHIPPED
Start: 2018-12-05 | End: 2019-03-05

## 2018-12-05 RX ORDER — MONTELUKAST SODIUM 10 MG/1
10 TABLET ORAL DAILY
Qty: 90 TAB | Refills: 3 | Status: SHIPPED | OUTPATIENT
Start: 2018-12-05 | End: 2019-01-18 | Stop reason: SDUPTHER

## 2018-12-05 NOTE — PROGRESS NOTES
"Chief Complaint   Patient presents with   • Asthma     singulair   • Hyperlipidemia     simvastatin   • Menopause     etradiol   • Insomnia     triazolam       HISTORY OF PRESENT ILLNESS: Patient is a 68 y.o. female established patient here today for the following concerns:    1. Primary insomnia  Here for follow up.  Needs refill on Halcion for occasional use.   Typically only uses a couple times per month depending on travel.  Has upcoming trip to [place].      2. Hormone replacement therapy (postmenopausal)  Continues on HRT for post-menopausal symptoms with good tolerability and normal mammogram.      3. Mild intermittent asthma without complication  Reports that she has been feeling pretty well with only 2 typical flares of asthma this year.  Working with Dr. Abdullahi and Dr. Richmond.  Needs refill on the singluair.     4. Facial basal cell cancer  In addition notes a small little \"dimple on an orange peal\" lesion at the margin of a previously resected basal cell s/p Mohs surgery.  She reports that she would like to get established with local dermatologist for possible biopsy and early removal as this is very similar to that previous basal cell.       Past Medical, Social, and Family history reviewed and updated in EPIC    Allergies:Patient has no known allergies.    Current Outpatient Prescriptions   Medication Sig Dispense Refill   • montelukast (SINGULAIR) 10 MG Tab Take 1 Tab by mouth every day. 90 Tab 3   • estradiol-norethindrone (ACTIVELLA) 1-0.5 MG per tablet Take 1 Tab by mouth every day. 90 Tab 3   • simvastatin (ZOCOR) 20 MG Tab Take 1 Tab by mouth every evening. 90 Tab 3   • triazolam (HALCION) 0.125 MG tablet Take 1 Tab by mouth at bedtime as needed for up to 90 days. 90 Tab 1   • aspirin 81 MG tablet Take 81 mg by mouth.     • Fluticasone Furoate-Vilanterol (BREO ELLIPTA) 200-25 MCG/INH AEROSOL POWDER, BREATH ACTIVATED Inhale 1 Puff by mouth every day. Rinse mouth after use. 3 Each 3   • Cholecalciferol " 1000 units Chew Tab Take 5,000 Units by mouth.     • Flaxseed Oil (LINSEED OIL) Oil 1400mg daily     • MULTIPLE VITAMIN PO Take 1 tablet by mouth.     • montelukast (SINGULAIR) 10 MG Tab Take 1 Tab by mouth every day. 90 Tab 3   • Probiotic Product (ALIGN PO) Take  by mouth.     • Nasal Wash (ALKALOL) Solution Spray  in nose.     • simvastatin (ZOCOR) 20 MG Tab TAKE 1 TABLET EVERY EVENING 90 Tab 3   • Estradiol-Norethindrone Acet 0.5-0.1 MG Tab TAKE 1 TABLET DAILY 84 Tab 3   • Naproxen (NAPROSYN PO) Take  by mouth.     • cyclosporin (RESTASIS) 0.05 % ophthalmic emulsion Place 1 Drop in both eyes 2 times a day.     • Cyanocobalamin (VITAMIN B 12 PO) Take  by mouth.     • fluticasone-salmeterol (ADVAIR HFA) 230-21 MCG/ACT inhaler 2 Puffs by Nebulization route.     • omeprazole (PRILOSEC) 40 MG delayed-release capsule Take 40 mg by mouth.     • cyanocobalamin (VITAMIN B-12) 1000 MCG/ML Solution 1,000 mcg by Intramuscular route.     • ibuprofen (MOTRIN) 600 MG Tab Take 600 mg by mouth.     • MethylPREDNISolone (MEDROL DOSEPAK) 4 MG Tablet Therapy Pack Take as directed. (Patient not taking: Reported on 12/5/2018) 21 Tab 0   • ipratropium-albuterol (DUONEB) 0.5-2.5 (3) MG/3ML nebulizer solution 3 mL by Nebulization route every 6 hours as needed for Shortness of Breath. 30 Bullet 4   • albuterol (PROAIR HFA) 108 (90 Base) MCG/ACT Aero Soln inhalation aerosol USE 2 INHALATIONS EVERY 6 HOURS AS NEEDED FOR SHORTNESS OF BREATH 3 Inhaler 3   • pantoprazole (PROTONIX) 40 MG Tablet Delayed Response Take 40 mg by mouth every day.     • acetaminophen (TYLENOL) 325 MG Tab Take 650 mg by mouth.     • predniSONE (DELTASONE) 20 MG Tab      • GuaiFENesin (MUCINEX PO) Take  by mouth.     • ibuprofen (MOTRIN) 600 MG TABS Take 600 mg by mouth every 6 hours as needed.       No current facility-administered medications for this visit.          ROS:  Review of Systems   Constitutional: Negative for fever, chills, weight loss and  "malaise/fatigue.   HENT: Negative for ear pain, nosebleeds, congestion, sore throat and neck pain.    Eyes: Negative for blurred vision.   Respiratory: Negative for cough, sputum production, shortness of breath and wheezing.    Cardiovascular: Negative for chest pain, palpitations,  and leg swelling.   Gastrointestinal: Negative for heartburn, nausea, vomiting, diarrhea and abdominal pain.   Genitourinary: Negative for dysuria, urgency and frequency.   Musculoskeletal: Negative for myalgias, back pain and joint pain.   Skin: Negative for rash and itching.   Neurological: Negative for dizziness, tingling, tremors, sensory change, focal weakness and headaches.   Endo/Heme/Allergies: Does not bruise/bleed easily.   Psychiatric/Behavioral: Negative for depression, anxiety, suicidal ideas, insomnia and memory loss.      Exam:  Blood pressure 122/74, pulse 76, temperature 36.2 °C (97.1 °F), resp. rate 14, height 1.626 m (5' 4.02\"), weight 77.6 kg (171 lb), SpO2 99 %.    General:  Well nourished, well developed in NAD  Head is grossly normal.  Neck: Supple without JVD   Pulmonary:  Normal effort.   Cardiovascular: Regular rate  Extremities: no clubbing, cyanosis, or edema.  Psych: affect appropriate  Small flesh colored slightly raised lesion on the right side of the forehead which may represent early basal cell.     Please note that this dictation was created using voice recognition software. I have made every reasonable attempt to correct obvious errors, but I expect that there are errors of grammar and possibly content that I did not discover before finalizing the note.    Assessment/Plan:  1. Primary insomnia  - triazolam (HALCION) 0.125 MG tablet; Take 1 Tab by mouth at bedtime as needed for up to 90 days.  Dispense: 90 Tab; Refill: 1    2. Hormone replacement therapy (postmenopausal)  Continue HRT with caution re-evaluating each year.  Annual mammogram.     3. Mild intermittent asthma without complication  Continue " singulair, inhaled medications and follow up with both pulmonology and allergy/asthma.     4. Facial basal cell cancer  - REFERRAL TO DERMATOLOGY    Follow up in the next 6-12 months, sooner prn.

## 2019-01-15 ENCOUNTER — OFFICE VISIT (OUTPATIENT)
Dept: MEDICAL GROUP | Facility: PHYSICIAN GROUP | Age: 69
End: 2019-01-15
Payer: MEDICARE

## 2019-01-15 ENCOUNTER — TELEPHONE (OUTPATIENT)
Dept: SCHEDULING | Facility: IMAGING CENTER | Age: 69
End: 2019-01-15

## 2019-01-15 VITALS
TEMPERATURE: 97.4 F | RESPIRATION RATE: 14 BRPM | BODY MASS INDEX: 30.56 KG/M2 | HEART RATE: 92 BPM | SYSTOLIC BLOOD PRESSURE: 140 MMHG | DIASTOLIC BLOOD PRESSURE: 84 MMHG | WEIGHT: 179 LBS | OXYGEN SATURATION: 97 % | HEIGHT: 64 IN

## 2019-01-15 DIAGNOSIS — D64.9 ANEMIA, UNSPECIFIED TYPE: ICD-10-CM

## 2019-01-15 DIAGNOSIS — Z13.6 SCREENING FOR CARDIOVASCULAR CONDITION: ICD-10-CM

## 2019-01-15 DIAGNOSIS — R07.9 CHEST PAIN, UNSPECIFIED TYPE: ICD-10-CM

## 2019-01-15 DIAGNOSIS — M25.512 SUBSCAPULAR PAIN, LEFT: ICD-10-CM

## 2019-01-15 DIAGNOSIS — M54.12 CERVICAL RADICULOPATHY: ICD-10-CM

## 2019-01-15 DIAGNOSIS — Z13.29 SCREENING FOR ENDOCRINE DISORDER: ICD-10-CM

## 2019-01-15 PROCEDURE — 99214 OFFICE O/P EST MOD 30 MIN: CPT | Performed by: FAMILY MEDICINE

## 2019-01-15 PROCEDURE — 93000 ELECTROCARDIOGRAM COMPLETE: CPT | Performed by: FAMILY MEDICINE

## 2019-01-15 RX ORDER — DOXYCYCLINE HYCLATE 100 MG/1
CAPSULE ORAL
COMMUNITY
Start: 2019-01-05 | End: 2019-04-11

## 2019-01-15 RX ORDER — GABAPENTIN 300 MG/1
300 CAPSULE ORAL 2 TIMES DAILY
Qty: 60 CAP | Refills: 1 | Status: SHIPPED | OUTPATIENT
Start: 2019-01-15 | End: 2019-03-15 | Stop reason: SDUPTHER

## 2019-01-15 RX ORDER — OMEPRAZOLE 40 MG/1
40 CAPSULE, DELAYED RELEASE ORAL DAILY
Qty: 90 CAP | Refills: 3 | Status: SHIPPED | OUTPATIENT
Start: 2019-01-15 | End: 2019-11-26 | Stop reason: SDUPTHER

## 2019-01-15 RX ORDER — FAMCICLOVIR 500 MG/1
1 TABLET ORAL 3 TIMES DAILY
COMMUNITY
Start: 2019-01-12 | End: 2019-04-11

## 2019-01-15 RX ORDER — HYDROCODONE BITARTRATE AND ACETAMINOPHEN 5; 325 MG/1; MG/1
1-2 TABLET ORAL EVERY 6 HOURS PRN
COMMUNITY
Start: 2019-01-14 | End: 2019-04-11

## 2019-01-15 ASSESSMENT — PAIN SCALES - GENERAL: PAINLEVEL: 8=MODERATE-SEVERE PAIN

## 2019-01-15 ASSESSMENT — PATIENT HEALTH QUESTIONNAIRE - PHQ9: CLINICAL INTERPRETATION OF PHQ2 SCORE: 0

## 2019-01-15 NOTE — TELEPHONE ENCOUNTER
1. Caller Name: Sharron                                         Call Back Number: [phone]      Patient approves a detailed voicemail message: N\A    2. What are the patient's symptoms (location & severity)? Left upper back, under armpit, patient stated it was a 10 yesterday. Today it is a 7-8    3. Is this a new symptom Yes    4. When did it start? 01/09/18    5. Action taken per Active Symptom Guide: Urgent Care recommended    6. Patient does not agree to recommended action per Active Symptom Escalation Protocol. Patient was advised again of recommendation and verbalized understanding.       Patient was seen at [facility] yesterday.  She stated that she would like to have some labs ordered.  I advised that she would need to be seen.  There was an opening today and patient will be in for an appointment.

## 2019-01-15 NOTE — PROGRESS NOTES
"Chief Complaint   Patient presents with   • Back Pain     x 6 days       HISTORY OF PRESENT ILLNESS: Patient is a 68 y.o. female established patient here today for the following concerns:    1. Subscapular pain, left  2. Chest pain, unspecified type    This is a pleasant 68 year old female with hx of mild-moderate persistent asthma, Hyperlipidemia and HTN who presented 6 days ago to [facility] with sudden onset of subscapular pain and left shoulder pain with radiation down the entire arm associated with some tenderness in the chest and hyperalgesia in the skin.  No associated fevers or chills.  She reports some mild nausea when the \"stabbing\" pain would come on in the back.  No diaphoresis or SOB.  She was just treated with some steroids for bronchitis by her allergist and had been sleeping propped up on several pillows and felt maybe she threw her neck out.  She had imaging of the back and neck.  Neck showed some arthritis and the back was \"normal\" per Mrs. Patiño.  She reports continued pain although it is less.  Seems to come and go.  She was given hydrocodone for it with some mild improvement.  Her  prescribed some antivirals in the case of shingles.  No rash has developed.  She denies any leg swelling or pain.   Respiratory symptoms are on the mend and has follow up MRI of the neck next week.         Past Medical, Social, and Family history reviewed and updated in EPIC    Allergies:Patient has no known allergies.    Current Outpatient Prescriptions   Medication Sig Dispense Refill   • HYDROcodone-acetaminophen (NORCO) 5-325 MG Tab per tablet Take 1-2 Tabs by mouth every 6 hours as needed.     • famciclovir (FAMVIR) 500 MG Tab Take 1 Tab by mouth 3 times a day.     • montelukast (SINGULAIR) 10 MG Tab Take 1 Tab by mouth every day. 90 Tab 3   • simvastatin (ZOCOR) 20 MG Tab Take 1 Tab by mouth every evening. 90 Tab 3   • omeprazole (PRILOSEC) 40 MG delayed-release capsule Take 40 mg by mouth.     • " aspirin 81 MG tablet Take 81 mg by mouth.     • Fluticasone Furoate-Vilanterol (BREO ELLIPTA) 200-25 MCG/INH AEROSOL POWDER, BREATH ACTIVATED Inhale 1 Puff by mouth every day. Rinse mouth after use. 3 Each 3   • Probiotic Product (ALIGN PO) Take  by mouth.     • Nasal Wash (ALKALOL) Solution Spray  in nose.     • Estradiol-Norethindrone Acet 0.5-0.1 MG Tab TAKE 1 TABLET DAILY 84 Tab 3   • cyclosporin (RESTASIS) 0.05 % ophthalmic emulsion Place 1 Drop in both eyes 2 times a day.     • doxycycline (VIBRAMYCIN) 100 MG Cap      • estradiol-norethindrone (ACTIVELLA) 1-0.5 MG per tablet Take 1 Tab by mouth every day. (Patient not taking: Reported on 1/15/2019) 90 Tab 3   • triazolam (HALCION) 0.125 MG tablet Take 1 Tab by mouth at bedtime as needed for up to 90 days. 90 Tab 1   • fluticasone-salmeterol (ADVAIR HFA) 230-21 MCG/ACT inhaler 2 Puffs by Nebulization route.     • cyanocobalamin (VITAMIN B-12) 1000 MCG/ML Solution 1,000 mcg by Intramuscular route.     • ibuprofen (MOTRIN) 600 MG Tab Take 600 mg by mouth.     • MethylPREDNISolone (MEDROL DOSEPAK) 4 MG Tablet Therapy Pack Take as directed. (Patient not taking: Reported on 12/5/2018) 21 Tab 0   • ipratropium-albuterol (DUONEB) 0.5-2.5 (3) MG/3ML nebulizer solution 3 mL by Nebulization route every 6 hours as needed for Shortness of Breath. 30 Bullet 4   • albuterol (PROAIR HFA) 108 (90 Base) MCG/ACT Aero Soln inhalation aerosol USE 2 INHALATIONS EVERY 6 HOURS AS NEEDED FOR SHORTNESS OF BREATH 3 Inhaler 3   • acetaminophen (TYLENOL) 325 MG Tab Take 650 mg by mouth.     • Cholecalciferol 1000 units Chew Tab Take 5,000 Units by mouth.     • Flaxseed Oil (LINSEED OIL) Oil 1400mg daily     • MULTIPLE VITAMIN PO Take 1 tablet by mouth.     • montelukast (SINGULAIR) 10 MG Tab Take 1 Tab by mouth every day. 90 Tab 3   • GuaiFENesin (MUCINEX PO) Take  by mouth.     • Naproxen (NAPROSYN PO) Take  by mouth.     • ibuprofen (MOTRIN) 600 MG TABS Take 600 mg by mouth every 6  "hours as needed.     • Cyanocobalamin (VITAMIN B 12 PO) Take  by mouth.       No current facility-administered medications for this visit.          ROS:  Review of Systems   Constitutional: Negative for fever, chills, weight loss and malaise/fatigue.   HENT: Negative for ear pain, nosebleeds, congestion, sore throat and neck pain.    Eyes: Negative for blurred vision.   Respiratory: Negative for cough, sputum production, shortness of breath and wheezing.    Cardiovascular: Negative for chest pain, palpitations,  and leg swelling.   Gastrointestinal: Negative for heartburn, nausea, vomiting, diarrhea and abdominal pain.   Genitourinary: Negative for dysuria, urgency and frequency.   Musculoskeletal: Negative for myalgias, back pain and joint pain.   Skin: Negative for rash and itching.   Neurological: Negative for dizziness, tingling, tremors, sensory change, focal weakness and headaches.   Endo/Heme/Allergies: Does not bruise/bleed easily.   Psychiatric/Behavioral: Negative for depression, anxiety, suicidal ideas, insomnia and memory loss.      Exam:  Blood pressure 140/84, pulse 92, temperature 36.3 °C (97.4 °F), resp. rate 14, height 1.626 m (5' 4.02\"), weight 81.2 kg (179 lb), SpO2 97 %.    General:  Well nourished, well developed in NAD  Head is grossly normal.  Neck: Supple without JVD  Pulmonary:  Normal effort. CTAB no w/r/c   Cardiovascular: Regular rate and rhythm no m/r/g  Extremities: no clubbing, cyanosis, or edema.  Psych: affect appropriate      Please note that this dictation was created using voice recognition software. I have made every reasonable attempt to correct obvious errors, but I expect that there are errors of grammar and possibly content that I did not discover before finalizing the note.    Assessment/Plan:  1. Subscapular pain, left  EKG Interpretation-HR is NSR no ST or T wave changes.      - EKG - Clinic Performed    2. Chest pain, unspecified type  - EKG - Clinic Performed    No " Follow-up on file.

## 2019-01-17 ENCOUNTER — HOSPITAL ENCOUNTER (OUTPATIENT)
Dept: LAB | Facility: MEDICAL CENTER | Age: 69
End: 2019-01-17
Attending: FAMILY MEDICINE
Payer: MEDICARE

## 2019-01-17 DIAGNOSIS — D64.9 ANEMIA, UNSPECIFIED TYPE: ICD-10-CM

## 2019-01-17 DIAGNOSIS — Z13.29 SCREENING FOR ENDOCRINE DISORDER: ICD-10-CM

## 2019-01-17 DIAGNOSIS — Z13.6 SCREENING FOR CARDIOVASCULAR CONDITION: ICD-10-CM

## 2019-01-17 LAB
ALBUMIN SERPL BCP-MCNC: 4.2 G/DL (ref 3.2–4.9)
ALBUMIN/GLOB SERPL: 1.6 G/DL
ALP SERPL-CCNC: 56 U/L (ref 30–99)
ALT SERPL-CCNC: 27 U/L (ref 2–50)
ANION GAP SERPL CALC-SCNC: 7 MMOL/L (ref 0–11.9)
AST SERPL-CCNC: 26 U/L (ref 12–45)
BASOPHILS # BLD AUTO: 0.6 % (ref 0–1.8)
BASOPHILS # BLD: 0.03 K/UL (ref 0–0.12)
BILIRUB SERPL-MCNC: 1.1 MG/DL (ref 0.1–1.5)
BUN SERPL-MCNC: 13 MG/DL (ref 8–22)
CALCIUM SERPL-MCNC: 9.7 MG/DL (ref 8.5–10.5)
CHLORIDE SERPL-SCNC: 105 MMOL/L (ref 96–112)
CHOLEST SERPL-MCNC: 180 MG/DL (ref 100–199)
CO2 SERPL-SCNC: 29 MMOL/L (ref 20–33)
CREAT SERPL-MCNC: 0.81 MG/DL (ref 0.5–1.4)
EOSINOPHIL # BLD AUTO: 0.07 K/UL (ref 0–0.51)
EOSINOPHIL NFR BLD: 1.5 % (ref 0–6.9)
ERYTHROCYTE [DISTWIDTH] IN BLOOD BY AUTOMATED COUNT: 41 FL (ref 35.9–50)
FASTING STATUS PATIENT QL REPORTED: NORMAL
GLOBULIN SER CALC-MCNC: 2.6 G/DL (ref 1.9–3.5)
GLUCOSE SERPL-MCNC: 91 MG/DL (ref 65–99)
HCT VFR BLD AUTO: 35.6 % (ref 37–47)
HDLC SERPL-MCNC: 67 MG/DL
HGB BLD-MCNC: 11.1 G/DL (ref 12–16)
IMM GRANULOCYTES # BLD AUTO: 0.01 K/UL (ref 0–0.11)
IMM GRANULOCYTES NFR BLD AUTO: 0.2 % (ref 0–0.9)
LDLC SERPL CALC-MCNC: 94 MG/DL
LYMPHOCYTES # BLD AUTO: 1.55 K/UL (ref 1–4.8)
LYMPHOCYTES NFR BLD: 32.4 % (ref 22–41)
MCH RBC QN AUTO: 20.9 PG (ref 27–33)
MCHC RBC AUTO-ENTMCNC: 31.2 G/DL (ref 33.6–35)
MCV RBC AUTO: 67 FL (ref 81.4–97.8)
MONOCYTES # BLD AUTO: 0.28 K/UL (ref 0–0.85)
MONOCYTES NFR BLD AUTO: 5.9 % (ref 0–13.4)
NEUTROPHILS # BLD AUTO: 2.84 K/UL (ref 2–7.15)
NEUTROPHILS NFR BLD: 59.4 % (ref 44–72)
NRBC # BLD AUTO: 0 K/UL
NRBC BLD-RTO: 0 /100 WBC
PLATELET # BLD AUTO: 372 K/UL (ref 164–446)
PMV BLD AUTO: 11.4 FL (ref 9–12.9)
POTASSIUM SERPL-SCNC: 4.2 MMOL/L (ref 3.6–5.5)
PROT SERPL-MCNC: 6.8 G/DL (ref 6–8.2)
RBC # BLD AUTO: 5.31 M/UL (ref 4.2–5.4)
SODIUM SERPL-SCNC: 141 MMOL/L (ref 135–145)
TRIGL SERPL-MCNC: 96 MG/DL (ref 0–149)
TSH SERPL DL<=0.005 MIU/L-ACNC: 2.45 UIU/ML (ref 0.38–5.33)
WBC # BLD AUTO: 4.8 K/UL (ref 4.8–10.8)

## 2019-01-17 PROCEDURE — 84443 ASSAY THYROID STIM HORMONE: CPT

## 2019-01-17 PROCEDURE — 80053 COMPREHEN METABOLIC PANEL: CPT

## 2019-01-17 PROCEDURE — 80061 LIPID PANEL: CPT

## 2019-01-17 PROCEDURE — 36415 COLL VENOUS BLD VENIPUNCTURE: CPT

## 2019-01-17 PROCEDURE — 85025 COMPLETE CBC W/AUTO DIFF WBC: CPT

## 2019-01-18 ENCOUNTER — OFFICE VISIT (OUTPATIENT)
Dept: PULMONOLOGY | Facility: HOSPICE | Age: 69
End: 2019-01-18
Payer: MEDICARE

## 2019-01-18 VITALS
HEIGHT: 64 IN | TEMPERATURE: 98.2 F | HEART RATE: 74 BPM | DIASTOLIC BLOOD PRESSURE: 90 MMHG | WEIGHT: 174 LBS | RESPIRATION RATE: 16 BRPM | SYSTOLIC BLOOD PRESSURE: 130 MMHG | BODY MASS INDEX: 29.71 KG/M2 | OXYGEN SATURATION: 98 %

## 2019-01-18 DIAGNOSIS — J45.20 MILD INTERMITTENT ASTHMA WITHOUT COMPLICATION: ICD-10-CM

## 2019-01-18 DIAGNOSIS — J20.9 ACUTE BRONCHITIS, UNSPECIFIED ORGANISM: ICD-10-CM

## 2019-01-18 PROCEDURE — 99215 OFFICE O/P EST HI 40 MIN: CPT | Performed by: INTERNAL MEDICINE

## 2019-01-18 RX ORDER — DIAZEPAM 5 MG/1
TABLET ORAL
COMMUNITY
Start: 2019-01-17 | End: 2019-11-26 | Stop reason: SDUPTHER

## 2019-01-18 RX ORDER — MONTELUKAST SODIUM 10 MG/1
10 TABLET ORAL DAILY
Qty: 90 TAB | Refills: 3 | Status: SHIPPED | OUTPATIENT
Start: 2019-01-18 | End: 2019-01-18 | Stop reason: SDUPTHER

## 2019-01-18 RX ORDER — AZITHROMYCIN 250 MG/1
TABLET, FILM COATED ORAL
Qty: 6 TAB | Refills: 2 | Status: SHIPPED | OUTPATIENT
Start: 2019-01-18 | End: 2019-04-11

## 2019-01-18 RX ORDER — ALBUTEROL SULFATE 90 UG/1
AEROSOL, METERED RESPIRATORY (INHALATION)
Qty: 3 INHALER | Refills: 3 | Status: SHIPPED | OUTPATIENT
Start: 2019-01-18 | End: 2020-01-20

## 2019-01-18 RX ORDER — MONTELUKAST SODIUM 10 MG/1
10 TABLET ORAL DAILY
Qty: 90 TAB | Refills: 3 | Status: SHIPPED | OUTPATIENT
Start: 2019-01-18 | End: 2020-01-20

## 2019-01-18 RX ORDER — METHYLPREDNISOLONE 4 MG/1
TABLET ORAL
Qty: 21 TAB | Refills: 2 | Status: SHIPPED | OUTPATIENT
Start: 2019-01-18 | End: 2019-04-11

## 2019-01-18 RX ORDER — ALBUTEROL SULFATE 90 UG/1
AEROSOL, METERED RESPIRATORY (INHALATION)
Qty: 3 INHALER | Refills: 3 | Status: SHIPPED | OUTPATIENT
Start: 2019-01-18 | End: 2019-01-18 | Stop reason: SDUPTHER

## 2019-01-18 ASSESSMENT — PAIN SCALES - GENERAL: PAINLEVEL: 4=SLIGHT-MODERATE PAIN

## 2019-01-18 NOTE — PATIENT INSTRUCTIONS
This lady comes in with a remarkable story that began last November actually in August when she developed a bronchitis, seem to clear and then in December reoccurred a little more severe.  She required Medrol and Z-Lalo.  She then got better but symptoms flared again and she saw her allergist, a recent course of prednisone has put her back to her baseline state.  At present she is using Brio and albuterol and off the prednisone.  Interval history includes back discomfort, and she saw orthopedics who placed her on hydrocodone, that affected her breathing.  She then had an EKG by her primary care doctor that was normal, and then empiric treatment for shingles.  She is now on gabapentin and is going to physical therapy.  Peak flows were 430-300 when she was sick, now back to her baseline state with clear lungs today.    If this flares in the future, with regard to purulent bronchitis I would again use the steroid Dosepak and an antibiotic but hopefully she is out of the woods

## 2019-01-18 NOTE — PROGRESS NOTES
Jacinda Patiño is a 68 y.o. female here for recent asthmatic bronchitis requiring steroids and antibiotic. Patient was referred by her primary care doctor.    History of Present Illness:      This lady comes in with a remarkable story that began last November actually in August when she developed a bronchitis, seem to clear and then in December reoccurred a little more severe.  She required Medrol and Z-Lalo.  She then got better but symptoms flared again and she saw her allergist, a recent course of prednisone has put her back to her baseline state.  At present she is using Brio and albuterol and off the prednisone.  Interval history includes back discomfort, and she saw orthopedics who placed her on hydrocodone, that affected her breathing.  She then had an EKG by her primary care doctor that was normal, and then empiric treatment for shingles.  She is now on gabapentin and is going to physical therapy.  Peak flows were 430-300 when she was sick, now back to her baseline state with clear lungs today.    If this flares in the future, with regard to purulent bronchitis I would again use the steroid Dosepak and an antibiotic but hopefully she is out of the woods    Constitutional ROS: No unexpected change in weight, No unexplained fevers  Eyes: No change in vision or blurring or double vision  Mouth/Throat ROS: No sore throat, No recent change in voice or hoarseness  Pulmonary ROS: See present history for pertinent positives  Cardiovascular ROS: No chest pain to suggest acute coronary syndrome  Gastrointestinal ROS: No abdominal pain to suggest peptic disease  Musculoskeletal/Extremities ROS: no acute artritis or unusual swelling  Hematologic/Lymphatic ROS: No easy bleeding or unusual lymph node swelling  Neurologic ROS: No new or unusual weakness  Psychiatric ROS: No hallucinations  Allergic/Immunologic: No  urticaria or allergic rash      Current Outpatient Prescriptions   Medication Sig Dispense Refill   •  Lifitegrast (XIIDRA) 5 % Solution by Ophthalmic route.     • famciclovir (FAMVIR) 500 MG Tab Take 1 Tab by mouth 3 times a day.     • omeprazole (PRILOSEC) 40 MG delayed-release capsule Take 1 Cap by mouth every day. 90 Cap 3   • montelukast (SINGULAIR) 10 MG Tab Take 1 Tab by mouth every day. 90 Tab 3   • simvastatin (ZOCOR) 20 MG Tab Take 1 Tab by mouth every evening. 90 Tab 3   • aspirin 81 MG tablet Take 81 mg by mouth.     • Fluticasone Furoate-Vilanterol (BREO ELLIPTA) 200-25 MCG/INH AEROSOL POWDER, BREATH ACTIVATED Inhale 1 Puff by mouth every day. Rinse mouth after use. 3 Each 3   • Probiotic Product (ALIGN PO) Take  by mouth.     • Estradiol-Norethindrone Acet 0.5-0.1 MG Tab TAKE 1 TABLET DAILY 84 Tab 3   • cyclosporin (RESTASIS) 0.05 % ophthalmic emulsion Place 1 Drop in both eyes 2 times a day.     • diazePAM (VALIUM) 5 MG Tab      • HYDROcodone-acetaminophen (NORCO) 5-325 MG Tab per tablet Take 1-2 Tabs by mouth every 6 hours as needed.     • doxycycline (VIBRAMYCIN) 100 MG Cap      • gabapentin (NEURONTIN) 300 MG Cap Take 1 Cap by mouth 2 Times a Day. 60 Cap 1   • estradiol-norethindrone (ACTIVELLA) 1-0.5 MG per tablet Take 1 Tab by mouth every day. 90 Tab 3   • triazolam (HALCION) 0.125 MG tablet Take 1 Tab by mouth at bedtime as needed for up to 90 days. 90 Tab 1   • cyanocobalamin (VITAMIN B-12) 1000 MCG/ML Solution 1,000 mcg by Intramuscular route.     • ipratropium-albuterol (DUONEB) 0.5-2.5 (3) MG/3ML nebulizer solution 3 mL by Nebulization route every 6 hours as needed for Shortness of Breath. 30 Bullet 4   • albuterol (PROAIR HFA) 108 (90 Base) MCG/ACT Aero Soln inhalation aerosol USE 2 INHALATIONS EVERY 6 HOURS AS NEEDED FOR SHORTNESS OF BREATH 3 Inhaler 3   • acetaminophen (TYLENOL) 325 MG Tab Take 650 mg by mouth.     • Cholecalciferol 1000 units Chew Tab Take 5,000 Units by mouth.     • Flaxseed Oil (LINSEED OIL) Oil 1400mg daily     • MULTIPLE VITAMIN PO Take 1 tablet by mouth.     • Nasal  "Wash (ALKALOL) Solution Spray  in nose.     • GuaiFENesin (MUCINEX PO) Take  by mouth.     • Naproxen (NAPROSYN PO) Take  by mouth.     • ibuprofen (MOTRIN) 600 MG TABS Take 600 mg by mouth every 6 hours as needed.     • Cyanocobalamin (VITAMIN B 12 PO) Take  by mouth.       No current facility-administered medications for this visit.        Social History   Substance Use Topics   • Smoking status: Never Smoker   • Smokeless tobacco: Never Used   • Alcohol use No        Past Medical History:   Diagnosis Date   • HLD (hyperlipidemia) 7/7/2015   • Mild intermittent asthma without complication 7/19/2016       Past Surgical History:   Procedure Laterality Date   • KNEE ARTHROSCOPY Right 2015   • LUMPECTOMY Left 4/2012   • BASAL CELL EXCISION  2010    Forehead    • BLEPHAROPLASTY  2010   • SHOULDER ARTHROSCOPY  1992   • HERNIA REPAIR  1990    inguinal    • LAPAROSCOPY  1984    AGA   • PB REMV 2ND CATARACT,CORN-SCLER SECTN         Allergies: Patient has no known allergies.    Family History   Problem Relation Age of Onset   • Heart Attack Mother    • Heart Disease Mother    • Hyperlipidemia Mother    • Hypertension Mother    • Stroke Mother    • Heart Attack Maternal Aunt        Physical Examination    Vitals:    01/18/19 1030   Height: 1.626 m (5' 4.02\")   Weight: 78.9 kg (174 lb)   Weight % change since last entry.: 0 %   BP: 130/90   Pulse: 74   BMI (Calculated): 29.85   Resp: 16   Temp: 36.8 °C (98.2 °F)   TempSrc: Oral       General Appearance: alert, no distress  Skin: Skin color, texture, turgor normal. No rashes or lesions.  Eyes: negative  Oropharynx: Lips, mucosa, and tongue normal. Teeth and gums normal. Oropharynx moist and without lesion  Lungs: positive findings: Remarkably quiet and clear without wheezes or prolonged phases  Heart: negative. RRR without murmur, gallop, or rubs.  No ectopy.  Abdomen: Abdomen soft, non-tender. . No masses,  No organomegaly  Extremities:  No deformities, edema, or skin " discoloration  Joints: No acute arthritis  Peripheral Pulses:perfused  Neurologic: intact grossly  No oral thrush    I (soft palate, uvula, fauces, tonsillar pillars visible)    Imaging: None presently    PFTS: None presently      Assessment and Plan  1. Mild intermittent asthma without complication  Recent exacerbations requiring intense therapy but now back to baseline state    2. Acute bronchitis, unspecified organism  Recent antibiotics, now resolved    3.  Elevated body mass index, portion control and gentle exercise advised.  Patient's body mass index is 29.85 kg/m². Exercise and nutrition counseling were performed at this visit.    Herlinda coulter comes in with a remarkable story that began last November actually in August when she developed a bronchitis, seem to clear and then in December reoccurred a little more severe.  She required Medrol and Z-Lalo.  She then got better but symptoms flared again and she saw her allergist, a recent course of prednisone has put her back to her baseline state.  At present she is using Brio and albuterol and off the prednisone.  Interval history includes back discomfort, and she saw orthopedics who placed her on hydrocodone, that affected her breathing.  She then had an EKG by her primary care doctor that was normal, and then empiric treatment for shingles.  She is now on gabapentin and is going to physical therapy.  Peak flows were 430-300 when she was sick, now back to her baseline state with clear lungs today.    If this flares in the future, with regard to purulent bronchitis I would again use the steroid Dosepak and an antibiotic but hopefully she is out of the woodsowup Return in about 6 months (around 7/18/2019) for follow up visit with Dr. Elizabeth Abdullahi.

## 2019-02-19 ENCOUNTER — TELEPHONE (OUTPATIENT)
Dept: MEDICAL GROUP | Facility: PHYSICIAN GROUP | Age: 69
End: 2019-02-19

## 2019-02-19 RX ORDER — ESTRADIOL AND NORETHINDRONE ACETATE .5; .1 MG/1; MG/1
TABLET ORAL
Qty: 84 TAB | Refills: 3 | Status: SHIPPED | OUTPATIENT
Start: 2019-02-19 | End: 2019-11-26 | Stop reason: SDUPTHER

## 2019-02-19 RX ORDER — ESTRADIOL AND NORETHINDRONE ACETATE .5; .1 MG/1; MG/1
TABLET ORAL
Qty: 84 TAB | Refills: 3 | Status: SHIPPED | OUTPATIENT
Start: 2019-02-19 | End: 2019-02-19 | Stop reason: SDUPTHER

## 2019-02-19 NOTE — TELEPHONE ENCOUNTER
"Pt sent a [employer] message and called about it. Please advise. (Im forwarding the message below)    \"Apparently the wrong strength was submitted for the Estrodial/Noreth tabs prescription   sent to my [facility] in Dec. '18.  I just received a 3 month auto-refill for   28's 1/0.5 mg which should have been for 28's 0.5/0.1.  I have 8 days left of the correct size so would it be possible to send a new prescription ASAP?  I spoke with a pharmacist at [facility] and they are cancelling the previous prescription and will wait for the new one.  Thank you\"  "

## 2019-02-19 NOTE — TELEPHONE ENCOUNTER
Ok correct Rx was sent today. One sent to [facility] just incase mail order cannot get it out in time.  [facility]

## 2019-03-15 DIAGNOSIS — M54.12 CERVICAL RADICULOPATHY: ICD-10-CM

## 2019-03-15 DIAGNOSIS — M25.512 SUBSCAPULAR PAIN, LEFT: ICD-10-CM

## 2019-03-15 NOTE — TELEPHONE ENCOUNTER
----- Message from Jacinda Patiño sent at 3/15/2019  8:19 AM PDT -----  Regarding: Prescription Question  Contact: [phone]  Dear Dr. Walton,  So sorry you are ill!  May I have a refill of the Gabapentin 300 Mg Cap (([facility]) until I see you and discuss tapering off.  Thank you.  Wishing you a quick recovery.

## 2019-03-19 RX ORDER — GABAPENTIN 300 MG/1
300 CAPSULE ORAL 2 TIMES DAILY
Qty: 60 CAP | Refills: 1 | Status: SHIPPED | OUTPATIENT
Start: 2019-03-19 | End: 2019-04-11

## 2019-04-11 ENCOUNTER — OFFICE VISIT (OUTPATIENT)
Dept: MEDICAL GROUP | Facility: PHYSICIAN GROUP | Age: 69
End: 2019-04-11
Payer: MEDICARE

## 2019-04-11 VITALS
OXYGEN SATURATION: 98 % | BODY MASS INDEX: 30.39 KG/M2 | SYSTOLIC BLOOD PRESSURE: 122 MMHG | HEIGHT: 64 IN | HEART RATE: 80 BPM | TEMPERATURE: 97.9 F | RESPIRATION RATE: 14 BRPM | WEIGHT: 178 LBS | DIASTOLIC BLOOD PRESSURE: 70 MMHG

## 2019-04-11 DIAGNOSIS — Z23 NEED FOR VACCINATION: ICD-10-CM

## 2019-04-11 DIAGNOSIS — D50.9 MICROCYTIC ANEMIA: ICD-10-CM

## 2019-04-11 DIAGNOSIS — M25.512 ACUTE PAIN OF LEFT SHOULDER: ICD-10-CM

## 2019-04-11 DIAGNOSIS — M54.12 CERVICAL RADICULOPATHY: ICD-10-CM

## 2019-04-11 DIAGNOSIS — Z13.29 SCREENING FOR ENDOCRINE DISORDER: ICD-10-CM

## 2019-04-11 DIAGNOSIS — E53.8 B12 DEFICIENCY: ICD-10-CM

## 2019-04-11 DIAGNOSIS — E55.9 VITAMIN D DEFICIENCY: ICD-10-CM

## 2019-04-11 PROCEDURE — 99214 OFFICE O/P EST MOD 30 MIN: CPT | Mod: 25 | Performed by: FAMILY MEDICINE

## 2019-04-11 PROCEDURE — 90471 IMMUNIZATION ADMIN: CPT | Performed by: FAMILY MEDICINE

## 2019-04-11 PROCEDURE — 90715 TDAP VACCINE 7 YRS/> IM: CPT | Performed by: FAMILY MEDICINE

## 2019-04-11 NOTE — PROGRESS NOTES
Chief Complaint   Patient presents with   • Back Pain     fv gabapentin       HISTORY OF PRESENT ILLNESS: Patient is a 69 y.o. female established patient here today for the following concerns:    1. Need for vaccination  Due for Tdap vaccination today.     2. Cervical radiculopathy  3. Acute pain of left shoulder  Had cervical epidural with great relief of her shoulder pain for the most part.  She is now able to raise her arm and drive.  She did wean down on the gabapentin and is only taking it at night.  She would like to stop it and see how she does.  She did start with PT on the left shoulder as there is some suspicion of arthritis and possible rotator cuff tendonitis.  She reports overall tremendous improvement.     4. Screening for endocrine disorder  5. Vitamin D deficiency  6. B12 deficiency  7. Microcytic anemia  Hx of thalassemia causing microcytic anemia.  Thalassemia minor.  She did have B12 deficiency.  Due for recheck on B12 and Vitamin D.  Taking supplementation for both.        Past Medical, Social, and Family history reviewed and updated in EPIC    Allergies:Patient has no known allergies.    Current Outpatient Prescriptions   Medication Sig Dispense Refill   • Estradiol-Norethindrone Acet 0.5-0.1 MG Tab TAKE 1 TABLET DAILY 84 Tab 3   • Lifitegrast (XIIDRA) 5 % Solution by Ophthalmic route.     • Fluticasone Furoate-Vilanterol (BREO ELLIPTA) 200-25 MCG/INH AEROSOL POWDER, BREATH ACTIVATED Inhale 1 Puff by mouth every day. Rinse mouth after use. 3 Each 3   • montelukast (SINGULAIR) 10 MG Tab Take 1 Tab by mouth every day. 90 Tab 3   • omeprazole (PRILOSEC) 40 MG delayed-release capsule Take 1 Cap by mouth every day. 90 Cap 3   • simvastatin (ZOCOR) 20 MG Tab Take 1 Tab by mouth every evening. 90 Tab 3   • aspirin 81 MG tablet Take 81 mg by mouth.     • Cholecalciferol 1000 units Chew Tab Take 5,000 Units by mouth.     • Flaxseed Oil (LINSEED OIL) Oil 1400mg daily     • Probiotic Product (ALIGN PO)  "Take  by mouth.     • Nasal Wash (ALKALOL) Solution Spray  in nose.     • Naproxen (NAPROSYN PO) Take  by mouth.     • Cyanocobalamin (VITAMIN B 12 PO) Take  by mouth.     • diazePAM (VALIUM) 5 MG Tab      • albuterol (PROAIR HFA) 108 (90 Base) MCG/ACT Aero Soln inhalation aerosol USE 2 INHALATIONS EVERY 6 HOURS AS NEEDED FOR SHORTNESS OF BREATH 3 Inhaler 3   • ipratropium-albuterol (DUONEB) 0.5-2.5 (3) MG/3ML nebulizer solution 3 mL by Nebulization route every 6 hours as needed for Shortness of Breath. 30 Bullet 4   • acetaminophen (TYLENOL) 325 MG Tab Take 650 mg by mouth.     • GuaiFENesin (MUCINEX PO) Take  by mouth.     • ibuprofen (MOTRIN) 600 MG TABS Take 600 mg by mouth every 6 hours as needed.       No current facility-administered medications for this visit.          ROS:  Review of Systems   Constitutional: Negative for fever, chills, weight loss and malaise/fatigue.   HENT: Negative for ear pain, nosebleeds, congestion, sore throat and neck pain.    Eyes: Negative for blurred vision.   Respiratory: Negative for cough, sputum production, shortness of breath and wheezing.    Cardiovascular: Negative for chest pain, palpitations,  and leg swelling.   Gastrointestinal: Negative for heartburn, nausea, vomiting, diarrhea and abdominal pain.   Genitourinary: Negative for dysuria, urgency and frequency.   Musculoskeletal: Negative for myalgias, back pain and joint pain.   Skin: Negative for rash and itching.   Neurological: Negative for dizziness, tingling, tremors, sensory change, focal weakness and headaches.   Endo/Heme/Allergies: Does not bruise/bleed easily.   Psychiatric/Behavioral: Negative for depression, anxiety, suicidal ideas, insomnia and memory loss.      Exam:  /70   Pulse 80   Temp 36.6 °C (97.9 °F)   Resp 14   Ht 1.626 m (5' 4.02\")   Wt 80.7 kg (178 lb)   SpO2 98%     General:  Well nourished, well developed in NAD  Head is grossly normal.  Neck: Supple without JVD   Pulmonary:  " Normal effort.   Cardiovascular: Regular rate  Extremities: no clubbing, cyanosis, or edema.  Psych: affect appropriate      Please note that this dictation was created using voice recognition software. I have made every reasonable attempt to correct obvious errors, but I expect that there are errors of grammar and possibly content that I did not discover before finalizing the note.    Assessment/Plan:  1. Need for vaccination  - TDAP VACCINE =>6YO IM    2. Cervical radiculopathy  Resolving.  May stop gabapentin, if symptoms recur she will resume the gabapentin and let use know.      3. Acute pain of left shoulder  Continue PT.     4. Screening for endocrine disorder  Check   - VITAMIN B12; Future  - VITAMIN D,25 HYDROXY; Future    5. Vitamin D deficiency  Check   - VITAMIN D,25 HYDROXY; Future  Continue 2000 IU per day    6. B12 deficiency  Check   - VITAMIN B12; Future  - CBC WITH DIFFERENTIAL; Future  Continue sublingual replacement.     7. Microcytic anemia  Due to thalassemia  recheck  - VITAMIN B12; Future    No Follow-up on file.

## 2019-04-16 ENCOUNTER — HOSPITAL ENCOUNTER (OUTPATIENT)
Dept: LAB | Facility: MEDICAL CENTER | Age: 69
End: 2019-04-16
Attending: FAMILY MEDICINE
Payer: MEDICARE

## 2019-04-16 DIAGNOSIS — E53.8 B12 DEFICIENCY: ICD-10-CM

## 2019-04-16 DIAGNOSIS — E55.9 VITAMIN D DEFICIENCY: ICD-10-CM

## 2019-04-16 DIAGNOSIS — D50.9 MICROCYTIC ANEMIA: ICD-10-CM

## 2019-04-16 DIAGNOSIS — Z13.29 SCREENING FOR ENDOCRINE DISORDER: ICD-10-CM

## 2019-04-16 LAB
25(OH)D3 SERPL-MCNC: 17 NG/ML (ref 30–100)
BASOPHILS # BLD AUTO: 0.8 % (ref 0–1.8)
BASOPHILS # BLD: 0.06 K/UL (ref 0–0.12)
EOSINOPHIL # BLD AUTO: 0.32 K/UL (ref 0–0.51)
EOSINOPHIL NFR BLD: 4.3 % (ref 0–6.9)
ERYTHROCYTE [DISTWIDTH] IN BLOOD BY AUTOMATED COUNT: 39 FL (ref 35.9–50)
HCT VFR BLD AUTO: 36.1 % (ref 37–47)
HGB BLD-MCNC: 11.2 G/DL (ref 12–16)
IMM GRANULOCYTES # BLD AUTO: 0.04 K/UL (ref 0–0.11)
IMM GRANULOCYTES NFR BLD AUTO: 0.5 % (ref 0–0.9)
LYMPHOCYTES # BLD AUTO: 1.83 K/UL (ref 1–4.8)
LYMPHOCYTES NFR BLD: 24.4 % (ref 22–41)
MCH RBC QN AUTO: 21.3 PG (ref 27–33)
MCHC RBC AUTO-ENTMCNC: 31 G/DL (ref 33.6–35)
MCV RBC AUTO: 68.8 FL (ref 81.4–97.8)
MONOCYTES # BLD AUTO: 0.44 K/UL (ref 0–0.85)
MONOCYTES NFR BLD AUTO: 5.9 % (ref 0–13.4)
NEUTROPHILS # BLD AUTO: 4.8 K/UL (ref 2–7.15)
NEUTROPHILS NFR BLD: 64.1 % (ref 44–72)
NRBC # BLD AUTO: 0 K/UL
NRBC BLD-RTO: 0 /100 WBC
PLATELET # BLD AUTO: 326 K/UL (ref 164–446)
PMV BLD AUTO: 11.5 FL (ref 9–12.9)
RBC # BLD AUTO: 5.25 M/UL (ref 4.2–5.4)
VIT B12 SERPL-MCNC: 242 PG/ML (ref 211–911)
WBC # BLD AUTO: 7.5 K/UL (ref 4.8–10.8)

## 2019-04-16 PROCEDURE — 82306 VITAMIN D 25 HYDROXY: CPT

## 2019-04-16 PROCEDURE — 85025 COMPLETE CBC W/AUTO DIFF WBC: CPT

## 2019-04-16 PROCEDURE — 82607 VITAMIN B-12: CPT

## 2019-04-16 PROCEDURE — 36415 COLL VENOUS BLD VENIPUNCTURE: CPT

## 2019-04-19 ENCOUNTER — APPOINTMENT (RX ONLY)
Dept: URBAN - METROPOLITAN AREA CLINIC 20 | Facility: CLINIC | Age: 69
Setting detail: DERMATOLOGY
End: 2019-04-19

## 2019-04-19 DIAGNOSIS — Z85.828 PERSONAL HISTORY OF OTHER MALIGNANT NEOPLASM OF SKIN: ICD-10-CM

## 2019-04-19 DIAGNOSIS — L73.8 OTHER SPECIFIED FOLLICULAR DISORDERS: ICD-10-CM

## 2019-04-19 DIAGNOSIS — D69.2 OTHER NONTHROMBOCYTOPENIC PURPURA: ICD-10-CM

## 2019-04-19 PROCEDURE — 99202 OFFICE O/P NEW SF 15 MIN: CPT

## 2019-04-19 PROCEDURE — ? COUNSELING

## 2019-04-19 PROCEDURE — ? ADDITIONAL NOTES

## 2019-04-19 ASSESSMENT — LOCATION ZONE DERM
LOCATION ZONE: ARM
LOCATION ZONE: FACE

## 2019-04-19 ASSESSMENT — LOCATION SIMPLE DESCRIPTION DERM
LOCATION SIMPLE: LEFT FOREHEAD
LOCATION SIMPLE: LEFT FOREARM
LOCATION SIMPLE: GLABELLA
LOCATION SIMPLE: RIGHT FOREHEAD
LOCATION SIMPLE: RIGHT FOREARM
LOCATION SIMPLE: INFERIOR FOREHEAD

## 2019-04-19 ASSESSMENT — LOCATION DETAILED DESCRIPTION DERM
LOCATION DETAILED: RIGHT PROXIMAL DORSAL FOREARM
LOCATION DETAILED: RIGHT MEDIAL FOREHEAD
LOCATION DETAILED: LEFT DISTAL DORSAL FOREARM
LOCATION DETAILED: RIGHT INFERIOR FOREHEAD
LOCATION DETAILED: RIGHT SUPERIOR FOREHEAD
LOCATION DETAILED: INFERIOR MID FOREHEAD
LOCATION DETAILED: LEFT SUPERIOR FOREHEAD
LOCATION DETAILED: GLABELLA

## 2019-04-19 NOTE — HPI: SKIN LESION
Is This A New Presentation, Or A Follow-Up?: Skin Lesion
What Type Of Note Output Would You Prefer (Optional)?: Standard Output
How Severe Is Your Skin Lesion?: moderate
Has Your Skin Lesion Been Treated?: not been treated
Additional History: patient declined full body skin check.

## 2019-04-29 ENCOUNTER — PATIENT MESSAGE (OUTPATIENT)
Dept: MEDICAL GROUP | Facility: PHYSICIAN GROUP | Age: 69
End: 2019-04-29

## 2019-04-29 DIAGNOSIS — E53.8 B12 DEFICIENCY: ICD-10-CM

## 2019-04-30 RX ORDER — CYANOCOBALAMIN 1000 UG/ML
1000 INJECTION, SOLUTION INTRAMUSCULAR; SUBCUTANEOUS
Qty: 3 VIAL | Refills: 3 | Status: SHIPPED | OUTPATIENT
Start: 2019-04-30 | End: 2019-07-29

## 2019-06-17 ENCOUNTER — HOSPITAL ENCOUNTER (OUTPATIENT)
Dept: RADIOLOGY | Facility: MEDICAL CENTER | Age: 69
End: 2019-06-17
Attending: PHYSICAL MEDICINE & REHABILITATION
Payer: MEDICARE

## 2019-06-17 DIAGNOSIS — M25.512 PAIN, JOINT, SHOULDER, LEFT: ICD-10-CM

## 2019-06-17 PROCEDURE — 73222 MRI JOINT UPR EXTREM W/DYE: CPT | Mod: LT

## 2019-06-17 PROCEDURE — 23350 INJECTION FOR SHOULDER X-RAY: CPT | Mod: LT

## 2019-06-17 PROCEDURE — 700117 HCHG RX CONTRAST REV CODE 255: Performed by: PHYSICAL MEDICINE & REHABILITATION

## 2019-06-17 PROCEDURE — A9585 GADOBUTROL INJECTION: HCPCS | Performed by: PHYSICAL MEDICINE & REHABILITATION

## 2019-06-17 RX ORDER — GADOBUTROL 604.72 MG/ML
2 INJECTION INTRAVENOUS ONCE
Status: COMPLETED | OUTPATIENT
Start: 2019-06-17 | End: 2019-06-17

## 2019-06-17 RX ORDER — LIDOCAINE HYDROCHLORIDE 10 MG/ML
INJECTION, SOLUTION INFILTRATION; PERINEURAL
Status: DISPENSED
Start: 2019-06-17 | End: 2019-06-17

## 2019-06-17 RX ADMIN — IOHEXOL 5 ML: 300 INJECTION, SOLUTION INTRAVENOUS at 09:25

## 2019-06-17 RX ADMIN — GADOBUTROL 0.1 ML: 604.72 INJECTION INTRAVENOUS at 09:25

## 2019-06-17 NOTE — PROCEDURES
EXAMINATION:  6/17/2019 8:34 AM    HISTORY/REASON FOR EXAM:  SHOULDER PAIN.         TECHNIQUE/EXAM DESCRIPTION: Contrast injection prior to MRI scan.    COMPARISON:  None    FLUOROSCOPY TIME: 0.6 minutes    FINDINGS:  The procedure and its risks were explained the patient.      accepted these risks and agreed to the procedure. The appropriate shoulder was marked with patient agreement. A timeout was performed.    Utilizing sterile technique and fluoroscopic guidance, 12 cc of contrast material was injected into the left shoulder following local anesthesia.  This consisted of a mixture of 7 cc of iodinated contrast material, 10 mL of saline, 3 mL of Xylocaine 1%, and .1 cc of gadolinium.    The patient tolerated the procedure well.    Total fluoroscopic images: 1 fluoroscopic screen capture image obtained.    IMPRESSION:  Contrast injection prior to MRI scan of the shoulder.

## 2019-07-23 ENCOUNTER — OFFICE VISIT (OUTPATIENT)
Dept: PULMONOLOGY | Facility: HOSPICE | Age: 69
End: 2019-07-23
Payer: MEDICARE

## 2019-07-23 VITALS
HEIGHT: 64 IN | DIASTOLIC BLOOD PRESSURE: 78 MMHG | RESPIRATION RATE: 14 BRPM | BODY MASS INDEX: 28.68 KG/M2 | TEMPERATURE: 98.2 F | HEART RATE: 77 BPM | WEIGHT: 168 LBS | SYSTOLIC BLOOD PRESSURE: 124 MMHG | OXYGEN SATURATION: 96 %

## 2019-07-23 DIAGNOSIS — K21.9 GASTROESOPHAGEAL REFLUX DISEASE, ESOPHAGITIS PRESENCE NOT SPECIFIED: ICD-10-CM

## 2019-07-23 DIAGNOSIS — J45.20 MILD INTERMITTENT ASTHMA WITHOUT COMPLICATION: ICD-10-CM

## 2019-07-23 PROCEDURE — 99214 OFFICE O/P EST MOD 30 MIN: CPT | Performed by: INTERNAL MEDICINE

## 2019-07-23 RX ORDER — ZOSTER VACCINE RECOMBINANT, ADJUVANTED 50 MCG/0.5
KIT INTRAMUSCULAR
COMMUNITY
Start: 2019-05-17 | End: 2021-04-04

## 2019-07-23 RX ORDER — PREDNISONE 10 MG/1
TABLET ORAL
COMMUNITY
Start: 2019-03-28 | End: 2021-04-04

## 2019-07-23 RX ORDER — AZITHROMYCIN 250 MG/1
250 TABLET, FILM COATED ORAL
COMMUNITY
Start: 2019-03-28 | End: 2020-02-13

## 2019-07-23 ASSESSMENT — PAIN SCALES - GENERAL: PAINLEVEL: NO PAIN

## 2019-07-23 NOTE — PROGRESS NOTES
Jacinda Patiño is a 69 y.o. female here for reactive airway disease with significant exacerbation in spring time related to infection. Patient was referred by primary care.    History of Present Illness:      This lady moved here from [place], seems to be adjusting to this climate, her episode in January has resolved.  At that time she had a fairly significant bronchitis and reactive airways, did require a steroid Dosepak and antibiotics and then a recent repeat.    She presently is well controlled on Breo, Singulair and albuterol.  Allergies are minimal, but Singulair seems to be beneficial.    She no longer takes the medications for her back, had some cervical compression, and injection seems to have resolved that and no longer requires the medication that affected her breathing.    Presently her major complaint is postnasal drip, she sees ear nose and throat, but pulmonary status is excellent.  Health maintenance issues include a body mass index of 28, she is exercising and remains fairly vigorous.  Her vaccinations are current, Prevnar and Pneumovax are both up-to-date and flu vaccine will be administered in the fall.  Not smoking and never has.    We will see her in 6 months sooner for problems    Constitutional ROS: No unexpected change in weight, No unexplained fevers  Eyes: No change in vision or blurring or double vision  Mouth/Throat ROS: No sore throat, No recent change in voice or hoarseness  Pulmonary ROS: See present history for pertinent positives  Cardiovascular ROS: No chest pain to suggest acute coronary syndrome  Gastrointestinal ROS: No abdominal pain to suggest peptic disease  Musculoskeletal/Extremities ROS: no acute artritis or unusual swelling  Hematologic/Lymphatic ROS: No easy bleeding or unusual lymph node swelling  Neurologic ROS: No new or unusual weakness  Psychiatric ROS: No hallucinations  Allergic/Immunologic: No  urticaria or allergic rash      Current Outpatient Prescriptions    Medication Sig Dispense Refill   • Estradiol-Norethindrone Acet 0.5-0.1 MG Tab TAKE 1 TABLET DAILY 84 Tab 3   • Lifitegrast (XIIDRA) 5 % Solution by Ophthalmic route.     • Fluticasone Furoate-Vilanterol (BREO ELLIPTA) 200-25 MCG/INH AEROSOL POWDER, BREATH ACTIVATED Inhale 1 Puff by mouth every day. Rinse mouth after use. 3 Each 3   • montelukast (SINGULAIR) 10 MG Tab Take 1 Tab by mouth every day. 90 Tab 3   • omeprazole (PRILOSEC) 40 MG delayed-release capsule Take 1 Cap by mouth every day. 90 Cap 3   • simvastatin (ZOCOR) 20 MG Tab Take 1 Tab by mouth every evening. 90 Tab 3   • aspirin 81 MG tablet Take 81 mg by mouth.     • Cholecalciferol 1000 units Chew Tab Take 5,000 Units by mouth.     • Flaxseed Oil (LINSEED OIL) Oil 1400mg daily     • Probiotic Product (ALIGN PO) Take  by mouth.     • Naproxen (NAPROSYN PO) Take  by mouth.     • Cyanocobalamin (VITAMIN B 12 PO) Take  by mouth.     • azithromycin (ZITHROMAX) 250 MG Tab Take 250 mg by mouth.     • metronidazole (METROCREAM) 0.75 % cream      • predniSONE (DELTASONE) 10 MG Tab 4 po every day x 3 d 3 po every day x 3d  2po every day X 3 d 1 po every day X 3 d     • SHINGRIX 50 MCG/0.5ML Recon Susp      • cyanocobalamin (VITAMIN B-12) 1000 MCG/ML Solution 1 mL by Intramuscular route every 30 days for 90 days. 3 Vial 3   • diazePAM (VALIUM) 5 MG Tab      • albuterol (PROAIR HFA) 108 (90 Base) MCG/ACT Aero Soln inhalation aerosol USE 2 INHALATIONS EVERY 6 HOURS AS NEEDED FOR SHORTNESS OF BREATH 3 Inhaler 3   • ipratropium-albuterol (DUONEB) 0.5-2.5 (3) MG/3ML nebulizer solution 3 mL by Nebulization route every 6 hours as needed for Shortness of Breath. 30 Bullet 4   • acetaminophen (TYLENOL) 325 MG Tab Take 650 mg by mouth.     • Nasal Wash (ALKALOL) Solution Spray  in nose.     • GuaiFENesin (MUCINEX PO) Take  by mouth.     • ibuprofen (MOTRIN) 600 MG TABS Take 600 mg by mouth every 6 hours as needed.       No current facility-administered medications for  "this visit.        Social History   Substance Use Topics   • Smoking status: Never Smoker   • Smokeless tobacco: Never Used   • Alcohol use No        Past Medical History:   Diagnosis Date   • HLD (hyperlipidemia) 7/7/2015   • Mild intermittent asthma without complication 7/19/2016       Past Surgical History:   Procedure Laterality Date   • KNEE ARTHROSCOPY Right 2015   • LUMPECTOMY Left 4/2012   • BASAL CELL EXCISION  2010    Forehead    • BLEPHAROPLASTY  2010   • SHOULDER ARTHROSCOPY  1992   • HERNIA REPAIR  1990    inguinal    • LAPAROSCOPY  1984    AGA   • PB REMV 2ND CATARACT,CORN-SCLER SECTN         Allergies: Patient has no known allergies.    Family History   Problem Relation Age of Onset   • Heart Attack Mother    • Heart Disease Mother    • Hyperlipidemia Mother    • Hypertension Mother    • Stroke Mother    • Heart Attack Maternal Aunt        Physical Examination    Vitals:    07/23/19 0847   Height: 1.626 m (5' 4.02\")   Weight: 76.2 kg (168 lb)   Weight % change since last entry.: 0 %   BP: 124/78   Pulse: 77   BMI (Calculated): 28.82   Resp: 14   Temp: 36.8 °C (98.2 °F)   TempSrc: Oral       General Appearance: alert, no distress  Skin: Skin color, texture, turgor normal. No rashes or lesions.  Eyes: negative  Oropharynx: Lips, mucosa, and tongue normal. Teeth and gums normal. Oropharynx moist and without lesion  Lungs: positive findings: Remarkably clear without wheezes or rhonchi  Heart: negative. RRR without murmur, gallop, or rubs.  No ectopy.  Abdomen: Abdomen soft, non-tender. .   Extremities:  No deformities, edema, or skin discoloration  Joints: No acute arthritis  Peripheral Pulses:perfused  Neurologic: intact grossly  No cyanosis    II (soft palate, uvula, fauces visible)    Imaging: None today    PFTS: None today      Assessment and Plan  1. Mild intermittent asthma without complication  Postinfectious pattern in the springtime, gets flu vaccine, has maintenance inhalers    2. " Gastroesophageal reflux disease, esophagitis presence not specified  Not symptomatic currently      Followup Return in about 6 months (around 1/23/2020) for follow up visit with Dr. Elizabeth Abdullahi.

## 2019-07-23 NOTE — PATIENT INSTRUCTIONS
This lady moved here from [place], seems to be adjusting to this climate, her episode in January has resolved.  At that time she had a fairly significant bronchitis and reactive airways, did require a steroid Dosepak and antibiotics and then a recent repeat.    She presently is well controlled on Breo, Singulair and albuterol.  Allergies are minimal, but Singulair seems to be beneficial.    She no longer takes the medications for her back, had some cervical compression, and injection seems to have resolved that and no longer requires the medication that affected her breathing.    Presently her major complaint is postnasal drip, she sees ear nose and throat, but pulmonary status is excellent.  Health maintenance issues include a body mass index of 28, she is exercising and remains fairly vigorous.  Her vaccinations are current, Prevnar and Pneumovax are both up-to-date and flu vaccine will be administered in the fall.  Not smoking and never has.    We will see her in 6 months sooner for problems

## 2019-08-28 ENCOUNTER — PATIENT MESSAGE (OUTPATIENT)
Dept: PULMONOLOGY | Facility: HOSPICE | Age: 69
End: 2019-08-28

## 2019-08-29 NOTE — TELEPHONE ENCOUNTER
From: Jacinda Patiño  To: Elizabeth Abdullahi M.D.  Sent: 8/28/2019 6:20 PM PDT  Subject: Prescription Question    Dear Dr. Abdullahi,   My ENT physician has prescribed Flonase for a possible sinus issue. I have been using it for 5 days. I see in the patient info. that it suggests to check with your physician if using a steroid medication for asthma. I use Brio. I have had no lung/bronchial problems since last Dec. (a record) but began having burning sensations yesterday and now congestion with mucous. Any possible connection to using Flonase or in your experience is that just coincidental and should be fine to continue the Flonase. Thank you for your kind response.  Jacinda Patiño

## 2019-11-08 RX ORDER — SIMVASTATIN 20 MG
TABLET ORAL
Qty: 90 TAB | Refills: 1 | Status: SHIPPED | OUTPATIENT
Start: 2019-11-08 | End: 2019-11-26 | Stop reason: SDUPTHER

## 2019-11-08 NOTE — TELEPHONE ENCOUNTER
Pt has had OV within the 12 month protocol and lipid panel is current. 6 month supply sent to pharmacy.   Lab Results   Component Value Date/Time    CHOLSTRLTOT 180 01/17/2019 09:16 AM    LDL 94 01/17/2019 09:16 AM    HDL 67 01/17/2019 09:16 AM    TRIGLYCERIDE 96 01/17/2019 09:16 AM       Lab Results   Component Value Date/Time    SODIUM 141 01/17/2019 09:16 AM    POTASSIUM 4.2 01/17/2019 09:16 AM    CHLORIDE 105 01/17/2019 09:16 AM    CO2 29 01/17/2019 09:16 AM    GLUCOSE 91 01/17/2019 09:16 AM    BUN 13 01/17/2019 09:16 AM    CREATININE 0.81 01/17/2019 09:16 AM     Lab Results   Component Value Date/Time    ALKPHOSPHAT 56 01/17/2019 09:16 AM    ASTSGOT 26 01/17/2019 09:16 AM    ALTSGPT 27 01/17/2019 09:16 AM    TBILIRUBIN 1.1 01/17/2019 09:16 AM

## 2019-11-26 ENCOUNTER — OFFICE VISIT (OUTPATIENT)
Dept: MEDICAL GROUP | Facility: PHYSICIAN GROUP | Age: 69
End: 2019-11-26
Payer: MEDICARE

## 2019-11-26 ENCOUNTER — HOSPITAL ENCOUNTER (OUTPATIENT)
Dept: LAB | Facility: MEDICAL CENTER | Age: 69
End: 2019-11-26
Attending: FAMILY MEDICINE
Payer: MEDICARE

## 2019-11-26 ENCOUNTER — HOSPITAL ENCOUNTER (OUTPATIENT)
Dept: RADIOLOGY | Facility: MEDICAL CENTER | Age: 69
End: 2019-11-26
Attending: FAMILY MEDICINE
Payer: MEDICARE

## 2019-11-26 VITALS
OXYGEN SATURATION: 96 % | HEIGHT: 64 IN | TEMPERATURE: 97.8 F | BODY MASS INDEX: 25.95 KG/M2 | RESPIRATION RATE: 16 BRPM | SYSTOLIC BLOOD PRESSURE: 122 MMHG | WEIGHT: 152 LBS | HEART RATE: 86 BPM | DIASTOLIC BLOOD PRESSURE: 68 MMHG

## 2019-11-26 DIAGNOSIS — E61.1 IRON DEFICIENCY: ICD-10-CM

## 2019-11-26 DIAGNOSIS — E78.5 HYPERLIPIDEMIA, UNSPECIFIED HYPERLIPIDEMIA TYPE: ICD-10-CM

## 2019-11-26 DIAGNOSIS — E53.8 B12 DEFICIENCY: ICD-10-CM

## 2019-11-26 DIAGNOSIS — R94.6 THYROID FUNCTION STUDY ABNORMALITY: ICD-10-CM

## 2019-11-26 DIAGNOSIS — Z79.890 HORMONE REPLACEMENT THERAPY (POSTMENOPAUSAL): ICD-10-CM

## 2019-11-26 DIAGNOSIS — K21.9 GASTROESOPHAGEAL REFLUX DISEASE, ESOPHAGITIS PRESENCE NOT SPECIFIED: ICD-10-CM

## 2019-11-26 DIAGNOSIS — H90.42 SENSORINEURAL HEARING LOSS (SNHL) OF LEFT EAR WITH UNRESTRICTED HEARING OF RIGHT EAR: ICD-10-CM

## 2019-11-26 DIAGNOSIS — Z12.31 VISIT FOR SCREENING MAMMOGRAM: ICD-10-CM

## 2019-11-26 DIAGNOSIS — D56.3 THALASSEMIA MINOR: ICD-10-CM

## 2019-11-26 DIAGNOSIS — G47.01 INSOMNIA DUE TO MEDICAL CONDITION: ICD-10-CM

## 2019-11-26 LAB
ALBUMIN SERPL BCP-MCNC: 4.4 G/DL (ref 3.2–4.9)
ALBUMIN/GLOB SERPL: 1.4 G/DL
ALP SERPL-CCNC: 85 U/L (ref 30–99)
ALT SERPL-CCNC: 11 U/L (ref 2–50)
ANION GAP SERPL CALC-SCNC: 8 MMOL/L (ref 0–11.9)
AST SERPL-CCNC: 18 U/L (ref 12–45)
BASOPHILS # BLD AUTO: 0.7 % (ref 0–1.8)
BASOPHILS # BLD: 0.04 K/UL (ref 0–0.12)
BILIRUB SERPL-MCNC: 0.6 MG/DL (ref 0.1–1.5)
BUN SERPL-MCNC: 10 MG/DL (ref 8–22)
CALCIUM SERPL-MCNC: 9.2 MG/DL (ref 8.5–10.5)
CHLORIDE SERPL-SCNC: 104 MMOL/L (ref 96–112)
CHOLEST SERPL-MCNC: 170 MG/DL (ref 100–199)
CO2 SERPL-SCNC: 28 MMOL/L (ref 20–33)
CREAT SERPL-MCNC: 0.74 MG/DL (ref 0.5–1.4)
EOSINOPHIL # BLD AUTO: 0.06 K/UL (ref 0–0.51)
EOSINOPHIL NFR BLD: 1 % (ref 0–6.9)
ERYTHROCYTE [DISTWIDTH] IN BLOOD BY AUTOMATED COUNT: 39.2 FL (ref 35.9–50)
GLOBULIN SER CALC-MCNC: 3.1 G/DL (ref 1.9–3.5)
GLUCOSE SERPL-MCNC: 83 MG/DL (ref 65–99)
HCT VFR BLD AUTO: 34.4 % (ref 37–47)
HDLC SERPL-MCNC: 66 MG/DL
HGB BLD-MCNC: 10.7 G/DL (ref 12–16)
IMM GRANULOCYTES # BLD AUTO: 0.02 K/UL (ref 0–0.11)
IMM GRANULOCYTES NFR BLD AUTO: 0.3 % (ref 0–0.9)
IRON SATN MFR SERPL: 14 % (ref 15–55)
IRON SERPL-MCNC: 60 UG/DL (ref 40–170)
LDLC SERPL CALC-MCNC: 90 MG/DL
LYMPHOCYTES # BLD AUTO: 1.35 K/UL (ref 1–4.8)
LYMPHOCYTES NFR BLD: 23.3 % (ref 22–41)
MCH RBC QN AUTO: 21.2 PG (ref 27–33)
MCHC RBC AUTO-ENTMCNC: 31.1 G/DL (ref 33.6–35)
MCV RBC AUTO: 68.1 FL (ref 81.4–97.8)
MONOCYTES # BLD AUTO: 0.31 K/UL (ref 0–0.85)
MONOCYTES NFR BLD AUTO: 5.3 % (ref 0–13.4)
NEUTROPHILS # BLD AUTO: 4.02 K/UL (ref 2–7.15)
NEUTROPHILS NFR BLD: 69.4 % (ref 44–72)
NRBC # BLD AUTO: 0 K/UL
NRBC BLD-RTO: 0 /100 WBC
PLATELET # BLD AUTO: 387 K/UL (ref 164–446)
PMV BLD AUTO: 11.3 FL (ref 9–12.9)
POTASSIUM SERPL-SCNC: 3.8 MMOL/L (ref 3.6–5.5)
PROT SERPL-MCNC: 7.5 G/DL (ref 6–8.2)
RBC # BLD AUTO: 5.05 M/UL (ref 4.2–5.4)
SODIUM SERPL-SCNC: 140 MMOL/L (ref 135–145)
TIBC SERPL-MCNC: 420 UG/DL (ref 250–450)
TRIGL SERPL-MCNC: 69 MG/DL (ref 0–149)
WBC # BLD AUTO: 5.8 K/UL (ref 4.8–10.8)

## 2019-11-26 PROCEDURE — 83540 ASSAY OF IRON: CPT

## 2019-11-26 PROCEDURE — 85025 COMPLETE CBC W/AUTO DIFF WBC: CPT

## 2019-11-26 PROCEDURE — 99214 OFFICE O/P EST MOD 30 MIN: CPT | Performed by: FAMILY MEDICINE

## 2019-11-26 PROCEDURE — 82728 ASSAY OF FERRITIN: CPT

## 2019-11-26 PROCEDURE — 80061 LIPID PANEL: CPT

## 2019-11-26 PROCEDURE — 84439 ASSAY OF FREE THYROXINE: CPT

## 2019-11-26 PROCEDURE — 82607 VITAMIN B-12: CPT

## 2019-11-26 PROCEDURE — 80053 COMPREHEN METABOLIC PANEL: CPT

## 2019-11-26 PROCEDURE — 84480 ASSAY TRIIODOTHYRONINE (T3): CPT

## 2019-11-26 PROCEDURE — 77063 BREAST TOMOSYNTHESIS BI: CPT

## 2019-11-26 PROCEDURE — 83550 IRON BINDING TEST: CPT

## 2019-11-26 PROCEDURE — 36415 COLL VENOUS BLD VENIPUNCTURE: CPT

## 2019-11-26 PROCEDURE — 84443 ASSAY THYROID STIM HORMONE: CPT

## 2019-11-26 PROCEDURE — 84482 T3 REVERSE: CPT

## 2019-11-26 RX ORDER — DIAZEPAM 5 MG/1
5 TABLET ORAL NIGHTLY PRN
Qty: 90 TAB | Refills: 0 | Status: SHIPPED
Start: 2019-11-26 | End: 2020-02-13

## 2019-11-26 RX ORDER — CYANOCOBALAMIN 1000 UG/ML
1000 INJECTION, SOLUTION INTRAMUSCULAR; SUBCUTANEOUS
Qty: 6 ML | Refills: 3 | Status: SHIPPED | OUTPATIENT
Start: 2019-11-26 | End: 2020-10-12 | Stop reason: SDUPTHER

## 2019-11-26 RX ORDER — OMEPRAZOLE 40 MG/1
40 CAPSULE, DELAYED RELEASE ORAL DAILY
Qty: 90 CAP | Refills: 3 | Status: SHIPPED | OUTPATIENT
Start: 2019-11-26 | End: 2020-12-04 | Stop reason: SDUPTHER

## 2019-11-26 RX ORDER — ESTRADIOL AND NORETHINDRONE ACETATE .5; .1 MG/1; MG/1
TABLET ORAL
Qty: 84 TAB | Refills: 3 | Status: SHIPPED | OUTPATIENT
Start: 2019-11-26 | End: 2020-10-27

## 2019-11-26 RX ORDER — SIMVASTATIN 20 MG
TABLET ORAL
Qty: 90 TAB | Refills: 3 | Status: SHIPPED | OUTPATIENT
Start: 2019-11-26 | End: 2020-12-04 | Stop reason: SDUPTHER

## 2019-11-26 NOTE — PROGRESS NOTES
Chief Complaint   Patient presents with   • Insomnia   • Hearing Loss     lft ear x several hours; one event in August 2019   • Referral Needed     possibly to endo       HISTORY OF PRESENT ILLNESS: Patient is a 69 y.o. female established patient here today for the following concerns:    1. Sensorineural hearing loss (SNHL) of left ear with unrestricted hearing of right ear  [place] is here today for a follow-up.  She reports that she had a new problem develop in the last several weeks in which she awoke in her bed with sudden left hearing loss that lasted approximately 2 hours and then returned.  She called her ENT and had an exam which did not show any significant changes, may be only a mild reduction in left-sided hearing.  She reports no other deficits during that time.  Her ENT mentioned that may be this was a TIA and to follow-up with us.  She has not had any imaging.  No recurrence of the same issue.  Denies any weakness numbness vision changes or dysarthria.    2. Iron deficiency  3. Thalassemia minor  4. B12 deficiency  In addition patient has history of iron deficiency thalassemia minor and B12 deficiency.  She is noticing increased bruisability thinning of the skin and thinning of her hair.  She would like to have updated labs.  She also is hoping to increase her B12 injections to twice per month rather than once monthly.  She continues on PPI therapy inhibiting her absorption.    5. Thyroid function study abnormality  Noted previously to have mild elevation in TSH.  She has noticed dry thinning skin and hair loss.  She would like to have updated labs and is considering seeing an endocrinologist.  Not currently on any replacement therapy    6. Hyperlipidemia, unspecified hyperlipidemia type  Continues on simvastatin.  Due for updated lipid check.  No chest pains    7. Insomnia due to medical condition  Reports that she has had a hip replacement surgery she has been experiencing some difficulty sleeping due  to pain.  She does  requests a refill on the diazepam    8. Gastroesophageal reflux disease, esophagitis presence not specified  In addition needs refill on the omeprazole.  Denies any choking or regurgitation.    9. Hormone replacement therapy (postmenopausal)  Lastly continues on hormone replacement therapy.  Did have her mammogram done this morning.  She does have history of breast ductal hyperplasia but has had terrible menopausal symptoms vasomotor and mood.  This is stabilized it.  She has had to go off of HRT with subsequent severe reaction and has resumed it.  She is continuing on 325 of aspirin      Past Medical, Social, and Family history reviewed and updated in EPIC    Allergies:Patient has no known allergies.    Current Outpatient Medications   Medication Sig Dispense Refill   • simvastatin (ZOCOR) 20 MG Tab TAKE 1 TABLET EVERY EVENING 90 Tab 3   • Estradiol-Norethindrone Acet 0.5-0.1 MG Tab TAKE 1 TABLET DAILY 84 Tab 3   • omeprazole (PRILOSEC) 40 MG delayed-release capsule Take 1 Cap by mouth every day. 90 Cap 3   • diazePAM (VALIUM) 5 MG Tab Take 1 Tab by mouth at bedtime as needed for Sleep (insomnia) for up to 90 days. 90 Tab 0   • cyanocobalamin (VITAMIN B-12) 1000 MCG/ML Solution 1 mL by Intramuscular route every 14 days for 90 days. 6 mL 3   • metronidazole (METROCREAM) 0.75 % cream      • Lifitegrast (XIIDRA) 5 % Solution by Ophthalmic route.     • Fluticasone Furoate-Vilanterol (BREO ELLIPTA) 200-25 MCG/INH AEROSOL POWDER, BREATH ACTIVATED Inhale 1 Puff by mouth every day. Rinse mouth after use. 3 Each 3   • albuterol (PROAIR HFA) 108 (90 Base) MCG/ACT Aero Soln inhalation aerosol USE 2 INHALATIONS EVERY 6 HOURS AS NEEDED FOR SHORTNESS OF BREATH 3 Inhaler 3   • montelukast (SINGULAIR) 10 MG Tab Take 1 Tab by mouth every day. 90 Tab 3   • aspirin 81 MG tablet Take 81 mg by mouth.     • ipratropium-albuterol (DUONEB) 0.5-2.5 (3) MG/3ML nebulizer solution 3 mL by Nebulization route every 6 hours  "as needed for Shortness of Breath. 30 Bullet 4   • acetaminophen (TYLENOL) 325 MG Tab Take 650 mg by mouth.     • Cholecalciferol 1000 units Chew Tab Take 5,000 Units by mouth.     • Flaxseed Oil (LINSEED OIL) Oil 1400mg daily     • Probiotic Product (ALIGN PO) Take  by mouth.     • Nasal Wash (ALKALOL) Solution Spray  in nose.     • GuaiFENesin (MUCINEX PO) Take  by mouth.     • Naproxen (NAPROSYN PO) Take  by mouth.     • ibuprofen (MOTRIN) 600 MG TABS Take 600 mg by mouth every 6 hours as needed.     • azithromycin (ZITHROMAX) 250 MG Tab Take 250 mg by mouth.     • predniSONE (DELTASONE) 10 MG Tab 4 po every day x 3 d 3 po every day x 3d  2po every day X 3 d 1 po every day X 3 d     • SHINGRIX 50 MCG/0.5ML Recon Susp        No current facility-administered medications for this visit.          ROS:  Review of Systems   Constitutional: Negative for fever, chills, weight loss and malaise/fatigue.   HENT: Negative for ear pain, nosebleeds, congestion, sore throat and neck pain.    Eyes: Negative for blurred vision.   Respiratory: Negative for cough, sputum production, shortness of breath and wheezing.    Cardiovascular: Negative for chest pain, palpitations,  and leg swelling.   Gastrointestinal: Negative for heartburn, nausea, vomiting, diarrhea and abdominal pain.   Genitourinary: Negative for dysuria, urgency and frequency.   Musculoskeletal: Negative for myalgias, back pain and joint pain.   Skin: Negative for rash and itching.   Neurological: Negative for dizziness, tingling, tremors, sensory change, focal weakness and headaches.   Endo/Heme/Allergies: Does not bruise/bleed easily.   Psychiatric/Behavioral: Negative for depression, anxiety, suicidal ideas, insomnia and memory loss.      Exam:  /68   Pulse 86   Temp 36.6 °C (97.8 °F)   Resp 16   Ht 1.626 m (5' 4.02\")   Wt 68.9 kg (152 lb)   SpO2 96%     General:  Well nourished, well developed in NAD  Head is grossly normal.  Neck: Supple without JVD "   Pulmonary:  Normal effort.   Cardiovascular: Regular rate  Extremities: no clubbing, cyanosis, or edema.  Psych: affect appropriate      Please note that this dictation was created using voice recognition software. I have made every reasonable attempt to correct obvious errors, but I expect that there are errors of grammar and possibly content that I did not discover before finalizing the note.    Assessment/Plan:  1. Sensorineural hearing loss (SNHL) of left ear with unrestricted hearing of right ear    - MR-BRAIN IAC'S W/WO; Future  - Comp Metabolic Panel; Future  - Lipid Profile; Future    2. Iron deficiency  - CBC WITH DIFFERENTIAL; Future  - IRON/TOTAL IRON BIND; Future  - FERRITIN; Future    3. Thalassemia minor  - CBC WITH DIFFERENTIAL; Future  - IRON/TOTAL IRON BIND; Future  - FERRITIN; Future    4. B12 deficiency  - VITAMIN B12; Future  - cyanocobalamin (VITAMIN B-12) 1000 MCG/ML Solution; 1 mL by Intramuscular route every 14 days for 90 days.  Dispense: 6 mL; Refill: 3    5. Thyroid function study abnormality  - TSH; Future  - FREE THYROXINE; Future  - TRIIDOTHYRONINE; Future  - REVERSE T3; Future    6. Hyperlipidemia, unspecified hyperlipidemia type  - Comp Metabolic Panel; Future  - Lipid Profile; Future  - simvastatin (ZOCOR) 20 MG Tab; TAKE 1 TABLET EVERY EVENING  Dispense: 90 Tab; Refill: 3    7. Insomnia due to medical condition  - diazePAM (VALIUM) 5 MG Tab; Take 1 Tab by mouth at bedtime as needed for Sleep (insomnia) for up to 90 days.  Dispense: 90 Tab; Refill: 0    8. Gastroesophageal reflux disease, esophagitis presence not specified  - omeprazole (PRILOSEC) 40 MG delayed-release capsule; Take 1 Cap by mouth every day.  Dispense: 90 Cap; Refill: 3    9. Hormone replacement therapy (postmenopausal)  - Estradiol-Norethindrone Acet 0.5-0.1 MG Tab; TAKE 1 TABLET DAILY  Dispense: 84 Tab; Refill: 3

## 2019-11-27 LAB
FERRITIN SERPL-MCNC: 17.6 NG/ML (ref 10–291)
T3 SERPL-MCNC: 104.1 NG/DL (ref 60–181)
T4 FREE SERPL-MCNC: 0.89 NG/DL (ref 0.53–1.43)
TSH SERPL DL<=0.005 MIU/L-ACNC: 2.03 UIU/ML (ref 0.38–5.33)
VIT B12 SERPL-MCNC: 360 PG/ML (ref 211–911)

## 2019-11-29 LAB — T3REVERSE SERPL-MCNC: 18.6 NG/DL (ref 9–27)

## 2019-12-03 ENCOUNTER — TELEPHONE (OUTPATIENT)
Dept: MEDICAL GROUP | Facility: PHYSICIAN GROUP | Age: 69
End: 2019-12-03

## 2019-12-03 DIAGNOSIS — D50.9 IRON DEFICIENCY ANEMIA, UNSPECIFIED IRON DEFICIENCY ANEMIA TYPE: ICD-10-CM

## 2019-12-03 RX ORDER — LANOLIN ALCOHOL/MO/W.PET/CERES
325 CREAM (GRAM) TOPICAL 2 TIMES DAILY WITH MEALS
Qty: 180 TAB | Refills: 3 | Status: SHIPPED
Start: 2019-12-03 | End: 2020-05-29

## 2019-12-04 NOTE — TELEPHONE ENCOUNTER
I recommend that she follow up.  Typically thalassemia can lead to an excess of iron due to rapid turn over of red cells.

## 2019-12-04 NOTE — TELEPHONE ENCOUNTER
Patient states she cannot take the iron.  She has tried iron pills and it makes her throw her up.  She states she has Thalassemia minor and it prevents her from taking it.  If there is anything else that she can do, please advise.  Thank you.

## 2019-12-12 ENCOUNTER — TELEPHONE (OUTPATIENT)
Dept: MEDICAL GROUP | Facility: PHYSICIAN GROUP | Age: 69
End: 2019-12-12

## 2019-12-12 DIAGNOSIS — F40.240 CLAUSTROPHOBIA: ICD-10-CM

## 2019-12-12 RX ORDER — DIAZEPAM 5 MG/1
5-10 TABLET ORAL
Qty: 2 TAB | Refills: 0 | Status: SHIPPED
Start: 2019-12-12 | End: 2019-12-13

## 2019-12-13 NOTE — TELEPHONE ENCOUNTER
VOICEMAIL  1. Caller Name: Jacinda Patiño                        Call Back Number: [phone] (home)       2. Message: pt is scheduled for MRI on Mon and states she is getting very anxious.  She is asking for something to help during the procedure    3. Patient approves office to leave a detailed voicemail/MyChart message: N\A

## 2019-12-16 ENCOUNTER — HOSPITAL ENCOUNTER (OUTPATIENT)
Dept: RADIOLOGY | Facility: MEDICAL CENTER | Age: 69
End: 2019-12-16
Attending: FAMILY MEDICINE
Payer: MEDICARE

## 2019-12-16 DIAGNOSIS — H90.42 SENSORINEURAL HEARING LOSS (SNHL) OF LEFT EAR WITH UNRESTRICTED HEARING OF RIGHT EAR: ICD-10-CM

## 2019-12-16 PROCEDURE — 70553 MRI BRAIN STEM W/O & W/DYE: CPT

## 2019-12-16 PROCEDURE — 700117 HCHG RX CONTRAST REV CODE 255: Performed by: FAMILY MEDICINE

## 2019-12-16 PROCEDURE — A9576 INJ PROHANCE MULTIPACK: HCPCS | Performed by: FAMILY MEDICINE

## 2019-12-16 RX ADMIN — GADOTERIDOL 15 ML: 279.3 INJECTION, SOLUTION INTRAVENOUS at 10:48

## 2019-12-17 ENCOUNTER — HOSPITAL ENCOUNTER (OUTPATIENT)
Dept: RADIOLOGY | Facility: MEDICAL CENTER | Age: 69
End: 2019-12-17
Attending: FAMILY MEDICINE
Payer: MEDICARE

## 2019-12-17 DIAGNOSIS — R92.8 ABNORMAL MAMMOGRAM: ICD-10-CM

## 2019-12-17 PROCEDURE — 76642 ULTRASOUND BREAST LIMITED: CPT | Mod: LT

## 2019-12-17 PROCEDURE — 77065 DX MAMMO INCL CAD UNI: CPT | Mod: LT

## 2020-01-18 DIAGNOSIS — J45.20 MILD INTERMITTENT ASTHMA WITHOUT COMPLICATION: ICD-10-CM

## 2020-01-19 DIAGNOSIS — J45.20 MILD INTERMITTENT ASTHMA WITHOUT COMPLICATION: ICD-10-CM

## 2020-01-20 RX ORDER — MONTELUKAST SODIUM 10 MG/1
TABLET ORAL
Qty: 90 TAB | Refills: 3 | Status: SHIPPED | OUTPATIENT
Start: 2020-01-20 | End: 2020-01-23 | Stop reason: SDUPTHER

## 2020-01-20 RX ORDER — ALBUTEROL SULFATE 90 UG/1
AEROSOL, METERED RESPIRATORY (INHALATION)
Qty: 3 INHALER | Refills: 3 | Status: SHIPPED | OUTPATIENT
Start: 2020-01-20 | End: 2020-01-23 | Stop reason: SDUPTHER

## 2020-01-20 NOTE — TELEPHONE ENCOUNTER
Have we ever prescribed this med? Yes.  If yes, what date? 01/18/2019    Last OV: 07/23/2019 - Dr. Abdullahi    Next OV: 01/23/2020 - Dr. Abdullahi    DX: asthma    Medications: Proair

## 2020-01-20 NOTE — TELEPHONE ENCOUNTER
Have we ever prescribed this med? Yes.  If yes, what date? 01/18/2019    Last OV: 07/23/2019 - Dr. Abdullahi    Next OV: 01/23/2020 -     DX: asthma    Medications: Montelukast

## 2020-01-22 ENCOUNTER — APPOINTMENT (RX ONLY)
Dept: URBAN - METROPOLITAN AREA CLINIC 20 | Facility: CLINIC | Age: 70
Setting detail: DERMATOLOGY
End: 2020-01-22

## 2020-01-22 DIAGNOSIS — L73.8 OTHER SPECIFIED FOLLICULAR DISORDERS: ICD-10-CM

## 2020-01-22 DIAGNOSIS — Z85.828 PERSONAL HISTORY OF OTHER MALIGNANT NEOPLASM OF SKIN: ICD-10-CM

## 2020-01-22 DIAGNOSIS — L82.1 OTHER SEBORRHEIC KERATOSIS: ICD-10-CM

## 2020-01-22 DIAGNOSIS — D18.0 HEMANGIOMA: ICD-10-CM

## 2020-01-22 DIAGNOSIS — D22 MELANOCYTIC NEVI: ICD-10-CM

## 2020-01-22 DIAGNOSIS — L81.4 OTHER MELANIN HYPERPIGMENTATION: ICD-10-CM

## 2020-01-22 PROBLEM — D18.01 HEMANGIOMA OF SKIN AND SUBCUTANEOUS TISSUE: Status: ACTIVE | Noted: 2020-01-22

## 2020-01-22 PROBLEM — D48.5 NEOPLASM OF UNCERTAIN BEHAVIOR OF SKIN: Status: ACTIVE | Noted: 2020-01-22

## 2020-01-22 PROBLEM — D22.5 MELANOCYTIC NEVI OF TRUNK: Status: ACTIVE | Noted: 2020-01-22

## 2020-01-22 PROCEDURE — 11102 TANGNTL BX SKIN SINGLE LES: CPT

## 2020-01-22 PROCEDURE — ? OBSERVATION

## 2020-01-22 PROCEDURE — ? COUNSELING

## 2020-01-22 PROCEDURE — ? PHOTO-DOCUMENTATION

## 2020-01-22 PROCEDURE — ? ADDITIONAL NOTES

## 2020-01-22 PROCEDURE — 99214 OFFICE O/P EST MOD 30 MIN: CPT | Mod: 25

## 2020-01-22 PROCEDURE — ? BIOPSY BY SHAVE METHOD

## 2020-01-22 ASSESSMENT — LOCATION DETAILED DESCRIPTION DERM
LOCATION DETAILED: INFERIOR THORACIC SPINE
LOCATION DETAILED: RIGHT VENTRAL PROXIMAL FOREARM
LOCATION DETAILED: INFERIOR MID FOREHEAD
LOCATION DETAILED: LEFT VENTRAL PROXIMAL FOREARM
LOCATION DETAILED: LEFT INFERIOR FOREHEAD
LOCATION DETAILED: SUPERIOR THORACIC SPINE
LOCATION DETAILED: RIGHT MEDIAL FOREHEAD
LOCATION DETAILED: RIGHT MEDIAL MALAR CHEEK

## 2020-01-22 ASSESSMENT — LOCATION SIMPLE DESCRIPTION DERM
LOCATION SIMPLE: LEFT FOREHEAD
LOCATION SIMPLE: INFERIOR FOREHEAD
LOCATION SIMPLE: RIGHT FOREHEAD
LOCATION SIMPLE: RIGHT CHEEK
LOCATION SIMPLE: UPPER BACK
LOCATION SIMPLE: LEFT FOREARM
LOCATION SIMPLE: RIGHT FOREARM

## 2020-01-22 ASSESSMENT — LOCATION ZONE DERM
LOCATION ZONE: FACE
LOCATION ZONE: ARM
LOCATION ZONE: TRUNK

## 2020-01-22 NOTE — PROCEDURE: MIPS QUALITY
Quality 111:Pneumonia Vaccination Status For Older Adults: Pneumococcal Vaccination Previously Received
Quality 130: Documentation Of Current Medications In The Medical Record: Current Medications Documented
Detail Level: Detailed
Quality 226: Preventive Care And Screening: Tobacco Use: Screening And Cessation Intervention: Patient screened for tobacco use and is an ex/non-smoker

## 2020-01-22 NOTE — PROCEDURE: BIOPSY BY SHAVE METHOD
Hide Topical Anesthesia?: No
Dressing: pressure dressing with telfa
Render Post-Care Instructions In Note?: yes
Biopsy Method: Personna blade
Silver Nitrate Text: The wound bed was treated with silver nitrate after the biopsy was performed.
Hemostasis: Drysol
Detail Level: Detailed
Curettage Text: The wound bed was treated with curettage after the biopsy was performed.
Billing Type: Third-Party Bill
Anesthesia Type: 1% lidocaine with 1:100,000 epinephrine and a 1:10 solution of 8.4% sodium bicarbonate
Cryotherapy Text: The wound bed was treated with cryotherapy after the biopsy was performed.
Post-Care Instructions: I reviewed with the patient in detail post-care instructions. Patient is to keep the biopsy site dry overnight. Gentle cleansing daily.  Apply petroleum ointment daily until healed. Patient may apply hydrogen peroxide soaks to remove any crusting.
Lab: [id number]
Depth Of Biopsy: dermis
Additional Anesthesia Volume In Cc (Will Not Render If 0): 0
Wound Care: Petrolatum
Lab Facility: 
Size Of Lesion In Cm: 0.3
Notification Instructions: Patient will be notified of biopsy results. However, patient instructed to call the office if not contacted within 2 weeks.
Consent: Written consent was obtained and risks were reviewed including but not limited to scarring, infection, bleeding, scabbing, incomplete removal, nerve damage and allergy to anesthesia.
Type Of Destruction Used: Curettage
Electrodesiccation Text: The wound bed was treated with electrodesiccation after the biopsy was performed.
Biopsy Type: H and E
Anesthesia Volume In Cc: 1
Electrodesiccation And Curettage Text: The wound bed was treated with electrodesiccation and curettage after the biopsy was performed.

## 2020-01-23 ENCOUNTER — OFFICE VISIT (OUTPATIENT)
Dept: PULMONOLOGY | Facility: HOSPICE | Age: 70
End: 2020-01-23
Payer: MEDICARE

## 2020-01-23 VITALS
OXYGEN SATURATION: 100 % | HEART RATE: 84 BPM | DIASTOLIC BLOOD PRESSURE: 76 MMHG | WEIGHT: 163 LBS | HEIGHT: 64 IN | SYSTOLIC BLOOD PRESSURE: 122 MMHG | BODY MASS INDEX: 27.83 KG/M2

## 2020-01-23 DIAGNOSIS — R05.9 COUGH: ICD-10-CM

## 2020-01-23 DIAGNOSIS — J45.20 MILD INTERMITTENT ASTHMA WITHOUT COMPLICATION: ICD-10-CM

## 2020-01-23 PROCEDURE — 99214 OFFICE O/P EST MOD 30 MIN: CPT | Performed by: INTERNAL MEDICINE

## 2020-01-23 RX ORDER — ALBUTEROL SULFATE 90 UG/1
AEROSOL, METERED RESPIRATORY (INHALATION)
Qty: 3 INHALER | Refills: 3 | Status: SHIPPED | OUTPATIENT
Start: 2020-01-23 | End: 2020-11-16

## 2020-01-23 RX ORDER — MONTELUKAST SODIUM 10 MG/1
TABLET ORAL
Qty: 90 TAB | Refills: 3 | Status: SHIPPED | OUTPATIENT
Start: 2020-01-23 | End: 2021-01-14

## 2020-01-23 RX ORDER — IPRATROPIUM BROMIDE AND ALBUTEROL SULFATE 2.5; .5 MG/3ML; MG/3ML
3 SOLUTION RESPIRATORY (INHALATION) EVERY 6 HOURS PRN
Qty: 30 BULLET | Refills: 4 | Status: SHIPPED | OUTPATIENT
Start: 2020-01-23 | End: 2021-05-05 | Stop reason: SDUPTHER

## 2020-01-23 NOTE — PROGRESS NOTES
Jacinda Patiño is a 69 y.o. female here for reactive airways on maintenance inhalers. Patient was referred by her primary care.    History of Present Illness:      This lady comes in today after having hip surgery about 12 weeks ago, the pain medications cause some alterations in her breathing pattern, oxycodone was unpleasant, she thinks gabapentin resulted in a need for her nebulizer, I do not think those were related.    She is doing remarkably well, has not required the steroid Dosepak or the Z-Lalo.  She is well controlled on Breo, I would not change her dose as she is well controlled on the current situation and in addition Singulair and albuterol are available.  She has a nebulizer, can use either albuterol or the combination albuterol ipratropium.    Health maintenance is good body mass index is good she is active lungs are clear vaccinations are all current and she is traveling soon, we will see her in 12 months or as needed    She does see Dr. Richmond, he told her that she did not have asthma, I think what he meant was that she does not have allergic triggered reactive airways but she certainly has infection and irritant related reactive airways with good response to bronchodilators.    Constitutional ROS: No unexpected change in weight, No unexplained fevers  Eyes: No change in vision or blurring or double vision  Mouth/Throat ROS: No sore throat, No recent change in voice or hoarseness  Pulmonary ROS: See present history for pertinent positives  Cardiovascular ROS: No chest pain to suggest acute coronary syndrome  Gastrointestinal ROS: No abdominal pain to suggest peptic disease  Musculoskeletal/Extremities ROS: no acute artritis or unusual swelling  Hematologic/Lymphatic ROS: No easy bleeding or unusual lymph node swelling  Neurologic ROS: No new or unusual weakness  Psychiatric ROS: No hallucinations  Allergic/Immunologic: No  urticaria or allergic rash      Current Outpatient Medications    Medication Sig Dispense Refill   • albuterol (PROAIR HFA) 108 (90 Base) MCG/ACT Aero Soln inhalation aerosol USE 2 INHALATIONS EVERY 6 HOURS AS NEEDED FOR SHORTNESS OF BREATH 3 Inhaler 3   • montelukast (SINGULAIR) 10 MG Tab TAKE 1 TABLET DAILY 90 Tab 3   • ferrous sulfate 325 (65 Fe) MG EC tablet Take 1 Tab by mouth 2 times a day, with meals. 180 Tab 3   • simvastatin (ZOCOR) 20 MG Tab TAKE 1 TABLET EVERY EVENING 90 Tab 3   • Estradiol-Norethindrone Acet 0.5-0.1 MG Tab TAKE 1 TABLET DAILY 84 Tab 3   • omeprazole (PRILOSEC) 40 MG delayed-release capsule Take 1 Cap by mouth every day. 90 Cap 3   • diazePAM (VALIUM) 5 MG Tab Take 1 Tab by mouth at bedtime as needed for Sleep (insomnia) for up to 90 days. 90 Tab 0   • cyanocobalamin (VITAMIN B-12) 1000 MCG/ML Solution 1 mL by Intramuscular route every 14 days for 90 days. 6 mL 3   • azithromycin (ZITHROMAX) 250 MG Tab Take 250 mg by mouth.     • metronidazole (METROCREAM) 0.75 % cream      • predniSONE (DELTASONE) 10 MG Tab 4 po every day x 3 d 3 po every day x 3d  2po every day X 3 d 1 po every day X 3 d     • SHINGRIX 50 MCG/0.5ML Recon Susp      • Lifitegrast (XIIDRA) 5 % Solution by Ophthalmic route.     • Fluticasone Furoate-Vilanterol (BREO ELLIPTA) 200-25 MCG/INH AEROSOL POWDER, BREATH ACTIVATED Inhale 1 Puff by mouth every day. Rinse mouth after use. 3 Each 3   • aspirin 81 MG tablet Take 81 mg by mouth.     • ipratropium-albuterol (DUONEB) 0.5-2.5 (3) MG/3ML nebulizer solution 3 mL by Nebulization route every 6 hours as needed for Shortness of Breath. 30 Bullet 4   • acetaminophen (TYLENOL) 325 MG Tab Take 650 mg by mouth.     • Cholecalciferol 1000 units Chew Tab Take 5,000 Units by mouth.     • Flaxseed Oil (LINSEED OIL) Oil 1400mg daily     • Probiotic Product (ALIGN PO) Take  by mouth.     • Nasal Wash (ALKALOL) Solution Spray  in nose.     • GuaiFENesin (MUCINEX PO) Take  by mouth.     • ibuprofen (MOTRIN) 600 MG TABS Take 600 mg by mouth every 6 hours  "as needed.     • Naproxen (NAPROSYN PO) Take  by mouth.       No current facility-administered medications for this visit.        Social History     Tobacco Use   • Smoking status: Never Smoker   • Smokeless tobacco: Never Used   Substance Use Topics   • Alcohol use: No     Alcohol/week: 0.0 oz   • Drug use: No        Past Medical History:   Diagnosis Date   • HLD (hyperlipidemia) 7/7/2015   • Mild intermittent asthma without complication 7/19/2016       Past Surgical History:   Procedure Laterality Date   • KNEE ARTHROSCOPY Right 2015   • LUMPECTOMY Left 4/2012   • BASAL CELL EXCISION  2010    Forehead    • BLEPHAROPLASTY  2010   • SHOULDER ARTHROSCOPY  1992   • HERNIA REPAIR  1990    inguinal    • LAPAROSCOPY  1984    AGA   • PB REMV 2ND CATARACT,CORN-SCLER SECTN         Allergies: Patient has no known allergies.    Family History   Problem Relation Age of Onset   • Heart Attack Mother    • Heart Disease Mother    • Hyperlipidemia Mother    • Hypertension Mother    • Stroke Mother    • Heart Attack Maternal Aunt        Physical Examination    Vitals:    01/23/20 0845   Height: 1.626 m (5' 4\")   Weight: 73.9 kg (163 lb)   Weight % change since last entry.: 0 %   BP: 122/76   Pulse: 84   BMI (Calculated): 27.98       General Appearance: alert, no distress  Skin: Skin color, texture, turgor normal. No rashes or lesions.  Eyes: negative  Oropharynx: Lips, mucosa, and tongue normal. Teeth and gums normal. Oropharynx moist and without lesion  Lungs: positive findings: Remarkably quiet and clear  Heart: negative. RRR without murmur, gallop, or rubs.  No ectopy.  Abdomen: Abdomen soft, non-tender. . No masses,  No organomegaly  Extremities:  No deformities, edema, or skin discoloration  Joints: No acute arthritis  Peripheral Pulses:perfused  Neurologic: intact grossly  No clubbing or cyanosis, no oral thrush    I (soft palate, uvula, fauces, tonsillar pillars visible)    Imaging: None presently    PFTS: Previously " noted      Assessment and Plan  1. Mild intermittent asthma without complication  Excellent control with current regimen    2. Cough  None presently      Followup Return in about 1 year (around 1/23/2021) for follow up visit with Dr. Elizabeth Abdullahi.

## 2020-01-23 NOTE — PATIENT INSTRUCTIONS
This lady comes in today after having hip surgery about 12 weeks ago, the pain medications cause some alterations in her breathing pattern, oxycodone was unpleasant, she thinks gabapentin resulted in a need for her nebulizer, I do not think those were related.    She is doing remarkably well, has not required the steroid Dosepak or the Z-Lalo.  She is well controlled on Breo, I would not change her dose as she is well controlled on the current situation and in addition Singulair and albuterol are available.  She has a nebulizer, can use either albuterol or the combination albuterol ipratropium.    Health maintenance is good body mass index is good she is active lungs are clear vaccinations are all current and she is traveling soon, we will see her in 12 months or as needed    She does see Dr. Richmond, he told her that she did not have asthma, I think what he meant was that she does not have allergic triggered reactive airways but she certainly has infection and irritant related reactive airways with good response to bronchodilators.

## 2020-01-30 ENCOUNTER — APPOINTMENT (RX ONLY)
Dept: URBAN - METROPOLITAN AREA CLINIC 36 | Facility: CLINIC | Age: 70
Setting detail: DERMATOLOGY
End: 2020-01-30

## 2020-01-30 DIAGNOSIS — Z41.9 ENCOUNTER FOR PROCEDURE FOR PURPOSES OTHER THAN REMEDYING HEALTH STATE, UNSPECIFIED: ICD-10-CM

## 2020-01-30 PROCEDURE — ? BOTOX

## 2020-01-30 NOTE — PROCEDURE: BOTOX
Depressor Anguli Oris Units: 0
Show Levator Superior Units: Yes
Show Right And Left Brow Units: No
Additional Area 2 Location: chin
Periorbital Skin Units: 20
Additional Area 1 Units: 4
Forehead Units: 10
Lot #: [id number]
Consent: Written consent obtained. Risks include but not limited to lid/brow ptosis, bruising, swelling, diplopia, temporary effect, incomplete chemical denervation.
Additional Area 1 Location: upper lip
Expiration Date (Month Year): 05/2022
Additional Area 3 Location: masseter
Detail Level: Detailed
Dilution (U/0.1 Cc): 0.6
Post-Care Instructions: Patient instructed to not lie down for 4 hours and limit physical activity for 24 hours. Patient instructed not to travel by airplane for 48 hours.

## 2020-02-13 ENCOUNTER — OFFICE VISIT (OUTPATIENT)
Dept: MEDICAL GROUP | Facility: PHYSICIAN GROUP | Age: 70
End: 2020-02-13
Payer: MEDICARE

## 2020-02-13 VITALS
DIASTOLIC BLOOD PRESSURE: 80 MMHG | SYSTOLIC BLOOD PRESSURE: 118 MMHG | HEART RATE: 68 BPM | RESPIRATION RATE: 14 BRPM | TEMPERATURE: 98.3 F | OXYGEN SATURATION: 98 %

## 2020-02-13 DIAGNOSIS — M54.16 LUMBAR RADICULOPATHY: ICD-10-CM

## 2020-02-13 DIAGNOSIS — G47.09 OTHER INSOMNIA: ICD-10-CM

## 2020-02-13 DIAGNOSIS — D56.3 THALASSEMIA MINOR: ICD-10-CM

## 2020-02-13 DIAGNOSIS — J45.20 MILD INTERMITTENT ASTHMA WITHOUT COMPLICATION: ICD-10-CM

## 2020-02-13 DIAGNOSIS — Z79.890 HORMONE REPLACEMENT THERAPY (POSTMENOPAUSAL): ICD-10-CM

## 2020-02-13 DIAGNOSIS — E61.1 IRON DEFICIENCY: ICD-10-CM

## 2020-02-13 DIAGNOSIS — N60.99 BREAST DUCTAL HYPERPLASIA, ATYPICAL: ICD-10-CM

## 2020-02-13 PROBLEM — J20.9 ACUTE BRONCHITIS: Status: RESOLVED | Noted: 2018-07-06 | Resolved: 2020-02-13

## 2020-02-13 PROCEDURE — 99214 OFFICE O/P EST MOD 30 MIN: CPT | Performed by: FAMILY MEDICINE

## 2020-02-13 RX ORDER — PRAMIPEXOLE DIHYDROCHLORIDE 0.12 MG/1
TABLET ORAL
COMMUNITY
Start: 2020-01-27 | End: 2021-04-04

## 2020-02-13 RX ORDER — TRIAZOLAM 0.12 MG/1
0.12 TABLET ORAL NIGHTLY PRN
Qty: 90 TAB | Refills: 0 | Status: SHIPPED
Start: 2020-02-13 | End: 2020-12-04 | Stop reason: SDUPTHER

## 2020-02-13 RX ORDER — CELECOXIB 200 MG/1
CAPSULE ORAL
COMMUNITY
Start: 2020-01-27 | End: 2021-04-04

## 2020-02-13 ASSESSMENT — PATIENT HEALTH QUESTIONNAIRE - PHQ9: CLINICAL INTERPRETATION OF PHQ2 SCORE: 0

## 2020-02-13 NOTE — PROGRESS NOTES
Chief Complaint   Patient presents with   • Hyperlipidemia     fv labs   • Results     MRI results       HISTORY OF PRESENT ILLNESS: Patient is a 69 y.o. female established patient here today for the following concerns:      Patient here for follow-up on recent lab testing as well as the MRI due to sudden onset of hearing loss.  MRI was to ensure no schwannoma.  No stroke.  This was negative.  Labs are reviewed with the patient with the following concerns:    Thalassemia minor  Iron deficiency  Patient has a history of iron deficiency thalassemia minor and B12 deficiency. She has completed repeat labs prior to her visit today. She states she has previously tried to take iron supplementation, but this caused nausea. She is also receiving B12 injections twice a month now. She has been following up with dermatology in regards to her bruising skin and thinning hair. She continues on PPI therapy inhibiting her absorption.      Ref. Range 11/26/2019 12:00   WBC Latest Ref Range: 4.8 - 10.8 K/uL 5.8   RBC Latest Ref Range: 4.20 - 5.40 M/uL 5.05   Hemoglobin Latest Ref Range: 12.0 - 16.0 g/dL 10.7 (L)   Hematocrit Latest Ref Range: 37.0 - 47.0 % 34.4 (L)   MCV Latest Ref Range: 81.4 - 97.8 fL 68.1 (L)   MCH Latest Ref Range: 27.0 - 33.0 pg 21.2 (L)   MCHC Latest Ref Range: 33.6 - 35.0 g/dL 31.1 (L)   RDW Latest Ref Range: 35.9 - 50.0 fL 39.2   Platelet Count Latest Ref Range: 164 - 446 K/uL 387   MPV Latest Ref Range: 9.0 - 12.9 fL 11.3      Ref. Range 11/26/2019 12:00   Ferritin Latest Ref Range: 10.0 - 291.0 ng/mL 17.6   Vitamin B12 -True Cobalamin Latest Ref Range: 211 - 911 pg/mL 360       Lumbar radiculopathy  Patient has been following up with orthopedics in regards to her lumbar radiculopathy. She has had multiple injections in various joints with improvement of her symptoms. Orthopedics is planning to do back injections to help with her lumbar pain.     Mild intermittent asthma without complication  She continues to  use her Breo Ellipta, Singular, and albuterol rescue inhaler with good results. She also has Duoneb for acute flare-ups. She has previously been evaluated by an allergist.    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 She previously presented with complaints of sudden left sided hearing loss a few months ago around October 2019. Her ENT previously mentioned that this may have been from a TIA. Her MRI showed unremarkable IAC's with no abnormalities to explain her symptoms.     Breast ductal hyperplasia, atypical  Patient had a mammogram in 11/2019 which showed calcification in the superior left MLO view. Diagnostic mammogram and breast ultrasound were then completed in the next month which showed benign-appearing calcifications along the far posterior superior left breast. The ultrasound did not show any focal findings in the areas of concern.     Hormone replacement therapy (postmenopausal)   Patient continues to take HRT. She has a history of severe menopausal symptoms vasomotor and mood. The HRT has worked very well for her, and she has had severe reactions with stopping the treatment. She continues to take  mg.     Other insomnia  She continues to take Triazolam on occasion when she is traveling to help her with sleep. Patient notes that she has difficulty falling asleep while she is out of her own bed. This regimen has been working well for her, and she does not have difficulty stopping the Triazolam once she is done with travel.       Past Medical, Social, and Family history reviewed and updated in EPIC    Allergies:Patient has no known allergies.    Current Outpatient Medications   Medication Sig Dispense Refill   • celecoxib (CELEBREX) 200 MG Cap      • pramipexole (MIRAPEX) 0.125 MG Tab      • Fluticasone Furoate-Vilanterol (BREO ELLIPTA) 200-25 MCG/INH AEROSOL POWDER, BREATH ACTIVATED Inhale 1 Puff by mouth every day. Rinse mouth after use. 3 Each 3   •  montelukast (SINGULAIR) 10 MG Tab TAKE 1 TABLET DAILY 90 Tab 3   • albuterol (PROAIR HFA) 108 (90 Base) MCG/ACT Aero Soln inhalation aerosol USE 2 INHALATIONS EVERY 6 HOURS AS NEEDED FOR SHORTNESS OF BREATH 3 Inhaler 3   • ipratropium-albuterol (DUONEB) 0.5-2.5 (3) MG/3ML nebulizer solution 3 mL by Nebulization route every 6 hours as needed for Shortness of Breath. 30 Bullet 4   • ferrous sulfate 325 (65 Fe) MG EC tablet Take 1 Tab by mouth 2 times a day, with meals. 180 Tab 3   • simvastatin (ZOCOR) 20 MG Tab TAKE 1 TABLET EVERY EVENING 90 Tab 3   • Estradiol-Norethindrone Acet 0.5-0.1 MG Tab TAKE 1 TABLET DAILY 84 Tab 3   • omeprazole (PRILOSEC) 40 MG delayed-release capsule Take 1 Cap by mouth every day. 90 Cap 3   • diazePAM (VALIUM) 5 MG Tab Take 1 Tab by mouth at bedtime as needed for Sleep (insomnia) for up to 90 days. 90 Tab 0   • cyanocobalamin (VITAMIN B-12) 1000 MCG/ML Solution 1 mL by Intramuscular route every 14 days for 90 days. 6 mL 3   • metronidazole (METROCREAM) 0.75 % cream      • predniSONE (DELTASONE) 10 MG Tab 4 po every day x 3 d 3 po every day x 3d  2po every day X 3 d 1 po every day X 3 d     • SHINGRIX 50 MCG/0.5ML Recon Susp      • Lifitegrast (XIIDRA) 5 % Solution by Ophthalmic route.     • aspirin 81 MG tablet Take 81 mg by mouth.     • acetaminophen (TYLENOL) 325 MG Tab Take 650 mg by mouth.     • Cholecalciferol 1000 units Chew Tab Take 5,000 Units by mouth.     • Flaxseed Oil (LINSEED OIL) Oil 1400mg daily     • Probiotic Product (ALIGN PO) Take  by mouth.     • Nasal Wash (ALKALOL) Solution Spray  in nose.     • GuaiFENesin (MUCINEX PO) Take  by mouth.     • ibuprofen (MOTRIN) 600 MG TABS Take 600 mg by mouth every 6 hours as needed.       No current facility-administered medications for this visit.          ROS:  Review of Systems   Constitutional: Negative for fever, chills, weight loss and malaise/fatigue.   HENT: Negative for ear pain, nosebleeds, congestion, sore throat and neck  pain.    Eyes: Negative for blurred vision.   Respiratory: Negative for cough, sputum production, shortness of breath and wheezing.    Cardiovascular: Negative for chest pain, palpitations,  and leg swelling.   Gastrointestinal: Negative for heartburn, nausea, vomiting, diarrhea and abdominal pain.   Genitourinary: Negative for dysuria, urgency and frequency.   Musculoskeletal: Negative for myalgias, back pain and joint pain.   Skin: Negative for rash and itching.   Neurological: Negative for dizziness, tingling, tremors, sensory change, focal weakness and headaches.   Endo/Heme/Allergies: Does not bruise/bleed easily.   Psychiatric/Behavioral: Negative for depression, anxiety, suicidal ideas, insomnia and memory loss.      Exam:  /80   Pulse 68   Temp 36.8 °C (98.3 °F)   Resp 14   SpO2 98%     General:  Well nourished, well developed in NAD  Head is grossly normal.  Neck: Supple without JVD   Pulmonary:  Normal effort.   Cardiovascular: Regular rate  Extremities: no clubbing, cyanosis, or edema.  Psych: affect appropriate      Please note that this dictation was created using voice recognition software. I have made every reasonable attempt to correct obvious errors, but I expect that there are errors of grammar and possibly content that I did not discover before finalizing the note.    Assessment/Plan:    1. Thalassemia minor  2. Iron deficiency  We will reevaluate her iron and CBC. We will monitor this closely at this time and consider starting on a low-dose iron if it continues to trend downwards. Advised iron rich foods.  - CBC WITH DIFFERENTIAL; Future  - IRON/TOTAL IRON BIND; Future  - FERRITIN; Future    3. Lumbar radiculopathy  Stable. Orthopedics is planning lumbar injections.   - Comp Metabolic Panel; Future    4. Mild intermittent asthma without complication  Stable and well-controlled on current inhaler regimen.    5. Breast ductal hyperplasia, atypical  Repeat diagnostic mammogram advised to  be completed by radiology 6 months following the last.     6. Hormone replacement therapy (postmenopausal)  Continue current HRT and ASA regimen.    7. Other insomnia  Well-controlled on current regimen. Refills provided.    - triazolam (HALCION) 0.125 MG tablet; Take 1 Tab by mouth at bedtime as needed for up to 90 days.  Dispense: 90 Tab; Refill: 0     3-6 month       Mark DAY (Scribe), am scribing for, and in the presence of, Arcelia Walton M.D.    Electronically signed by: Mark Velazquez (Scribe), 2/13/2020     IArcelia M.D. personally performed the services described in this documentation, as scribed by Mark Velazquez in my presence, and it is both accurate and complete.

## 2020-02-27 ENCOUNTER — APPOINTMENT (RX ONLY)
Dept: URBAN - METROPOLITAN AREA CLINIC 36 | Facility: CLINIC | Age: 70
Setting detail: DERMATOLOGY
End: 2020-02-27

## 2020-02-27 DIAGNOSIS — Z41.9 ENCOUNTER FOR PROCEDURE FOR PURPOSES OTHER THAN REMEDYING HEALTH STATE, UNSPECIFIED: ICD-10-CM

## 2020-02-27 PROCEDURE — ? FILLERS

## 2020-02-27 NOTE — PROCEDURE: FILLERS
Tomer Mishra Syringe Price (Per 1.0 Cc- Numbers Only No Special Characters Or $): 0.00
Lateral Face Filler Volume In Cc: 0
Filler: Juvederm Ultra XC
Post-Care Instructions: Patient instructed to apply ice to reduce swelling.
Additional Area 1 Location: katherine oral rhythm
Consent: Written consent obtained. Risks include but not limited to bruising, beading, irregular texture, ulceration, infection, allergic reaction, scar formation, incomplete augmentation, temporary nature, procedural pain.
Lot #: [id number]
Additional Area 2 Location: chin
Include Cannula Length?: 1.5 inch
Expiration Date (Month Year): 10/05/2019
Marionette Lines Filler Volume In Cc: 1
Lot #: [id number]
Include Cannula Size?: 27G
Map Statment: See Attach Map for Complete Details
Expiration Date (Month Year): 08/12/2029
Include Cannula Brand?: DermaSculpt
Filler: Juvederm Volbella XC
Use Map Statement For Sites (Optional): No
Include Cannula Information In Note?: Yes
Lot #: [id number]
Detail Level: Detailed
Pricing Information: This plan will add up the cumulative amount of filler you document and will bill based on the unit price noted below. For example if you inject 0.9 ccs of Restylane it will bill for one unit. If you inject 1.3 ccs it will bill for two units.
Expiration Date (Month Year): 09/19/2020

## 2020-03-13 NOTE — TELEPHONE ENCOUNTER
Received fax from [facility] requesting 90 day breo 200     Have we ever prescribed this med? Yes.  If yes, what date? 1/23/20 was sent to [facility]     Last OV: 1/23/2020 RAMONA Lincoln MD     Next OV: 7/28/2020 Dr. Abdullahi     DX: Mild intermittent asthma without complication    Medications: 90 day supply breo 200

## 2020-05-26 ENCOUNTER — HOSPITAL ENCOUNTER (OUTPATIENT)
Dept: LAB | Facility: MEDICAL CENTER | Age: 70
End: 2020-05-26
Attending: FAMILY MEDICINE
Payer: MEDICARE

## 2020-05-26 DIAGNOSIS — E61.1 IRON DEFICIENCY: ICD-10-CM

## 2020-05-26 DIAGNOSIS — M54.16 LUMBAR RADICULOPATHY: ICD-10-CM

## 2020-05-26 DIAGNOSIS — D56.3 THALASSEMIA MINOR: ICD-10-CM

## 2020-05-26 LAB
BASOPHILS # BLD AUTO: 0.6 % (ref 0–1.8)
BASOPHILS # BLD: 0.04 K/UL (ref 0–0.12)
EOSINOPHIL # BLD AUTO: 0.11 K/UL (ref 0–0.51)
EOSINOPHIL NFR BLD: 1.8 % (ref 0–6.9)
ERYTHROCYTE [DISTWIDTH] IN BLOOD BY AUTOMATED COUNT: 40.2 FL (ref 35.9–50)
HCT VFR BLD AUTO: 34.8 % (ref 37–47)
HGB BLD-MCNC: 10.7 G/DL (ref 12–16)
IMM GRANULOCYTES # BLD AUTO: 0.04 K/UL (ref 0–0.11)
IMM GRANULOCYTES NFR BLD AUTO: 0.6 % (ref 0–0.9)
LYMPHOCYTES # BLD AUTO: 1.73 K/UL (ref 1–4.8)
LYMPHOCYTES NFR BLD: 27.6 % (ref 22–41)
MCH RBC QN AUTO: 20.3 PG (ref 27–33)
MCHC RBC AUTO-ENTMCNC: 30.7 G/DL (ref 33.6–35)
MCV RBC AUTO: 65.9 FL (ref 81.4–97.8)
MONOCYTES # BLD AUTO: 0.36 K/UL (ref 0–0.85)
MONOCYTES NFR BLD AUTO: 5.7 % (ref 0–13.4)
NEUTROPHILS # BLD AUTO: 3.99 K/UL (ref 2–7.15)
NEUTROPHILS NFR BLD: 63.7 % (ref 44–72)
NRBC # BLD AUTO: 0 K/UL
NRBC BLD-RTO: 0 /100 WBC
PLATELET # BLD AUTO: 334 K/UL (ref 164–446)
RBC # BLD AUTO: 5.28 M/UL (ref 4.2–5.4)
WBC # BLD AUTO: 6.3 K/UL (ref 4.8–10.8)

## 2020-05-26 PROCEDURE — 36415 COLL VENOUS BLD VENIPUNCTURE: CPT

## 2020-05-26 PROCEDURE — 80053 COMPREHEN METABOLIC PANEL: CPT

## 2020-05-26 PROCEDURE — 83540 ASSAY OF IRON: CPT

## 2020-05-26 PROCEDURE — 85025 COMPLETE CBC W/AUTO DIFF WBC: CPT

## 2020-05-26 PROCEDURE — 83550 IRON BINDING TEST: CPT

## 2020-05-26 PROCEDURE — 82728 ASSAY OF FERRITIN: CPT

## 2020-05-27 LAB
ALBUMIN SERPL BCP-MCNC: 4.5 G/DL (ref 3.2–4.9)
ALBUMIN/GLOB SERPL: 1.9 G/DL
ALP SERPL-CCNC: 68 U/L (ref 30–99)
ALT SERPL-CCNC: 16 U/L (ref 2–50)
ANION GAP SERPL CALC-SCNC: 11 MMOL/L (ref 7–16)
AST SERPL-CCNC: 24 U/L (ref 12–45)
BILIRUB SERPL-MCNC: 0.6 MG/DL (ref 0.1–1.5)
BUN SERPL-MCNC: 16 MG/DL (ref 8–22)
CALCIUM SERPL-MCNC: 9.3 MG/DL (ref 8.5–10.5)
CHLORIDE SERPL-SCNC: 104 MMOL/L (ref 96–112)
CO2 SERPL-SCNC: 26 MMOL/L (ref 20–33)
CREAT SERPL-MCNC: 0.87 MG/DL (ref 0.5–1.4)
FERRITIN SERPL-MCNC: 12.9 NG/ML (ref 10–291)
GLOBULIN SER CALC-MCNC: 2.4 G/DL (ref 1.9–3.5)
GLUCOSE SERPL-MCNC: 84 MG/DL (ref 65–99)
IRON SATN MFR SERPL: 25 % (ref 15–55)
IRON SERPL-MCNC: 95 UG/DL (ref 40–170)
POTASSIUM SERPL-SCNC: 4.3 MMOL/L (ref 3.6–5.5)
PROT SERPL-MCNC: 6.9 G/DL (ref 6–8.2)
SODIUM SERPL-SCNC: 141 MMOL/L (ref 135–145)
TIBC SERPL-MCNC: 386 UG/DL (ref 250–450)
UIBC SERPL-MCNC: 291 UG/DL (ref 110–370)

## 2020-05-29 ENCOUNTER — OFFICE VISIT (OUTPATIENT)
Dept: MEDICAL GROUP | Facility: PHYSICIAN GROUP | Age: 70
End: 2020-05-29
Payer: MEDICARE

## 2020-05-29 VITALS
HEART RATE: 94 BPM | SYSTOLIC BLOOD PRESSURE: 112 MMHG | RESPIRATION RATE: 16 BRPM | DIASTOLIC BLOOD PRESSURE: 76 MMHG | BODY MASS INDEX: 29.02 KG/M2 | TEMPERATURE: 99 F | OXYGEN SATURATION: 95 % | WEIGHT: 170 LBS | HEIGHT: 64 IN

## 2020-05-29 DIAGNOSIS — D50.9 IRON DEFICIENCY ANEMIA, UNSPECIFIED IRON DEFICIENCY ANEMIA TYPE: ICD-10-CM

## 2020-05-29 DIAGNOSIS — D56.3 THALASSEMIA MINOR: ICD-10-CM

## 2020-05-29 DIAGNOSIS — R92.8 ABNORMALITY OF LEFT BREAST ON SCREENING MAMMOGRAM: ICD-10-CM

## 2020-05-29 PROCEDURE — 99214 OFFICE O/P EST MOD 30 MIN: CPT | Performed by: FAMILY MEDICINE

## 2020-05-29 RX ORDER — CYANOCOBALAMIN 1000 UG/ML
INJECTION, SOLUTION INTRAMUSCULAR; SUBCUTANEOUS
COMMUNITY
Start: 2020-04-09 | End: 2020-10-12

## 2020-05-29 RX ORDER — EPINASTINE HCL 0.05 %
DROPS OPHTHALMIC (EYE)
COMMUNITY
Start: 2020-04-01 | End: 2021-04-04

## 2020-05-29 ASSESSMENT — FIBROSIS 4 INDEX: FIB4 SCORE: 1.28

## 2020-05-29 NOTE — PROGRESS NOTES
Chief Complaint   Patient presents with   • Anemia     fv labs       HISTORY OF PRESENT ILLNESS: Patient is a 70 y.o. female established patient here today for the following concerns:    1. Thalassemia minor  2. Iron deficiency anemia, unspecified iron deficiency anemia type  This is a pleasant 70 year old female with hx of iron deficiency anemia and thalassemia minor who had been experiencing increased bruising.  Her H/H has remained stable.  Iron is still a bit low.  She always feels a bit tired.      3. Abnormality of left breast on screening mammogram  In addition, due next month for 6 month recheck on her left breast.  No new lumps, masses, or skin changes noted.       Past Medical, Social, and Family history reviewed and updated in EPIC    Allergies:Patient has no known allergies.    Current Outpatient Medications   Medication Sig Dispense Refill   • ferrous sulfate (FEOSOL) 220 (44 Fe) MG/5ML Elixir Take 5 mL by mouth every day for 30 days. 1 Bottle 11   • Fluticasone Furoate-Vilanterol (BREO ELLIPTA) 200-25 MCG/INH AEROSOL POWDER, BREATH ACTIVATED Inhale 1 Puff by mouth every day. Rinse mouth after use. 3 Each 3   • celecoxib (CELEBREX) 200 MG Cap      • pramipexole (MIRAPEX) 0.125 MG Tab      • montelukast (SINGULAIR) 10 MG Tab TAKE 1 TABLET DAILY 90 Tab 3   • albuterol (PROAIR HFA) 108 (90 Base) MCG/ACT Aero Soln inhalation aerosol USE 2 INHALATIONS EVERY 6 HOURS AS NEEDED FOR SHORTNESS OF BREATH 3 Inhaler 3   • ipratropium-albuterol (DUONEB) 0.5-2.5 (3) MG/3ML nebulizer solution 3 mL by Nebulization route every 6 hours as needed for Shortness of Breath. 30 Bullet 4   • simvastatin (ZOCOR) 20 MG Tab TAKE 1 TABLET EVERY EVENING 90 Tab 3   • Estradiol-Norethindrone Acet 0.5-0.1 MG Tab TAKE 1 TABLET DAILY 84 Tab 3   • omeprazole (PRILOSEC) 40 MG delayed-release capsule Take 1 Cap by mouth every day. 90 Cap 3   • metronidazole (METROCREAM) 0.75 % cream      • predniSONE (DELTASONE) 10 MG Tab 4 po every day x 3  "d 3 po every day x 3d  2po every day X 3 d 1 po every day X 3 d     • Lifitegrast (XIIDRA) 5 % Solution by Ophthalmic route.     • aspirin 81 MG tablet Take 81 mg by mouth.     • acetaminophen (TYLENOL) 325 MG Tab Take 650 mg by mouth.     • Cholecalciferol 1000 units Chew Tab Take 5,000 Units by mouth.     • Flaxseed Oil (LINSEED OIL) Oil 1400mg daily     • Probiotic Product (ALIGN PO) Take  by mouth.     • Nasal Wash (ALKALOL) Solution Spray  in nose.     • GuaiFENesin (MUCINEX PO) Take  by mouth.     • ibuprofen (MOTRIN) 600 MG TABS Take 600 mg by mouth every 6 hours as needed.     • Epinastine HCl 0.05 % Solution      • SHINGRIX 50 MCG/0.5ML Recon Susp        No current facility-administered medications for this visit.          ROS:  Review of Systems   Constitutional: Negative for fever, chills, weight loss and malaise/fatigue.   HENT: Negative for ear pain, nosebleeds, congestion, sore throat and neck pain.    Eyes: Negative for blurred vision.   Respiratory: Negative for cough, sputum production, shortness of breath and wheezing.    Cardiovascular: Negative for chest pain, palpitations,  and leg swelling.   Gastrointestinal: Negative for heartburn, nausea, vomiting, diarrhea and abdominal pain.   Genitourinary: Negative for dysuria, urgency and frequency.   Musculoskeletal: Negative for myalgias, back pain and joint pain.   Skin: Negative for rash and itching.   Neurological: Negative for dizziness, tingling, tremors, sensory change, focal weakness and headaches.   Endo/Heme/Allergies: Does not bruise/bleed easily.   Psychiatric/Behavioral: Negative for depression, anxiety, suicidal ideas, insomnia and memory loss.      Exam:  /76   Pulse 94   Temp 37.2 °C (99 °F)   Resp 16   Ht 1.626 m (5' 4\")   Wt 77.1 kg (170 lb)   SpO2 95%     General:  Well nourished, well developed in NAD  Head is grossly normal.  Neck: Supple without JVD   Pulmonary:  Normal effort.   Cardiovascular: Regular " rate  Extremities: no clubbing, cyanosis, or edema.  Psych: affect appropriate      Please note that this dictation was created using voice recognition software. I have made every reasonable attempt to correct obvious errors, but I expect that there are errors of grammar and possibly content that I did not discover before finalizing the note.    Assessment/Plan:  1. Thalassemia minor  - ferrous sulfate (FEOSOL) 220 (44 Fe) MG/5ML Elixir; Take 5 mL by mouth every day for 30 days.  Dispense: 1 Bottle; Refill: 11  - CBC WITH DIFFERENTIAL; Future  - FERRITIN; Future    2. Iron deficiency anemia, unspecified iron deficiency anemia type  - ferrous sulfate (FEOSOL) 220 (44 Fe) MG/5ML Elixir; Take 5 mL by mouth every day for 30 days.  Dispense: 1 Bottle; Refill: 11  - CBC WITH DIFFERENTIAL; Future  - FERRITIN; Future    3. Abnormality of left breast on screening mammogram  - MA DIAGNOSTIC MAMMO LEFT W/CAD; Future    6 month follow up, sooner prn.

## 2020-06-09 ENCOUNTER — HOSPITAL ENCOUNTER (OUTPATIENT)
Dept: RADIOLOGY | Facility: MEDICAL CENTER | Age: 70
End: 2020-06-09
Attending: FAMILY MEDICINE
Payer: MEDICARE

## 2020-06-09 DIAGNOSIS — R92.8 ABNORMAL FINDING ON BREAST IMAGING: ICD-10-CM

## 2020-06-09 PROCEDURE — G0279 TOMOSYNTHESIS, MAMMO: HCPCS | Mod: LT

## 2020-06-17 ENCOUNTER — APPOINTMENT (RX ONLY)
Dept: URBAN - METROPOLITAN AREA CLINIC 36 | Facility: CLINIC | Age: 70
Setting detail: DERMATOLOGY
End: 2020-06-17

## 2020-06-17 DIAGNOSIS — Z41.9 ENCOUNTER FOR PROCEDURE FOR PURPOSES OTHER THAN REMEDYING HEALTH STATE, UNSPECIFIED: ICD-10-CM

## 2020-06-17 PROCEDURE — ? JUVEDERM ULTRA XC INJECTION

## 2020-06-17 NOTE — PROCEDURE: JUVEDERM ULTRA XC INJECTION
Lateral Face Filler Volume In Cc: 0
Use Map Statement For Sites (Optional): No
Consent: Written consent obtained. Risks include but not limited to bruising, beading, irregular texture, ulceration, infection, allergic reaction, scar formation, incomplete augmentation, temporary nature, procedural pain.
Vermilion Lips Filler Volume In Cc: 0.1
Number Of Syringes (Required For Inventory): 1
Post-Care Instructions: Patient instructed to apply ice to reduce swelling.
Expiration Date (Month Year): 11/26/2020
Marionette Lines Filler Volume In Cc: 0.2
Procedural Text: The filler was administered to the treatment areas noted above.
Filler: Juvederm Ultra XC
Lot #: [id number]
Detail Level: Detailed
Additional Anesthesia Volume In Cc: 6
Map Statment: See Attach Map for Complete Details
Nasolabial Folds Filler Volume In Cc: 0.7

## 2020-08-17 ENCOUNTER — OFFICE VISIT (OUTPATIENT)
Dept: PULMONOLOGY | Facility: HOSPICE | Age: 70
End: 2020-08-17
Payer: MEDICARE

## 2020-08-17 VITALS
HEART RATE: 82 BPM | RESPIRATION RATE: 16 BRPM | HEIGHT: 65 IN | BODY MASS INDEX: 27.63 KG/M2 | WEIGHT: 165.8 LBS | DIASTOLIC BLOOD PRESSURE: 70 MMHG | SYSTOLIC BLOOD PRESSURE: 122 MMHG | OXYGEN SATURATION: 97 %

## 2020-08-17 DIAGNOSIS — K21.9 GASTROESOPHAGEAL REFLUX DISEASE, ESOPHAGITIS PRESENCE NOT SPECIFIED: ICD-10-CM

## 2020-08-17 DIAGNOSIS — R09.82 POSTNASAL DRIP: ICD-10-CM

## 2020-08-17 DIAGNOSIS — J45.20 MILD INTERMITTENT ASTHMA WITHOUT COMPLICATION: ICD-10-CM

## 2020-08-17 PROCEDURE — 99214 OFFICE O/P EST MOD 30 MIN: CPT | Performed by: INTERNAL MEDICINE

## 2020-08-17 ASSESSMENT — FIBROSIS 4 INDEX: FIB4 SCORE: 1.28

## 2020-08-17 NOTE — PROGRESS NOTES
Jacinda Patiño is a 70 y.o. female here for reactive airways on maintenance inhaler. Patient was referred by primary care.    History of Present Illness:      Carolina comes in today to follow-up on her reactive airways, had a fairly significant bout of congestion in June and July.  There is always been some question about seasonality, and possible allergies, but presently she has resolution of those symptoms, is on Breo and Singulair and the Proventil rescue inhaler.  She does use the rescue inhaler preexercise.    She has some very minor bruising on her arms, tiny areas, I do not think these are related to the inhaled Breo.  She will continue on the current regimen, and she has follow-up with allergy as well.  Flu vaccine is recommended in the fall, she has had Prevnar.  Assuming the best we can check her again in 6 months, but sooner if problems occur    Constitutional ROS: No unexpected change in weight, No unexplained fevers  Eyes: No change in vision or blurring or double vision  Mouth/Throat ROS: No sore throat, No recent change in voice or hoarseness  Pulmonary ROS: See present history for pertinent positives  Cardiovascular ROS: No chest pain to suggest acute coronary syndrome  Gastrointestinal ROS: No abdominal pain to suggest peptic disease  Musculoskeletal/Extremities ROS: no acute artritis or unusual swelling  Hematologic/Lymphatic ROS: No easy bleeding or unusual lymph node swelling  Neurologic ROS: No new or unusual weakness  Psychiatric ROS: No hallucinations  Allergic/Immunologic: No  urticaria or allergic rash      Current Outpatient Medications   Medication Sig Dispense Refill   • Ferrous Sulfate 5 MG/20ML Liquid Take 5 mg by mouth every day.     • Epinastine HCl 0.05 % Solution      • cyanocobalamin (VITAMIN B-12) 1000 MCG/ML Solution      • Fluticasone Furoate-Vilanterol (BREO ELLIPTA) 200-25 MCG/INH AEROSOL POWDER, BREATH ACTIVATED Inhale 1 Puff by mouth every day. Rinse mouth after use. 3  Each 3   • celecoxib (CELEBREX) 200 MG Cap      • montelukast (SINGULAIR) 10 MG Tab TAKE 1 TABLET DAILY 90 Tab 3   • albuterol (PROAIR HFA) 108 (90 Base) MCG/ACT Aero Soln inhalation aerosol USE 2 INHALATIONS EVERY 6 HOURS AS NEEDED FOR SHORTNESS OF BREATH 3 Inhaler 3   • ipratropium-albuterol (DUONEB) 0.5-2.5 (3) MG/3ML nebulizer solution 3 mL by Nebulization route every 6 hours as needed for Shortness of Breath. 30 Bullet 4   • simvastatin (ZOCOR) 20 MG Tab TAKE 1 TABLET EVERY EVENING 90 Tab 3   • Estradiol-Norethindrone Acet 0.5-0.1 MG Tab TAKE 1 TABLET DAILY 84 Tab 3   • omeprazole (PRILOSEC) 40 MG delayed-release capsule Take 1 Cap by mouth every day. 90 Cap 3   • metronidazole (METROCREAM) 0.75 % cream      • predniSONE (DELTASONE) 10 MG Tab 4 po every day x 3 d 3 po every day x 3d  2po every day X 3 d 1 po every day X 3 d     • SHINGRIX 50 MCG/0.5ML Recon Susp      • Lifitegrast (XIIDRA) 5 % Solution by Ophthalmic route.     • aspirin 81 MG tablet Take 81 mg by mouth.     • acetaminophen (TYLENOL) 325 MG Tab Take 650 mg by mouth.     • Cholecalciferol 1000 units Chew Tab Take 5,000 Units by mouth.     • Flaxseed Oil (LINSEED OIL) Oil 1400mg daily     • Probiotic Product (ALIGN PO) Take  by mouth.     • Nasal Wash (ALKALOL) Solution Spray  in nose.     • GuaiFENesin (MUCINEX PO) Take  by mouth.     • ibuprofen (MOTRIN) 600 MG TABS Take 600 mg by mouth every 6 hours as needed.     • Fluticasone Furoate-Vilanterol (BREO) 100-25 MCG/INH AEROSOL POWDER, BREATH ACTIVATED Inhale  by mouth.     • pramipexole (MIRAPEX) 0.125 MG Tab        No current facility-administered medications for this visit.        Social History     Tobacco Use   • Smoking status: Never Smoker   • Smokeless tobacco: Never Used   Substance Use Topics   • Alcohol use: No     Alcohol/week: 0.0 oz   • Drug use: No        Past Medical History:   Diagnosis Date   • HLD (hyperlipidemia) 7/7/2015   • Mild intermittent asthma without complication  "7/19/2016       Past Surgical History:   Procedure Laterality Date   • KNEE ARTHROSCOPY Right 2015   • LUMPECTOMY Left 4/2012   • BASAL CELL EXCISION  2010    Forehead    • BLEPHAROPLASTY  2010   • SHOULDER ARTHROSCOPY  1992   • HERNIA REPAIR  1990    inguinal    • LAPAROSCOPY  1984    AGA   • PB REMV 2ND CATARACT,CORN-SCLER SECTN         Allergies: Patient has no known allergies.    Family History   Problem Relation Age of Onset   • Heart Attack Mother    • Heart Disease Mother    • Hyperlipidemia Mother    • Hypertension Mother    • Stroke Mother    • Heart Attack Maternal Aunt        Physical Examination    Vitals:    08/17/20 1453 08/17/20 1458   Height: 1.638 m (5' 4.5\")    Weight: 75.2 kg (165 lb 12.8 oz)    Weight % change since last entry.: 0 %    BP: 122/70    Pulse: 82    BMI (Calculated): 28.02    Resp: 16    O2 sat % room air:  97 %       General Appearance: alert, no distress  Skin: Skin color, texture, turgor normal. No rashes or lesions.  Eyes: negative  Oropharynx: Lips, mucosa, and tongue normal. Teeth and gums normal. Oropharynx moist and without lesion  Lungs: positive findings: Quiet and clear  Heart: negative. RRR without murmur, gallop, or rubs.  No ectopy.  Abdomen: Abdomen soft, non-tender. . No masses,  No organomegaly  Extremities:  No deformities, edema, or skin discoloration  Joints: No acute arthritis  Peripheral Pulses:perfused  Neurologic: intact grossly  No clubbing    Imaging: none    PFTS: None today      Assessment and Plan  1. Mild intermittent asthma without complication  Breo Singulair and occasional rescue    2. Post-nasal drip  Good response to local treatments    3. Gastroesophageal reflux disease, esophagitis presence not specified  No symptoms presently    Followup Return in about 6 months (around 2/17/2021) for follow up visit with Dr. Elizabeth Abdullahi.    "

## 2020-08-17 NOTE — PATIENT INSTRUCTIONS
Carolina comes in today to follow-up on her reactive airways, had a fairly significant bout of congestion in June and July.  There is always been some question about seasonality, and possible allergies, but presently she has resolution of those symptoms, is on Breo and Singulair and the Proventil rescue inhaler.  She does use the rescue inhaler preexercise.    She has some very minor bruising on her arms, tiny areas, I do not think these are related to the inhaled Breo.  She will continue on the current regimen, and she has follow-up with allergy as well.  Flu vaccine is recommended in the fall, she has had Prevnar.  Assuming the best we can check her again in 6 months, but sooner if problems occur

## 2020-09-10 ENCOUNTER — TELEPHONE (OUTPATIENT)
Dept: MEDICAL GROUP | Facility: PHYSICIAN GROUP | Age: 70
End: 2020-09-10

## 2020-09-10 DIAGNOSIS — Z71.84 COUNSELING FOR TRAVEL: ICD-10-CM

## 2020-09-10 NOTE — TELEPHONE ENCOUNTER
----- Message from Jacinda Patiño sent at 9/10/2020  7:11 AM PDT -----  Regarding: Non-Urgent Medical Question  Contact: [phone]  Dear Dr. Walton,  Is it possible to get a referral for a Covid test?  We are hoping to visit a relative in [place] and apparently we are required to show evidence of a negative Covid test.  Thank you for your consideration.

## 2020-09-17 ENCOUNTER — APPOINTMENT (RX ONLY)
Dept: URBAN - METROPOLITAN AREA CLINIC 36 | Facility: CLINIC | Age: 70
Setting detail: DERMATOLOGY
End: 2020-09-17

## 2020-09-17 DIAGNOSIS — Z41.9 ENCOUNTER FOR PROCEDURE FOR PURPOSES OTHER THAN REMEDYING HEALTH STATE, UNSPECIFIED: ICD-10-CM

## 2020-09-17 PROCEDURE — ? JUVEDERM VOLBELLA INJECTION

## 2020-09-17 PROCEDURE — ? JUVEDERM ULTRA XC INJECTION

## 2020-09-17 NOTE — PROCEDURE: JUVEDERM VOLBELLA INJECTION
Vermilion Lips Filler Volume In Cc: 0.5
Additional Area 5 Volume In Cc: 0
Consent: Written consent obtained. Risks include but not limited to bruising, beading, irregular texture, ulceration, infection, allergic reaction, scar formation, incomplete augmentation, temporary nature, procedural pain.
Number Of Syringes (Required For Inventory): 1
Map Statment: See Attach Map for Complete Details
Use Map Statement For Sites (Optional): No
Filler: Juvederm Volbella XC
Procedural Text: The filler was administered to the treatment areas noted above.
Expiration Date (Month Year): 12/27/2021
Lot #: [id number]
Post-Care Instructions: Patient instructed to apply ice to reduce swelling.
Additional Area 1 Location: chin
Additional Anesthesia Volume In Cc: 6
Detail Level: Detailed
Anesthesia Type: 1% lidocaine with epinephrine

## 2020-09-17 NOTE — PROCEDURE: JUVEDERM ULTRA XC INJECTION
Filler: Juvederm Ultra XC
Decollete Filler Volume In Cc: 0
Nasolabial Folds Filler Volume In Cc: 0.7
Additional Anesthesia Volume In Cc: 6
Procedural Text: The filler was administered to the treatment areas noted above.
Expiration Date (Month Year): 06/28/2021
Price (Use Numbers Only, No Special Characters Or $): 600.00
Number Of Syringes (Required For Inventory): 1
Consent: Written consent obtained. Risks include but not limited to bruising, beading, irregular texture, ulceration, infection, allergic reaction, scar formation, incomplete augmentation, temporary nature, procedural pain.
Map Statment: See Attach Map for Complete Details
Marionette Lines Filler Volume In Cc: 0.3
Include Cannula Information In Note?: No
Detail Level: Detailed
Lot #: [id number]
Post-Care Instructions: Patient instructed to apply ice to reduce swelling.

## 2020-10-01 ENCOUNTER — HOSPITAL ENCOUNTER (OUTPATIENT)
Dept: LAB | Facility: MEDICAL CENTER | Age: 70
End: 2020-10-01
Attending: FAMILY MEDICINE
Payer: MEDICARE

## 2020-10-01 DIAGNOSIS — D56.3 THALASSEMIA MINOR: ICD-10-CM

## 2020-10-01 DIAGNOSIS — D50.9 IRON DEFICIENCY ANEMIA, UNSPECIFIED IRON DEFICIENCY ANEMIA TYPE: ICD-10-CM

## 2020-10-01 LAB
BASOPHILS # BLD AUTO: 0.6 % (ref 0–1.8)
BASOPHILS # BLD: 0.04 K/UL (ref 0–0.12)
EOSINOPHIL # BLD AUTO: 0.11 K/UL (ref 0–0.51)
EOSINOPHIL NFR BLD: 1.7 % (ref 0–6.9)
ERYTHROCYTE [DISTWIDTH] IN BLOOD BY AUTOMATED COUNT: 41 FL (ref 35.9–50)
FERRITIN SERPL-MCNC: 58.4 NG/ML (ref 10–291)
HCT VFR BLD AUTO: 37.3 % (ref 37–47)
HGB BLD-MCNC: 11.8 G/DL (ref 12–16)
IMM GRANULOCYTES # BLD AUTO: 0.03 K/UL (ref 0–0.11)
IMM GRANULOCYTES NFR BLD AUTO: 0.5 % (ref 0–0.9)
LYMPHOCYTES # BLD AUTO: 1.49 K/UL (ref 1–4.8)
LYMPHOCYTES NFR BLD: 23 % (ref 22–41)
MCH RBC QN AUTO: 21.5 PG (ref 27–33)
MCHC RBC AUTO-ENTMCNC: 31.6 G/DL (ref 33.6–35)
MCV RBC AUTO: 67.9 FL (ref 81.4–97.8)
MONOCYTES # BLD AUTO: 0.4 K/UL (ref 0–0.85)
MONOCYTES NFR BLD AUTO: 6.2 % (ref 0–13.4)
NEUTROPHILS # BLD AUTO: 4.41 K/UL (ref 2–7.15)
NEUTROPHILS NFR BLD: 68 % (ref 44–72)
NRBC # BLD AUTO: 0 K/UL
NRBC BLD-RTO: 0 /100 WBC
PLATELET # BLD AUTO: 326 K/UL (ref 164–446)
PMV BLD AUTO: 12 FL (ref 9–12.9)
RBC # BLD AUTO: 5.49 M/UL (ref 4.2–5.4)
WBC # BLD AUTO: 6.5 K/UL (ref 4.8–10.8)

## 2020-10-01 PROCEDURE — 82728 ASSAY OF FERRITIN: CPT

## 2020-10-01 PROCEDURE — 85025 COMPLETE CBC W/AUTO DIFF WBC: CPT

## 2020-10-01 PROCEDURE — 36415 COLL VENOUS BLD VENIPUNCTURE: CPT

## 2020-10-12 DIAGNOSIS — E53.8 B12 DEFICIENCY: ICD-10-CM

## 2020-10-12 RX ORDER — CYANOCOBALAMIN 1000 UG/ML
1000 INJECTION, SOLUTION INTRAMUSCULAR; SUBCUTANEOUS
Qty: 6 ML | Refills: 0 | Status: SHIPPED | OUTPATIENT
Start: 2020-10-12 | End: 2020-12-04 | Stop reason: SDUPTHER

## 2020-10-27 DIAGNOSIS — Z79.890 HORMONE REPLACEMENT THERAPY (POSTMENOPAUSAL): ICD-10-CM

## 2020-10-27 RX ORDER — ESTRADIOL AND NORETHINDRONE ACETATE .5; .1 MG/1; MG/1
TABLET ORAL
Qty: 84 TAB | Refills: 0 | Status: SHIPPED | OUTPATIENT
Start: 2020-10-27 | End: 2020-12-04 | Stop reason: SDUPTHER

## 2020-11-05 ENCOUNTER — APPOINTMENT (RX ONLY)
Dept: URBAN - METROPOLITAN AREA CLINIC 36 | Facility: CLINIC | Age: 70
Setting detail: DERMATOLOGY
End: 2020-11-05

## 2020-11-05 DIAGNOSIS — Z41.9 ENCOUNTER FOR PROCEDURE FOR PURPOSES OTHER THAN REMEDYING HEALTH STATE, UNSPECIFIED: ICD-10-CM

## 2020-11-05 PROCEDURE — ? JUVEDERM VOLBELLA INJECTION

## 2020-11-05 NOTE — PROCEDURE: JUVEDERM VOLBELLA INJECTION
Additional Anesthesia Volume In Cc: 6
Final Anesthesia Post-op Assessment    Patient: Marvel Armando  Procedure(s) Performed: RIGHT 3RD CLAWTOE CORRECTION WITH PIP RESECTION ARTHROPLASTY, RIGHT 5TH METATARSAL HEAD RESECTION,  RIGHT TENDO-ACHILLES LENGTHENING  - RIGHTRIGHT 3RD CLAWTOE CORRECTION WITH PIP RESECTION ARTHROPLASTY, RIGHT 5TH METATARSAL HEAD RESECTION,  RIGHT TENDO-ACHILLES LENGTHENING  - RIGHT  Anesthesia type: General    Vitals Value Taken Time   Temp 98.2 5/6/2020  9:26 AM   Pulse 86 5/6/2020  8:45 AM   Resp 14 5/6/2020  8:45 AM   SpO2 93 % 5/6/2020  8:45 AM   /71 5/6/2020  8:45 AM       Last 24 I/O:     Intake/Output Summary (Last 24 hours) at 5/6/2020 0934  Last data filed at 5/6/2020 0735  Gross per 24 hour   Intake 800 ml   Output --   Net 800 ml         Patient Location: Phase II  Post-op Vital Signs:stable  Level of Consciousness: awake, oriented, alert and participates in exam  Respiratory Status: spontaneous ventilation and unassisted  Cardiovascular blood pressure returned to baseline and stable  Hydration: euvolemic  Pain Management: well controlled  Handoff: Handoff to receiving clinician was performed and questions were answered  Nausea: None  Airway Patency:patent  Post-op Assessment: awake, alert, appropriately conversant, or baseline, no complications, patient tolerated procedure well with no complications and evidence of recall  Comments: Procedure tolerated well, no anesthetic complications. Pain is well controlled, no evidence of recall, ponv, or ocular or dental trauma.  VSS.  Orders written and report given to PACU RN.        
Lateral Face Filler Volume In Cc: 0
Additional Area 1 Location: chin
Nasolabial Folds Filler Volume In Cc: 0.3
Procedural Text: The filler was administered to the treatment areas noted above.
Expiration Date (Month Year): 12/27/2020
Map Statment: See Attach Map for Complete Details
Vermilion Lips Filler Volume In Cc: 0.2
Consent: Written consent obtained. Risks include but not limited to bruising, beading, irregular texture, ulceration, infection, allergic reaction, scar formation, incomplete augmentation, temporary nature, procedural pain.
Number Of Syringes (Required For Inventory): 1
Include Cannula Information In Note?: No
Lot #: [id number]
Additional Area 2 Location: philtrum
Detail Level: Detailed
Post-Care Instructions: Patient instructed to apply ice to reduce swelling.
Anesthesia Type: 1% lidocaine with epinephrine
Additional Area 2 Volume In Cc: 0.1
Filler: Juvederm Volbella XC

## 2020-11-15 DIAGNOSIS — J45.20 MILD INTERMITTENT ASTHMA WITHOUT COMPLICATION: ICD-10-CM

## 2020-11-16 RX ORDER — ALBUTEROL SULFATE 90 UG/1
AEROSOL, METERED RESPIRATORY (INHALATION)
Qty: 25.5 G | Refills: 3 | Status: SHIPPED | OUTPATIENT
Start: 2020-11-16 | End: 2021-08-23 | Stop reason: SDUPTHER

## 2020-11-16 NOTE — TELEPHONE ENCOUNTER
Have we ever prescribed this med? Yes.  If yes, what date? 01/23/20    Last OV: 08/17/20 with Dr Abdullahi    Next OV: No Pending appt.     DX: Mild intermittent asthma without complication (J45.20)    Medications:   Requested Prescriptions     Pending Prescriptions Disp Refills   • albuterol (PROAIR HFA) 108 (90 Base) MCG/ACT Aero Soln inhalation aerosol [Pharmacy Med Name: PROAIR HFA INH 8.5GM W/COUNT 90MCG] 25.5 g 3     Sig: USE 2 INHALATIONS EVERY 6 HOURS AS NEEDED FOR SHORTNESS OF BREATH

## 2020-12-02 ENCOUNTER — TELEPHONE (OUTPATIENT)
Dept: MEDICAL GROUP | Facility: PHYSICIAN GROUP | Age: 70
End: 2020-12-02

## 2020-12-02 RX ORDER — NAPROXEN 250 MG/1
500 TABLET ORAL PRN
COMMUNITY
End: 2021-08-11

## 2020-12-02 SDOH — HEALTH STABILITY: MENTAL HEALTH: HOW OFTEN DO YOU HAVE A DRINK CONTAINING ALCOHOL?: NEVER

## 2020-12-02 SDOH — HEALTH STABILITY: MENTAL HEALTH: HOW OFTEN DO YOU HAVE 6 OR MORE DRINKS ON ONE OCCASION?: NEVER

## 2020-12-02 NOTE — TELEPHONE ENCOUNTER
Future Appointments       Provider Department Center    12/4/2020 1:00 PM Arcelia Walton M.D.; [facility]  [facility]        ANNUAL WELLNESS VISIT PRE-VISIT PLANNING WITHOUT OUTREACH    1.  Reviewed note from last office visit with PCP: YES    2.  If any orders were placed at last visit, do we have Results/Consult Notes?        •  Labs - Labs ordered, completed on 10/01/2020 and results are in chart.       •  Imaging - Imaging was not ordered at last office visit.       •  Referrals - No referrals were ordered at last office visit.    3.  Immunizations were updated in [employer] using Web[employer]?: Yes       •  Web[employer] Recommendations: TD and CPOX        •  Is patient due for Tdap? NO       •  Is patient due for Shingrix? NO     4.  Patient is due for the following Health Maintenance Topics:   Health Maintenance Due   Topic Date Due   • Annual Wellness Visit  06/08/2017   • COLONOSCOPY  07/20/2020   • BONE DENSITY  08/03/2020   • MAMMOGRAM  12/17/2020     5.  Reviewed/Updated the following with patient:       •   Preferred Pharmacy? NO       •   Preferred Lab? NO       •   Preferred Communication? NO       •   Allergies? NO       •   Medications? NO       •   Social History? NO       •   Family History (document living status of immediate family members and if + hx of  cancer, diabetes, hypertension, hyperlipidemia, heart attack, stroke) NO    6.  Care Team Updated:       •   DME Company (gait device, O2, CPAP, etc.): NO       •   Other Specialists (eye doctor, derm, GYN, cardiology, endo, etc): YES    7. Orders for overdue Health Maintenance topics pended in Pre-Charting? NO    8.  Patient has the following Care Path diagnoses on Problem List:  NONE    9.  Patient was advised: “This is a free wellness visit. The provider will screen for medical conditions to help you stay healthy. If you have other concerns to address you may be asked to discuss these at a separate visit or there may be an  additional fee.”     10.  Patient was informed to arrive 15 min prior to their scheduled appointment and bring in their medication bottles.

## 2020-12-04 ENCOUNTER — OFFICE VISIT (OUTPATIENT)
Dept: MEDICAL GROUP | Facility: PHYSICIAN GROUP | Age: 70
End: 2020-12-04
Payer: MEDICARE

## 2020-12-04 VITALS
OXYGEN SATURATION: 96 % | RESPIRATION RATE: 18 BRPM | TEMPERATURE: 98.4 F | SYSTOLIC BLOOD PRESSURE: 120 MMHG | HEART RATE: 69 BPM | BODY MASS INDEX: 26.09 KG/M2 | DIASTOLIC BLOOD PRESSURE: 68 MMHG | WEIGHT: 156.6 LBS | HEIGHT: 65 IN

## 2020-12-04 DIAGNOSIS — N60.99 BREAST DUCTAL HYPERPLASIA, ATYPICAL: ICD-10-CM

## 2020-12-04 DIAGNOSIS — E78.5 HYPERLIPIDEMIA, UNSPECIFIED HYPERLIPIDEMIA TYPE: ICD-10-CM

## 2020-12-04 DIAGNOSIS — Z79.890 HORMONE REPLACEMENT THERAPY (POSTMENOPAUSAL): ICD-10-CM

## 2020-12-04 DIAGNOSIS — M81.0 POSTMENOPAUSAL BONE LOSS: ICD-10-CM

## 2020-12-04 DIAGNOSIS — G47.09 OTHER INSOMNIA: ICD-10-CM

## 2020-12-04 DIAGNOSIS — Z00.00 MEDICARE ANNUAL WELLNESS VISIT, SUBSEQUENT: Primary | ICD-10-CM

## 2020-12-04 DIAGNOSIS — Z12.31 ENCOUNTER FOR SCREENING MAMMOGRAM FOR MALIGNANT NEOPLASM OF BREAST: ICD-10-CM

## 2020-12-04 DIAGNOSIS — K21.9 GASTROESOPHAGEAL REFLUX DISEASE, UNSPECIFIED WHETHER ESOPHAGITIS PRESENT: ICD-10-CM

## 2020-12-04 DIAGNOSIS — D50.9 IRON DEFICIENCY ANEMIA, UNSPECIFIED IRON DEFICIENCY ANEMIA TYPE: ICD-10-CM

## 2020-12-04 DIAGNOSIS — E53.8 B12 DEFICIENCY: ICD-10-CM

## 2020-12-04 PROCEDURE — 99999 PR NO CHARGE: CPT | Performed by: FAMILY MEDICINE

## 2020-12-04 RX ORDER — SIMVASTATIN 20 MG
TABLET ORAL
Qty: 90 TAB | Refills: 3 | Status: SHIPPED | OUTPATIENT
Start: 2020-12-04

## 2020-12-04 RX ORDER — ESTRADIOL AND NORETHINDRONE ACETATE .5; .1 MG/1; MG/1
TABLET ORAL
Qty: 90 TAB | Refills: 3 | Status: SHIPPED | OUTPATIENT
Start: 2020-12-04 | End: 2023-10-11

## 2020-12-04 RX ORDER — CYANOCOBALAMIN 1000 UG/ML
1000 INJECTION, SOLUTION INTRAMUSCULAR; SUBCUTANEOUS
Qty: 6 ML | Refills: 3 | Status: SHIPPED | OUTPATIENT
Start: 2020-12-04 | End: 2021-03-04

## 2020-12-04 RX ORDER — OMEPRAZOLE 40 MG/1
40 CAPSULE, DELAYED RELEASE ORAL DAILY
Qty: 90 CAP | Refills: 3 | Status: SHIPPED | OUTPATIENT
Start: 2020-12-04

## 2020-12-04 RX ORDER — ALBUTEROL SULFATE 2.5 MG/3ML
SOLUTION RESPIRATORY (INHALATION)
COMMUNITY
Start: 2020-12-01 | End: 2020-12-03

## 2020-12-04 RX ORDER — TRIAZOLAM 0.12 MG/1
0.12 TABLET ORAL NIGHTLY PRN
Qty: 90 TAB | Refills: 1 | Status: SHIPPED | OUTPATIENT
Start: 2020-12-04 | End: 2021-03-04

## 2020-12-04 ASSESSMENT — PATIENT HEALTH QUESTIONNAIRE - PHQ9: CLINICAL INTERPRETATION OF PHQ2 SCORE: 0

## 2020-12-04 ASSESSMENT — FIBROSIS 4 INDEX: FIB4 SCORE: 1.31

## 2020-12-04 ASSESSMENT — ACTIVITIES OF DAILY LIVING (ADL): BATHING_REQUIRES_ASSISTANCE: 0

## 2020-12-04 ASSESSMENT — ENCOUNTER SYMPTOMS: GENERAL WELL-BEING: GOOD

## 2020-12-04 NOTE — PROGRESS NOTES
Chief Complaint   Patient presents with   • Annual Wellness Visit       HPI:  Jacinda is a 70 y.o. here for Medicare Annual Wellness Visit      Patient Active Problem List    Diagnosis Date Noted   • BMI 29.0-29.9,adult 01/18/2019   • Pulmonary hypertension (HCC) 05/25/2018   • Hiatal hernia 05/16/2018   • Breast ductal hyperplasia, atypical 05/16/2018   • Dyspnea on exertion 01/04/2018   • Mild intermittent asthma without complication 07/19/2016   • GERD (gastroesophageal reflux disease) 05/05/2016   • Post-nasal drip 05/05/2016   • HLD (hyperlipidemia) 07/07/2015   • Hormone replacement therapy (postmenopausal) 07/07/2015   • Thalassemia minor 07/07/2015   • H/O breast surgery 03/12/2015   • Personal history of other malignant neoplasm of skin 08/01/2011       Current Outpatient Medications   Medication Sig Dispense Refill   • ipratropium-albuterol (DUONEB) 0.5-2.5 (3) MG/3ML nebulizer solution 3 mL by Nebulization route every 6 hours as needed for Shortness of Breath. 30 Bullet 4   • metronidazole (METROCREAM) 0.75 % cream      • predniSONE (DELTASONE) 10 MG Tab 4 po every day x 3 d 3 po every day x 3d  2po every day X 3 d 1 po every day X 3 d     • GuaiFENesin (MUCINEX PO) Take  by mouth.     • ibuprofen (MOTRIN) 600 MG TABS Take 600 mg by mouth every 6 hours as needed.     • naproxen (NAPROSYN) 250 MG Tab Take 250 mg by mouth as needed.     • albuterol (PROAIR HFA) 108 (90 Base) MCG/ACT Aero Soln inhalation aerosol USE 2 INHALATIONS EVERY 6 HOURS AS NEEDED FOR SHORTNESS OF BREATH 25.5 g 3   • Estradiol-Norethindrone Acet 0.5-0.1 MG Tab TAKE 1 TABLET DAILY (MAKE APPOINTMENT PRIOR TO MORE REFILLS) 84 Tab 0   • cyanocobalamin (VITAMIN B-12) 1000 MCG/ML Solution 1 mL by Intramuscular route every 14 days for 90 days. 6 mL 0   • Ferrous Sulfate 5 MG/20ML Liquid Take 5 mg by mouth every day.     • Epinastine HCl 0.05 % Solution      • Fluticasone Furoate-Vilanterol (BREO) 100-25 MCG/INH AEROSOL POWDER, BREATH  ACTIVATED Inhale  by mouth.     • Fluticasone Furoate-Vilanterol (BREO ELLIPTA) 200-25 MCG/INH AEROSOL POWDER, BREATH ACTIVATED Inhale 1 Puff by mouth every day. Rinse mouth after use. 3 Each 3   • celecoxib (CELEBREX) 200 MG Cap      • pramipexole (MIRAPEX) 0.125 MG Tab      • montelukast (SINGULAIR) 10 MG Tab TAKE 1 TABLET DAILY 90 Tab 3   • simvastatin (ZOCOR) 20 MG Tab TAKE 1 TABLET EVERY EVENING 90 Tab 3   • omeprazole (PRILOSEC) 40 MG delayed-release capsule Take 1 Cap by mouth every day. 90 Cap 3   • SHINGRIX 50 MCG/0.5ML Recon Susp      • Lifitegrast (XIIDRA) 5 % Solution by Ophthalmic route.     • aspirin 81 MG tablet Take 81 mg by mouth.     • acetaminophen (TYLENOL) 325 MG Tab Take 650 mg by mouth.     • Cholecalciferol 1000 units Chew Tab Take 5,000 Units by mouth.     • Flaxseed Oil (LINSEED OIL) Oil 1400mg daily     • Probiotic Product (ALIGN PO) Take  by mouth.     • Nasal Wash (ALKALOL) Solution Spray  in nose.       No current facility-administered medications for this visit.         Patient is taking medications as noted in medication list.  Current supplements as per medication list.     Allergies: Tape    Current social contact/activities: Visiting with friends and family.    Is patient current with immunizations? Yes.    She  reports that she has never smoked. She has never used smokeless tobacco. She reports that she does not drink alcohol or use drugs.  Counseling given: Not Answered  Comment: 2nd hand exposure        DPA/Advanced directive: Patient does not have an Advanced Directive.  A packet and workshop information was given on Advanced Directives.    ROS:    Gait: Uses no assistive device   Ostomy: No   Other tubes: No   Amputations: No   Chronic oxygen use No   Last eye exam 2020  Wears hearing aids: No   : Reports urinary leakage during the last 6 months that has not interfered at all with their daily activities or sleep.  Annual Health Assessment Questions:    1.  Are you currently  engaging in any exercise or physical activity? Yes    2.  How would you describe your mood or emotional well-being today? good    3.  Have you had any falls in the last year? No    4.  Have you noticed any problems with your balance or had difficulty walking? No    5.  In the last six months have you experienced any leakage of urine? Yes    6. DPA/Advanced Directive: Patient does not have an Advanced Directive.  A packet and workshop information was given on Advanced Directives.    Screening:        Depression Screening    Little interest or pleasure in doing things?  0 - not at all  Feeling down, depressed, or hopeless? 0 - not at all  Patient Health Questionnaire Score: 0    If depressive symptoms identified deferred to follow up visit unless specifically addressed in assessment and plan.    Interpretation of PHQ-9 Total Score   Score Severity   1-4 No Depression   5-9 Mild Depression   10-14 Moderate Depression   15-19 Moderately Severe Depression   20-27 Severe Depression    Screening for Cognitive Impairment    Three Minute Recall (river, nation, finger)  2/3 River, nation, finger  Dennis clock face with all 12 numbers and set the hands to show 10 past 11.  Yes 11:10 5/5  If cognitive concerns identified, deferred for follow up unless specifically addressed in assessment and plan.    Fall Risk Assessment    Has the patient had two or more falls in the last year or any fall with injury in the last year?  No  If fall risk identified, deferred for follow up unless specifically addressed in assessment and plan.    Safety Assessment    Throw rugs on floor.  Yes  Handrails on all stairs.  Yes  Good lighting in all hallways.  Yes  Difficulty hearing.  No  Patient counseled about all safety risks that were identified.    Functional Assessment ADLs    Are there any barriers preventing you from cooking for yourself or meeting nutritional needs?  No.    Are there any barriers preventing you from driving safely or obtaining  transportation?  No.    Are there any barriers preventing you from using a telephone or calling for help?  No.    Are there any barriers preventing you from shopping?  No.    Are there any barriers preventing you from taking care of your own finances?  No.    Are there any barriers preventing you from managing your medications?  No.    Are there any barriers preventing you from showering, bathing or dressing yourself?  No.    Are you currently engaging in any exercise or physical activity?  Yes.  Hiking, walking 5xq week  What is your perception of your health?  Good.    Health Maintenance Summary                Annual Wellness Visit Overdue 6/8/2017      Done 6/7/2016      Patient has more history with this topic...    COLONOSCOPY Overdue 7/20/2020      Done 7/20/2010 est date     Patient has more history with this topic...    BONE DENSITY Overdue 8/3/2020      Done 6/15/2009 Ext Proc: DS-BONE DENSITY STUDY (DEXA)     Patient has more history with this topic...    MAMMOGRAM Next Due 12/17/2020      Done 10/3/2011 Ext Proc: NM DIAGNOSTICMAMMOGRAPHYDIGITAL     Patient has more history with this topic...    IMM DTaP/Tdap/Td Vaccine Next Due 4/11/2029      Done 4/11/2019 Imm Admin: Tdap Vaccine     Patient has more history with this topic...          Patient Care Team:  Arcelia Walton M.D. as PCP - General (Family Medicine)  Kallie Rodriguez M.D. (Cardiology)  [facility] (Inactive)  Elizabeth Abdullahi M.D. as Consulting Physician (Pulmonary Medicine)  DAGOBERTO as Consulting Physician (Orthopaedics)  Mark Mao M.D. (Otolaryngology)  Awais Richmond M.D. (Pediatric Pulmonology)  Zhao Ibanez M.D. as Consulting Physician (Ophthalmology)    Social History     Tobacco Use   • Smoking status: Never Smoker   • Smokeless tobacco: Never Used   • Tobacco comment: 2nd hand exposure   Substance Use Topics   • Alcohol use: No     Alcohol/week: 0.0 oz     Frequency: Never     Binge frequency: Never   • Drug  "use: No     Family History   Problem Relation Age of Onset   • Heart Disease Mother    • Hyperlipidemia Mother    • Hypertension Mother    • Stroke Mother    • Other Father         Plane crash   • No Known Problems Sister    • Heart Attack Maternal Aunt    • Heart Attack Maternal Grandmother      She  has a past medical history of HLD (hyperlipidemia) (7/7/2015) and Mild intermittent asthma without complication (7/19/2016).   Past Surgical History:   Procedure Laterality Date   • KNEE ARTHROSCOPY Right 2015   • LUMPECTOMY Left 4/2012   • BASAL CELL EXCISION  2010    Forehead    • BLEPHAROPLASTY  2010   • SHOULDER ARTHROSCOPY  1992   • HERNIA REPAIR  1990    inguinal    • LAPAROSCOPY  1984    AGA   • PB REMV 2ND CATARACT,CORN-SCLER SECTN         Exam:     /68 (BP Location: Right arm, Patient Position: Sitting, BP Cuff Size: Adult)   Pulse 69   Temp 36.9 °C (98.4 °F) (Temporal)   Resp 18   Ht 1.651 m (5' 5\")   Wt 71 kg (156 lb 9.6 oz)   SpO2 96%  Body mass index is 26.06 kg/m².    Hearing excellent.    Dentition   Alert, oriented in no acute distress.  Eye contact is good, speech goal directed, affect calm      Assessment and Plan. The following treatment and monitoring plan is recommended:    Encounter Diagnoses   Name Primary?   • Gastroesophageal reflux disease, unspecified whether esophagitis present    • Hyperlipidemia, unspecified hyperlipidemia type    • Hormone replacement therapy (postmenopausal)    • Other insomnia    • B12 deficiency    • Iron deficiency anemia, unspecified iron deficiency anemia type    • Postmenopausal bone loss    • Breast ductal hyperplasia, atypical    • Encounter for screening mammogram for malignant neoplasm of breast      Continue current meds.  Continue follow up on mammogram/US as needed.   Referral to hematology per patient request.  Repeat colonoscopy in 2021.        Services suggested: No services needed at this time  Health Care Screening recommendations as per " orders if indicated.  Referrals offered: PT/OT/Nutrition counseling/Behavioral Health/Smoking cessation as per orders if indicated.    Discussion today about general wellness and lifestyle habits:    · Prevent falls and reduce trip hazards; Cautioned about securing or removing rugs.  · Have a working fire alarm and carbon monoxide detector;   · Engage in regular physical activity and social activities       Follow-up: follow up in 6-12 months sooner prn.

## 2021-01-11 ENCOUNTER — HOSPITAL ENCOUNTER (OUTPATIENT)
Dept: RADIOLOGY | Facility: MEDICAL CENTER | Age: 71
End: 2021-01-11
Attending: FAMILY MEDICINE
Payer: MEDICARE

## 2021-01-11 DIAGNOSIS — M81.0 POSTMENOPAUSAL BONE LOSS: ICD-10-CM

## 2021-01-11 DIAGNOSIS — R92.8 ABNORMAL FINDING ON BREAST IMAGING: ICD-10-CM

## 2021-01-11 PROCEDURE — G0279 TOMOSYNTHESIS, MAMMO: HCPCS

## 2021-01-11 PROCEDURE — 77080 DXA BONE DENSITY AXIAL: CPT

## 2021-01-13 DIAGNOSIS — J45.20 MILD INTERMITTENT ASTHMA WITHOUT COMPLICATION: ICD-10-CM

## 2021-01-14 RX ORDER — MONTELUKAST SODIUM 10 MG/1
TABLET ORAL
Qty: 90 TAB | Refills: 3 | Status: SHIPPED | OUTPATIENT
Start: 2021-01-14 | End: 2022-05-12 | Stop reason: SDUPTHER

## 2021-01-14 NOTE — TELEPHONE ENCOUNTER
Have we ever prescribed this med? Yes.  If yes, what date? 01/23/20    Last OV: 08/17/20 with Dr Abdullahi    Next OV: 02/17/2021 with Dr Abdullahi    DX: Mild intermittent asthma without complication (J45.20)    Medications:   Requested Prescriptions     Pending Prescriptions Disp Refills   • montelukast (SINGULAIR) 10 MG Tab [Pharmacy Med Name: MONTELUKAST SODIUM TABS 10MG] 90 Tab 3     Sig: TAKE 1 TABLET DAILY

## 2021-01-16 DIAGNOSIS — Z23 NEED FOR VACCINATION: ICD-10-CM

## 2021-01-23 ENCOUNTER — IMMUNIZATION (OUTPATIENT)
Dept: FAMILY PLANNING/WOMEN'S HEALTH CLINIC | Facility: IMMUNIZATION CENTER | Age: 71
End: 2021-01-23
Attending: INTERNAL MEDICINE
Payer: MEDICARE

## 2021-01-23 DIAGNOSIS — Z23 NEED FOR VACCINATION: ICD-10-CM

## 2021-01-23 DIAGNOSIS — Z23 ENCOUNTER FOR VACCINATION: Primary | ICD-10-CM

## 2021-01-23 PROCEDURE — 91300 PFIZER SARS-COV-2 VACCINE: CPT

## 2021-01-23 PROCEDURE — 0001A PFIZER SARS-COV-2 VACCINE: CPT

## 2021-02-13 ENCOUNTER — IMMUNIZATION (OUTPATIENT)
Dept: FAMILY PLANNING/WOMEN'S HEALTH CLINIC | Facility: IMMUNIZATION CENTER | Age: 71
End: 2021-02-13
Attending: INTERNAL MEDICINE
Payer: MEDICARE

## 2021-02-13 DIAGNOSIS — Z23 ENCOUNTER FOR VACCINATION: Primary | ICD-10-CM

## 2021-02-13 PROCEDURE — 0002A PFIZER SARS-COV-2 VACCINE: CPT

## 2021-02-13 PROCEDURE — 91300 PFIZER SARS-COV-2 VACCINE: CPT

## 2021-02-17 ENCOUNTER — OFFICE VISIT (OUTPATIENT)
Dept: SLEEP MEDICINE | Facility: MEDICAL CENTER | Age: 71
End: 2021-02-17
Payer: MEDICARE

## 2021-02-17 VITALS
BODY MASS INDEX: 24.99 KG/M2 | RESPIRATION RATE: 14 BRPM | TEMPERATURE: 97.9 F | WEIGHT: 150 LBS | SYSTOLIC BLOOD PRESSURE: 122 MMHG | HEART RATE: 64 BPM | DIASTOLIC BLOOD PRESSURE: 72 MMHG | OXYGEN SATURATION: 98 % | HEIGHT: 65 IN

## 2021-02-17 DIAGNOSIS — K21.9 GASTROESOPHAGEAL REFLUX DISEASE, UNSPECIFIED WHETHER ESOPHAGITIS PRESENT: ICD-10-CM

## 2021-02-17 DIAGNOSIS — J45.20 MILD INTERMITTENT ASTHMA WITHOUT COMPLICATION: ICD-10-CM

## 2021-02-17 DIAGNOSIS — R06.09 DYSPNEA ON EXERTION: ICD-10-CM

## 2021-02-17 PROCEDURE — 99214 OFFICE O/P EST MOD 30 MIN: CPT | Performed by: INTERNAL MEDICINE

## 2021-02-17 RX ORDER — FLUTICASONE PROPIONATE 50 MCG
1 SPRAY, SUSPENSION (ML) NASAL DAILY
COMMUNITY
End: 2021-04-04

## 2021-02-17 RX ORDER — ALBUTEROL SULFATE 2.5 MG/3ML
SOLUTION RESPIRATORY (INHALATION)
COMMUNITY
Start: 2021-02-11 | End: 2021-04-04

## 2021-02-17 ASSESSMENT — FIBROSIS 4 INDEX: FIB4 SCORE: 1.31

## 2021-02-17 NOTE — PROGRESS NOTES
Jacinda Patiño is a 70 y.o. female here for reactive airways on maintenance and rescue inhaler. Patient was referred by primary.    History of Present Illness: As follows  Carolina comes in today with her notes very carefully detailing a very good response to initiated treatment from Dr. Pelletier in November or December.  When I saw her in mid summer, the smoke and congestion were evident, she was on Breo but was producing some chunky yellow mucus.  She saw Dr. Melissa Barlow in November, he gave her a 5-day course of prednisone 40, placed her on DuoNeb twice daily and she had a remarkably good response.    In addition she has lost 30 pounds, body mass index is down to 25 and this should improve her exercise tolerance.  She still finds she is lasting line going uphill with her hiking friends.    She does use ProAir pretty walking and this is encouraged.  At present her oropharynx looks clean, she does have some minor pale mucus in the back and postnasal drip is evident.  Does not bother her substantially.    I updated her pharmacy log sheet, she is now on Breo 200, not 100 and rinse and gargle after, no signs of oral thrush.  Assuming the best we can check her in 6 months, sooner if new problems occur    Of note she had her second Covid vaccine recently, Pfizer, doing well  Constitutional ROS: No unexpected change in weight, No unexplained fevers  Eyes: No change in vision or blurring or double vision  Mouth/Throat ROS: No sore throat, No recent change in voice or hoarseness  Pulmonary ROS: See present history for pertinent positives  Cardiovascular ROS: No chest pain to suggest acute coronary syndrome  Gastrointestinal ROS: No abdominal pain to suggest peptic disease  Musculoskeletal/Extremities ROS: no acute artritis or unusual swelling  Hematologic/Lymphatic ROS: No easy bleeding or unusual lymph node swelling  Neurologic ROS: No new or unusual weakness  Psychiatric ROS: No hallucinations  Allergic/Immunologic: No   urticaria or allergic rash      Current Outpatient Medications   Medication Sig Dispense Refill   • albuterol (PROVENTIL) 2.5mg/3ml Nebu Soln solution for nebulization      • fluticasone (FLONASE) 50 MCG/ACT nasal spray Administer 1 Spray into affected nostril(S) every day.     • montelukast (SINGULAIR) 10 MG Tab TAKE 1 TABLET DAILY 90 Tab 3   • omeprazole (PRILOSEC) 40 MG delayed-release capsule Take 1 Cap by mouth every day. 90 Cap 3   • simvastatin (ZOCOR) 20 MG Tab TAKE 1 TABLET EVERY EVENING 90 Tab 3   • Estradiol-Norethindrone Acet 0.5-0.1 MG Tab TAKE 1 TABLET DAILY (Saira mfr only please) 90 Tab 3   • triazolam (HALCION) 0.125 MG tablet Take 1 Tab by mouth at bedtime as needed for up to 90 days. 90 Tab 1   • cyanocobalamin (VITAMIN B-12) 1000 MCG/ML Solution Inject 1 mL into the shoulder, thigh, or buttocks every 14 days for 90 days. 6 mL 3   • naproxen (NAPROSYN) 250 MG Tab Take 250 mg by mouth as needed.     • albuterol (PROAIR HFA) 108 (90 Base) MCG/ACT Aero Soln inhalation aerosol USE 2 INHALATIONS EVERY 6 HOURS AS NEEDED FOR SHORTNESS OF BREATH 25.5 g 3   • Ferrous Sulfate 5 MG/20ML Liquid Take 5 mg by mouth every day.     • Fluticasone Furoate-Vilanterol (BREO ELLIPTA) 200-25 MCG/INH AEROSOL POWDER, BREATH ACTIVATED Inhale 1 Puff by mouth every day. Rinse mouth after use. 3 Each 3   • celecoxib (CELEBREX) 200 MG Cap      • ipratropium-albuterol (DUONEB) 0.5-2.5 (3) MG/3ML nebulizer solution 3 mL by Nebulization route every 6 hours as needed for Shortness of Breath. 30 Bullet 4   • predniSONE (DELTASONE) 10 MG Tab 4 po every day x 3 d 3 po every day x 3d  2po every day X 3 d 1 po every day X 3 d     • aspirin 81 MG tablet Take 81 mg by mouth.     • acetaminophen (TYLENOL) 325 MG Tab Take 650 mg by mouth.     • Probiotic Product (ALIGN PO) Take  by mouth.     • Nasal Wash (ALKALOL) Solution Spray  in nose.     • Epinastine HCl 0.05 % Solution      • pramipexole (MIRAPEX) 0.125 MG Tab      •  "metronidazole (METROCREAM) 0.75 % cream      • SHINGRIX 50 MCG/0.5ML Recon Susp      • Lifitegrast (XIIDRA) 5 % Solution by Ophthalmic route.     • Cholecalciferol 1000 units Chew Tab Take 5,000 Units by mouth.     • Flaxseed Oil (LINSEED OIL) Oil 1400mg daily     • GuaiFENesin (MUCINEX PO) Take  by mouth.     • ibuprofen (MOTRIN) 600 MG TABS Take 600 mg by mouth every 6 hours as needed.       No current facility-administered medications for this visit.       Social History     Tobacco Use   • Smoking status: Never Smoker   • Smokeless tobacco: Never Used   • Tobacco comment: 2nd hand exposure   Substance Use Topics   • Alcohol use: No     Alcohol/week: 0.0 oz   • Drug use: No        Past Medical History:   Diagnosis Date   • HLD (hyperlipidemia) 7/7/2015   • Mild intermittent asthma without complication 7/19/2016       Past Surgical History:   Procedure Laterality Date   • KNEE ARTHROSCOPY Right 2015   • LUMPECTOMY Left 4/2012   • BASAL CELL EXCISION  2010    Forehead    • BLEPHAROPLASTY  2010   • SHOULDER ARTHROSCOPY  1992   • HERNIA REPAIR  1990    inguinal    • LAPAROSCOPY  1984    AGA   • PB REMV 2ND CATARACT,CORN-SCLER SECTN         Allergies: Tape    Family History   Problem Relation Age of Onset   • Heart Disease Mother    • Hyperlipidemia Mother    • Hypertension Mother    • Stroke Mother    • Other Father         Plane crash   • No Known Problems Sister    • Heart Attack Maternal Aunt    • Heart Attack Maternal Grandmother        Physical Examination    Vitals:    02/17/21 1009   Height: 1.638 m (5' 4.5\")   Weight: 68 kg (150 lb)   Weight % change since last entry.: 0 %   BP: 122/72   Pulse: 64   BMI (Calculated): 25.35   Resp: 14   Temp: 36.6 °C (97.9 °F)   TempSrc: Temporal       General Appearance: alert, no distress  Skin: Skin color, texture, turgor normal. No rashes or lesions.  Eyes: negative  Oropharynx: Lips, mucosa, and tongue normal. Teeth and gums normal. Oropharynx moist and without " lesion  Lungs: positive findings: Quiet and clear  Heart: negative. RRR without murmur, gallop, or rubs.  No ectopy.  Abdomen: Abdomen soft, non-tender. . No masses,  No organomegaly  Extremities:  No deformities, edema, or skin discoloration  Joints: No acute arthritis  Peripheral Pulses:perfused  Neurologic: intact grossly  No clubbing    I (soft palate, uvula, fauces, tonsillar pillars visible)    Imaging: None today    PFTS: Noted previously      Assessment and Plan  1. Mild intermittent asthma without complication  Breo and albuterol    2. Dyspnea on exertion  Improved with weight loss and maintenance inhalers    3. Gastroesophageal reflux disease, unspecified whether esophagitis present        Followup 6 mos

## 2021-02-17 NOTE — PATIENT INSTRUCTIONS
Carolina comes in today with her notes very carefully detailing a very good response to initiated treatment from Dr. Law eubanks in November or December.  When I saw her in mid summer, the smoke and congestion were evident, she was on Breo but was producing some chunky yellow mucus.  She saw Dr. Melissa Barlow in November, he gave her a 5-day course of prednisone 40, placed her on DuoNeb twice daily and she had a remarkably good response.    In addition she has lost 30 pounds, body mass index is down to 25 and this should improve her exercise tolerance.  She still finds she is lasting line going uphill with her hiking friends.    She does use ProAir pretty walking and this is encouraged.  At present her oropharynx looks clean, she does have some minor pale mucus in the back and postnasal drip is evident.  Does not bother her substantially.    I updated her pharmacy log sheet, she is now on Breo 200, not 100 and rinse and gargle after, no signs of oral thrush.  Assuming the best we can check her in 6 months, sooner if new problems occur    Of note she had her second Covid vaccine recently, Pfizer, doing well

## 2021-03-07 DIAGNOSIS — J45.20 MILD INTERMITTENT ASTHMA WITHOUT COMPLICATION: ICD-10-CM

## 2021-03-07 DIAGNOSIS — R09.82 POSTNASAL DRIP: ICD-10-CM

## 2021-03-08 NOTE — TELEPHONE ENCOUNTER
Have we ever prescribed this med? Yes.  If yes, what date? 03/13/2020    Last OV: 02/17/2021    Next OV: 09/07/2021 - Dr. Abdullahi    DX: Reactive airway    Medications: Breo

## 2021-04-04 ENCOUNTER — APPOINTMENT (OUTPATIENT)
Dept: RADIOLOGY | Facility: MEDICAL CENTER | Age: 71
End: 2021-04-04
Attending: EMERGENCY MEDICINE
Payer: MEDICARE

## 2021-04-04 ENCOUNTER — HOSPITAL ENCOUNTER (EMERGENCY)
Facility: MEDICAL CENTER | Age: 71
End: 2021-04-04
Attending: EMERGENCY MEDICINE
Payer: MEDICARE

## 2021-04-04 VITALS
HEIGHT: 64 IN | TEMPERATURE: 98 F | OXYGEN SATURATION: 95 % | RESPIRATION RATE: 16 BRPM | HEART RATE: 72 BPM | WEIGHT: 154.32 LBS | SYSTOLIC BLOOD PRESSURE: 127 MMHG | DIASTOLIC BLOOD PRESSURE: 76 MMHG | BODY MASS INDEX: 26.35 KG/M2

## 2021-04-04 DIAGNOSIS — S42.292A OTHER CLOSED DISPLACED FRACTURE OF PROXIMAL END OF LEFT HUMERUS, INITIAL ENCOUNTER: ICD-10-CM

## 2021-04-04 PROCEDURE — 73060 X-RAY EXAM OF HUMERUS: CPT | Mod: LT

## 2021-04-04 PROCEDURE — 700111 HCHG RX REV CODE 636 W/ 250 OVERRIDE (IP): Performed by: EMERGENCY MEDICINE

## 2021-04-04 PROCEDURE — 96372 THER/PROPH/DIAG INJ SC/IM: CPT

## 2021-04-04 PROCEDURE — 99284 EMERGENCY DEPT VISIT MOD MDM: CPT

## 2021-04-04 RX ORDER — FLUTICASONE FUROATE 27.5 UG/1
2 SPRAY, METERED NASAL DAILY
COMMUNITY

## 2021-04-04 RX ORDER — CYANOCOBALAMIN 1000 UG/ML
1000 INJECTION, SOLUTION INTRAMUSCULAR; SUBCUTANEOUS
COMMUNITY

## 2021-04-04 RX ORDER — HYDROCODONE BITARTRATE AND ACETAMINOPHEN 5; 325 MG/1; MG/1
1-2 TABLET ORAL EVERY 6 HOURS PRN
Qty: 24 TABLET | Refills: 0 | Status: SHIPPED | OUTPATIENT
Start: 2021-04-04 | End: 2021-04-09

## 2021-04-04 RX ORDER — ONDANSETRON 2 MG/ML
4 INJECTION INTRAMUSCULAR; INTRAVENOUS ONCE
Status: COMPLETED | OUTPATIENT
Start: 2021-04-04 | End: 2021-04-04

## 2021-04-04 RX ORDER — MORPHINE SULFATE 4 MG/ML
4 INJECTION, SOLUTION INTRAMUSCULAR; INTRAVENOUS ONCE
Status: COMPLETED | OUTPATIENT
Start: 2021-04-04 | End: 2021-04-04

## 2021-04-04 RX ADMIN — MORPHINE SULFATE 4 MG: 4 INJECTION INTRAVENOUS at 11:44

## 2021-04-04 RX ADMIN — ONDANSETRON 4 MG: 2 INJECTION INTRAMUSCULAR; INTRAVENOUS at 11:44

## 2021-04-04 ASSESSMENT — FIBROSIS 4 INDEX: FIB4 SCORE: 1.33

## 2021-04-04 NOTE — ED NOTES
Med rec updated and complete  Allergies reviewed  Interviewed pt with  at bedside with permission from pt.  Pt had a list of medications at bedside, went over list of medications and returned list of medications back to pts   Pt reports no antibiotics in the last 2 weeks

## 2021-04-04 NOTE — ED NOTES
1224 VS rechecked Stable Mild improvement og pain Sling and ice applied D/C instn reviewed To rest/ice/norco prn Advised not to drive post meds rec'd in ER D/C ambul with spouse Stable improved condn

## 2021-04-04 NOTE — ED PROVIDER NOTES
ED Provider Note    CHIEF COMPLAINT   Chief Complaint   Patient presents with   • T-5000 FALL   • Shoulder Injury       HPI   Jacinda Patiño is a 71 y.o. female who presents with left proximal arm pain after falling off a curb striking the ground with her left arm.  She denies head or neck injury.  Pain is greatest just distal to the shoulder joint at the proximal humerus.  She denies clavicle pain.  No rib pain.  No difficulty breathing.    REVIEW OF SYSTEMS   Musculoskeletal: Left arm pain  Neurologic: No head injury  Skin: No laceration      PAST MEDICAL HISTORY   Past Medical History:   Diagnosis Date   • HLD (hyperlipidemia) 7/7/2015   • Mild intermittent asthma without complication 7/19/2016       FAMILY HISTORY  Family History   Problem Relation Age of Onset   • Heart Disease Mother    • Hyperlipidemia Mother    • Hypertension Mother    • Stroke Mother    • Other Father         Plane crash   • No Known Problems Sister    • Heart Attack Maternal Aunt    • Heart Attack Maternal Grandmother        SOCIAL HISTORY  Social History     Socioeconomic History   • Marital status:      Spouse name: Not on file   • Number of children: Not on file   • Years of education: Not on file   • Highest education level: Not on file   Occupational History   • Occupation: Education      Comment: Retired   Tobacco Use   • Smoking status: Never Smoker   • Smokeless tobacco: Never Used   • Tobacco comment: 2nd hand exposure   Substance and Sexual Activity   • Alcohol use: No     Alcohol/week: 0.0 oz   • Drug use: No   • Sexual activity: Not on file   Other Topics Concern   • Not on file   Social History Narrative   • Not on file     Social Determinants of Health     Financial Resource Strain:    • Difficulty of Paying Living Expenses:    Food Insecurity:    • Worried About Running Out of Food in the Last Year:    • Ran Out of Food in the Last Year:    Transportation Needs:    • Lack of Transportation (Medical):    • Lack of  "Transportation (Non-Medical):    Physical Activity:    • Days of Exercise per Week:    • Minutes of Exercise per Session:    Stress:    • Feeling of Stress :    Social Connections:    • Frequency of Communication with Friends and Family:    • Frequency of Social Gatherings with Friends and Family:    • Attends Spiritism Services:    • Active Member of Clubs or Organizations:    • Attends Club or Organization Meetings:    • Marital Status:    Intimate Partner Violence:    • Fear of Current or Ex-Partner:    • Emotionally Abused:    • Physically Abused:    • Sexually Abused:        SURGICAL HISTORY  Past Surgical History:   Procedure Laterality Date   • KNEE ARTHROSCOPY Right 2015   • LUMPECTOMY Left 4/2012   • BASAL CELL EXCISION  2010    Forehead    • BLEPHAROPLASTY  2010   • SHOULDER ARTHROSCOPY  1992   • HERNIA REPAIR  1990    inguinal    • LAPAROSCOPY  1984    AGA   • PB REMV 2ND CATARACT,CORN-SCLER SECTN         CURRENT MEDICATIONS   Home Medications    **Home medications have not yet been reviewed for this encounter**         ALLERGIES   Allergies   Allergen Reactions   • Tape Rash     RASH       PHYSICAL EXAM  VITAL SIGNS: /85   Pulse 66   Temp 36.1 °C (97 °F) (Temporal)   Resp 20   Ht 1.626 m (5' 4\")   Wt 70 kg (154 lb 5.2 oz)   SpO2 99%   BMI 26.49 kg/m²   Skin: No bruising.  Mild swelling left proximal humerus region.  No laceration or abrasion to the area of injury.   Vascular: Intact distal capillary refill.   Musculoskeletal: Left clavicle nontender.  Marked tenderness of the left proximal humerus.  Left elbow nontender.  Cervical thoracic and lumbar spine are nontender  Neurologic: Sensation intact left hand    RADIOLOGY/PROCEDURES  DX-HUMERUS 2+ LEFT   Final Result      Acute, comminuted fractures of the left humeral neck.            COURSE & MEDICAL DECISION MAKING  Pertinent Labs & Imaging studies reviewed. (See chart for details)  Patient with a fall with left proximal arm/shoulder " injury.  X-ray reveals a humerus fracture, no evidence of shoulder dislocation or clavicle fracture  Patient with a humeral neck fracture, she is placed in a sling.  She is referred to orthopedics.  Hydrocodone as prescribed for pain control at home to be used as needed.  Patient received an intramuscular dose of morphine in the emergency department for pain control.    FINAL IMPRESSION     1. Other closed displaced fracture of proximal end of left humerus, initial encounter  HYDROcodone-acetaminophen (NORCO) 5-325 MG Tab per tablet             Electronically signed by: Mark Sheikh M.D., 4/4/2021 11:40 AM

## 2021-04-04 NOTE — ED TRIAGE NOTES
"Pt present complaining of left shoulder pain after incurring a GLF, earlier today.  While stepping down a curb, she lost her footing striking the ground with her shoulder.   Chief Complaint   Patient presents with   • T-5000 FALL   • Shoulder Injury     /85   Pulse 66   Temp 36.1 °C (97 °F) (Temporal)   Resp 20   Ht 1.626 m (5' 4\")   Wt 70 kg (154 lb 5.2 oz)   SpO2 99%   BMI 26.49 kg/m²      "

## 2021-04-04 NOTE — ED NOTES
1148 Medicated for pain and nausea Assessment done  1156 P.X-ray completed POC reviewed Awaiting results

## 2021-04-26 ENCOUNTER — PHYSICAL THERAPY (OUTPATIENT)
Dept: PHYSICAL THERAPY | Facility: REHABILITATION | Age: 71
End: 2021-04-26
Attending: ORTHOPAEDIC SURGERY
Payer: MEDICARE

## 2021-04-26 DIAGNOSIS — S42.202A TRAUMATIC CLOSED DISPLACED FRACTURE OF PROXIMAL END OF LEFT HUMERUS, INITIAL ENCOUNTER: ICD-10-CM

## 2021-04-26 PROCEDURE — 97162 PT EVAL MOD COMPLEX 30 MIN: CPT

## 2021-04-26 PROCEDURE — 97110 THERAPEUTIC EXERCISES: CPT

## 2021-04-26 ASSESSMENT — ENCOUNTER SYMPTOMS
PAIN SCALE AT LOWEST: 5
ALLEVIATING FACTORS: PAIN MEDICATION
EXACERBATED BY: SLEEPING
QUALITY: SHARP
EXACERBATED BY: CARRYING
PAIN TIMING: OCCASIONAL
PAIN TIMING: INTERMITTENT
EXACERBATED BY: BENDING
ALLEVIATING FACTORS: COLD/ICE
EXACERBATED BY: LIFTING
ALLEVIATING FACTORS: POSITION CHANGE
AWAKENINGS PER NIGHT: 2
PAIN SCALE AT HIGHEST: 9
QUALITY: DISCOMFORT
QUALITY: ACHING
PAIN SCALE: 6

## 2021-04-26 NOTE — OP THERAPY EVALUATION
Outpatient Physical Therapy  INITIAL EVALUATION    [facility] Physical Therapy [place] [address], [address]  [facility] [address]  Phone:  [phone]    Date of Evaluation: 2021    Patient: Jacinda Patiño  YOB: 1950  MRN: [id number]     Referring Provider: Valdez Pete M.D.  [address] [place] [address]   Referring Diagnosis Other nondisplaced fracture of upper end of left humerus, initial encounter for closed fracture [S42.295A]     Time Calculation    Start time: 930  Stop time:  Time Calculation (min): 60 minutes         Chief Complaint: Shoulder Problem    Visit Diagnoses     ICD-10-CM   1. Traumatic closed displaced fracture of proximal end of left humerus, initial encounter  S42.A       No data found  Subjective:   History of Present Illness:     Mechanism of injury:  The patient is a 71 year old female who reports having a proximal humeral fracture to the (L) humerus. She fell on  and fractured her (L) shoulder and (L) hip. She sleeps on her (R side and low back area. She has discomfort to the upper back and (L) side of the posterior shoulder. She is taking pain medication for her continued pain symptoms at night prn. She has hx of (L) clavicle broken when she was 11 years old. She has hx of (R) shoulder injury ~30 years ago. She plays the harp on occasion practicing. She has not played in 3 weeks. Her harp weighs about 85 lbs. She has difficulty with turning a jar cap off. She cannot cut food in the kitchen. She has pain sometimes moving out of a chair.  She can lean on the bathroom sink to put deodorant on. She has difficulty with washing her back. She has tightness to her (L) side of her neck and shoulder. The patient cannot carry a plate in the (L) hand  Headaches:  no headaches  Sleep disturbance:  Interrupted sleep  # Times/Night awakened:  2  Pain:     Current pain ratin    At best pain ratin    At worst pain ratin     Quality:  Aching, discomfort and sharp    Pain timing:  Intermittent and occasional    Relieving factors:  Cold/ice, pain medication and position change    Aggravating factors:  Bending, carrying, lifting and sleeping  Social Support:     Lives in:  Multiple-level home    Lives with:  Spouse  Hand dominance:  Right  Patient Goals:     Patient goals for therapy:  Decreased pain, increased motion and increased strength    Other patient goals:  To get 100% mobility back ans strength to the (L) UE; to hike with less      Past Medical History:   Diagnosis Date   • HLD (hyperlipidemia) 7/7/2015   • Mild intermittent asthma without complication 7/19/2016     Past Surgical History:   Procedure Laterality Date   • KNEE ARTHROSCOPY Right 2015   • LUMPECTOMY Left 4/2012   • BASAL CELL EXCISION  2010    Forehead    • BLEPHAROPLASTY  2010   • SHOULDER ARTHROSCOPY  1992   • HERNIA REPAIR  1990    inguinal    • LAPAROSCOPY  1984    AGA   • PB REMV 2ND CATARACT,CORN-SCLER SECTN       Social History     Tobacco Use   • Smoking status: Never Smoker   • Smokeless tobacco: Never Used   • Tobacco comment: 2nd hand exposure   Substance Use Topics   • Alcohol use: No     Alcohol/week: 0.0 oz     Family and Occupational History     Socioeconomic History   • Marital status:      Spouse name: Not on file   • Number of children: Not on file   • Years of education: Not on file   • Highest education level: Not on file   Occupational History   • Occupation: Education      Comment: Retired       Objective     Postural Observations  Seated posture: fair  Standing posture: fair  Correction of posture: has no consistent effect    Additional Postural Observation Details  Patient has (L) shoulder sling for support of the UE.     Tenderness     Left Shoulder   Tenderness in the lateral scapula.     Additional Tenderness Details  Patient has increased tenderness and tightness to the (L) upper traps and into the posterior superior scapular region      Active Range of Motion     Cervical Spine   Flexion: Active cervical flexion: 55.  Extension: within functional limits (45)  Left lateral flexion: Active left cervical lateral flexion: 30.  Right lateral flexion: Active right cervical lateral flexion: 35.  Left rotation: Active left cervical rotation: 45.  Right rotation: Active right cervical rotation: 60.  Left Shoulder   Flexion: 20 degrees   Extension: 20 degrees   Abduction: 20 degrees     Right Shoulder   Flexion: 180 degrees   Extension: 45 degrees   Abduction: 170 degrees   External rotation BTH: T2   Internal rotation BTB: T3     Passive Range of Motion   Left Shoulder   Flexion: 30 degrees   Extension: 25 degrees   Abduction: 25 degrees   External rotation 90°: 15 degrees   Internal rotation 90°: 25 degrees     Right Shoulder   Flexion: WFL  Extension: WFL  Abduction: WFL  Adduction: WFL  External rotation 90°: WFL  Internal rotation 90°: WFL    Strength:      Left Shoulder   Planes of Motion   Flexion: 3-   Extension: 3   Abduction: 3-   Adduction: 2+   External rotation at 90°: 3-   Internal rotation at 0°: 3-     Right Shoulder   Normal muscle strength        Therapeutic Exercises (CPT 86775):     1. Pendulums CW (L) shoulder standing using chair    2. Seated (L) shoulder flexion over table top /counter      Time-based treatments/modalities:    Physical Therapy Timed Treatment Charges  Therapeutic exercise minutes (CPT 68285): 10 minutes      Assessment, Response and Plan:   Impairments: impaired physical strength, lacks appropriate home exercise program, limited mobility and pain with function    Assessment details:  The patient is a 71 year old female who reports incidence of (L) proximal shoulder fracture which occurred 3-4 weeks ago while hiking and fell to her (L) side. She also indicated (L) hip fracture from fall. The patient is unable to use her (L) hand /UE to turn a jar cap and unable to lie on her (L) side. She cannot wash her back and  cannot anne a normal plate of food using the (L) hand.  Barriers to therapy:  None  Prognosis: good    Goals:   Short Term Goals:   1) Indep with HEP   2) Able to sleep on her (L) side 25% with less pain by 1-2 levels at night   3) Increase (L) shoulder flexion through PROM/AAROM by 10-15 deg    4)Reaches up above her shoulder and head using her (L) UE    Short term goal time span:  2-4 weeks      Long Term Goals:    1) Progression/regression advancing HEP   2) Applies pressure through (L) UE getting up and down from bed 50% or more   3) AROM increased to WFL and 50% or more less pain   4) Strength improved by 1MM grade or more in (L) sh flexion  5) Able to wash her back using her (L) UE /hand 76% of the time   Long term goal time span:  4-6 weeks    Plan:   Therapy options:  Physical therapy treatment to continue  Planned therapy interventions:  Functional Training, Self Care (CPT 29840), Neuromuscular Re-education (CPT 66603), Therapeutic Activities (CPT 00431) and Therapeutic Exercise (CPT 50070)  Frequency:  2x week  Duration in weeks:  6  Duration in visits:  12  Discussed with:  Patient      Functional Assessment Used        Referring provider co-signature:  I have reviewed this plan of care and my co-signature certifies the need for services.    Certification Period: 04/26/2021 to  06/07/21    Physician Signature: ________________________________ Date: ______________

## 2021-04-28 NOTE — OP THERAPY DAILY TREATMENT
"  Outpatient Physical Therapy  DAILY TREATMENT     [facility] Physical Therapy [address] [facility], [address]  [place] [address]  Phone:  [phone]    Date: 04/29/2021    Patient: Jacinda Patiño  YOB: 1950  MRN: [id number]     Time Calculation    Start time: 0800  Stop time: 0830 Time Calculation (min): 30 minutes         Chief Complaint: Shoulder Problem and Shoulder Injury    Visit #: 2    SUBJECTIVE:  The patient reports improved movement in the (L) UE being able to blow her nose and turn the jar caps in her hand. Has difficulty reaching out.      OBJECTIVE:  Current objective measures:       Increase PROM in (L) shoulder flexion, abduction and ER by 5-10 deg      Therapeutic Exercises (CPT 50743):     1. Pendulums CW (L) shoulder standing using chair, 20x    2. Seated (L) shoulder flexion over table top /counter, 1 minute, patient reluctant to move (L) UE; hard for her to extend arm down to floor    3. AAROM pulleys    4. Dowel sh flexion; abduction, ER    5. Prone (L) shoulder flexion , 1 minute,   \"   \"    6. Nu step bike    Therapeutic Treatments and Modalities:     1. Manual Therapy (CPT 26801), (L) shoulder, PROM in flexion, abduction and ER     Time-based treatments/modalities:    Physical Therapy Timed Treatment Charges  Manual therapy minutes (CPT 75592): 15 minutes  Therapeutic exercise minutes (CPT 37148): 15 minutes      ASSESSMENT:   Response to treatment:   STM to the (L) side of the upper traps; hypertonicity to the (L) upper trap to the superior aspect of the scapula; tightness adhesion present; recommended patient use a tennis ball in sock for self care for STM. PROM in (L) sh flexion, abduction, and ER; improved PROM in all by 5-10 deg within free of pain range of movemnt     PLAN/RECOMMENDATIONS:   Plan for treatment: therapy treatment to continue next visit.  Planned interventions for next visit: continue with current treatment.       "

## 2021-04-29 ENCOUNTER — PHYSICAL THERAPY (OUTPATIENT)
Dept: PHYSICAL THERAPY | Facility: REHABILITATION | Age: 71
End: 2021-04-29
Attending: ORTHOPAEDIC SURGERY
Payer: MEDICARE

## 2021-04-29 DIAGNOSIS — S42.202A TRAUMATIC CLOSED DISPLACED FRACTURE OF PROXIMAL END OF LEFT HUMERUS, INITIAL ENCOUNTER: ICD-10-CM

## 2021-04-29 PROCEDURE — 97110 THERAPEUTIC EXERCISES: CPT

## 2021-04-29 PROCEDURE — 97140 MANUAL THERAPY 1/> REGIONS: CPT

## 2021-05-04 NOTE — OP THERAPY DAILY TREATMENT
"  Outpatient Physical Therapy  DAILY TREATMENT     [facility] Out[facility] Physical Therapy [address] [facility], [address]  [place] [address]  Phone:  [phone]    Date: 05/05/2021    Patient: Jacinda Patiño  YOB: 1950  MRN: [id number]     Time Calculation    Start time: 0930  Stop time: 1000 Time Calculation (min): 30 minutes         Chief Complaint: Shoulder Problem    Visit #: 3    SUBJECTIVE:  The patient reports continued stiffness ans pain with occasional movements getting in out of chair at times.     OBJECTIVE:  Current objective measures:       Increase (L) cervical rotation (45 deg); increase PROM to the (L) shoulder ~30 deg    Therapeutic Exercises (CPT 78637):     1. Pendulums CW (L) shoulder standing using chair, 20x    2. Seated (L) shoulder flexion over table top /counter, 1 minute, patient reluctant to move (L) UE; hard for her to extend arm down to floor    3. AAROM pulleys    4. Dowel sh flexion; abduction, ER    5. Prone (L) shoulder flexion , 1 minute,   \"   \"    6. Nu step bike    7. (R) sidelying; (L) shoulder ER , 1 x10 reps    Therapeutic Treatments and Modalities:     1. Manual Therapy (CPT 59092), (L) shoulder, PROM in flexion, abduction and ER     2. Ultrasound(CPT 34113), (L) UE , US at 1.0 Mhz, 1.8 W/cm2 at 100% to the (L) proximal humerus for 7 minutes     Time-based treatments/modalities:    Physical Therapy Timed Treatment Charges  Manual therapy minutes (CPT 36475): 20 minutes  Therapeutic exercise minutes (CPT 88239): 10 minutes  Ultrasound minutes (CPT 75388): 7 minutes      ASSESSMENT:   Response to treatment:   PROM to the (L) shoulder in supine ~45 deg with increased pain at patient's end range; (L) shoulder abduction ~45 deg before pain symptoms; STM to the upper traps; increased adhesions to the (L) superior scapula region.      PLAN/RECOMMENDATIONS:   Plan for treatment: therapy treatment to continue next visit.  Planned interventions for next visit: continue " with current treatment.

## 2021-05-05 ENCOUNTER — PHYSICAL THERAPY (OUTPATIENT)
Dept: PHYSICAL THERAPY | Facility: REHABILITATION | Age: 71
End: 2021-05-05
Attending: ORTHOPAEDIC SURGERY
Payer: MEDICARE

## 2021-05-05 ENCOUNTER — OFFICE VISIT (OUTPATIENT)
Dept: SLEEP MEDICINE | Facility: MEDICAL CENTER | Age: 71
End: 2021-05-05
Payer: MEDICARE

## 2021-05-05 VITALS
BODY MASS INDEX: 24.83 KG/M2 | HEIGHT: 65 IN | SYSTOLIC BLOOD PRESSURE: 122 MMHG | HEART RATE: 74 BPM | OXYGEN SATURATION: 97 % | TEMPERATURE: 97.8 F | WEIGHT: 149 LBS | RESPIRATION RATE: 16 BRPM | DIASTOLIC BLOOD PRESSURE: 76 MMHG

## 2021-05-05 DIAGNOSIS — R06.02 SOB (SHORTNESS OF BREATH): ICD-10-CM

## 2021-05-05 DIAGNOSIS — J45.20 MILD INTERMITTENT ASTHMA WITHOUT COMPLICATION: ICD-10-CM

## 2021-05-05 DIAGNOSIS — S42.202A TRAUMATIC CLOSED DISPLACED FRACTURE OF PROXIMAL END OF LEFT HUMERUS, INITIAL ENCOUNTER: ICD-10-CM

## 2021-05-05 DIAGNOSIS — R05.9 COUGH: ICD-10-CM

## 2021-05-05 PROCEDURE — 97035 APP MDLTY 1+ULTRASOUND EA 15: CPT

## 2021-05-05 PROCEDURE — 99214 OFFICE O/P EST MOD 30 MIN: CPT | Performed by: INTERNAL MEDICINE

## 2021-05-05 PROCEDURE — 97140 MANUAL THERAPY 1/> REGIONS: CPT

## 2021-05-05 PROCEDURE — 97110 THERAPEUTIC EXERCISES: CPT

## 2021-05-05 RX ORDER — ONDANSETRON 4 MG/1
TABLET, FILM COATED ORAL
COMMUNITY
Start: 2021-04-23 | End: 2021-08-11

## 2021-05-05 RX ORDER — OXYCODONE HYDROCHLORIDE 5 MG/1
TABLET ORAL
COMMUNITY
Start: 2021-04-23 | End: 2021-08-11

## 2021-05-05 RX ORDER — IPRATROPIUM BROMIDE AND ALBUTEROL SULFATE 2.5; .5 MG/3ML; MG/3ML
3 SOLUTION RESPIRATORY (INHALATION) EVERY 6 HOURS PRN
Qty: 180 EACH | Refills: 4 | Status: SHIPPED | OUTPATIENT
Start: 2021-05-05

## 2021-05-05 ASSESSMENT — FIBROSIS 4 INDEX: FIB4 SCORE: 1.33

## 2021-05-05 ASSESSMENT — PAIN SCALES - GENERAL: PAINLEVEL: NO PAIN

## 2021-05-05 NOTE — PROGRESS NOTES
Chief Complaint   Patient presents with   • Follow-Up     Dypsnea       HPI:  The patient is a 71-year-old woman with a history of asthma that has been reasonably well-controlled.  Her medications include Breo 200, Flonase, Singulair, albuterol and DuoNeb.  The patient is also followed by allergy.  Has received Prevnar and Pneumovax.  And has also received her Covid vaccines as well as being up-to-date on influenza vaccination.  Prior pulmonary function studies showed an FEV1 of 2.33 L which is 96% of predicted.  FEV1 FVC ratio was 80%.  There was no response to an inhaled bronchodilator.  DLCO was 132% of predicted.  She feels that her asthma is in good control and she is not having any breathing issues.  However, she still has a feeling of thick mucus in the center of her chest.  This sensation has been helped with her nightly use of nebulized DuoNeb.  She has not been using Mucinex.  She recently had a fall with a fracture of her left humeral neck.  She is in a sling.  She has been taking occasional oxycodone to help sleep.  Her nasal symptoms are controlled with Flonase and alkalol.        Past Medical History:   Diagnosis Date   • HLD (hyperlipidemia) 7/7/2015   • Mild intermittent asthma without complication 7/19/2016       ROS:   Constitutional: Denies fevers, chills, night sweats, fatigue or weight loss  Eyes: Denies vision loss, pain, drainage, double vision  Ears, Nose, Throat: Denies earache, tinnitus, hoarseness  Cardiovascular: Denies chest pain, tightness, palpitations  Respiratory: See HPI  Sleep: Denies, snoring, apnea  GI: Denies abdominal pain, nausea, vomiting, diarrhea  : Denies frequent urination, hematuria, painful urination  Musculoskeletal: Denies back pain, painful joints, sore muscles  Neurological: Denies headaches, seizures  Skin: Denies rashes, color changes  Psychiatric: Denies depression or thoughts of suicide  Hematologic: Denies bleeding tendency or clotting  tendency  Allergic/Immunologic: Denies rhinitis, skin sensitivity    Social History     Socioeconomic History   • Marital status:      Spouse name: Not on file   • Number of children: Not on file   • Years of education: Not on file   • Highest education level: Not on file   Occupational History   • Occupation: Education      Comment: Retired   Tobacco Use   • Smoking status: Never Smoker   • Smokeless tobacco: Never Used   • Tobacco comment: 2nd hand exposure   Substance and Sexual Activity   • Alcohol use: No     Alcohol/week: 0.0 oz   • Drug use: No   • Sexual activity: Not on file   Other Topics Concern   • Not on file   Social History Narrative   • Not on file     Social Determinants of Health     Financial Resource Strain:    • Difficulty of Paying Living Expenses:    Food Insecurity:    • Worried About Running Out of Food in the Last Year:    • Ran Out of Food in the Last Year:    Transportation Needs:    • Lack of Transportation (Medical):    • Lack of Transportation (Non-Medical):    Physical Activity:    • Days of Exercise per Week:    • Minutes of Exercise per Session:    Stress:    • Feeling of Stress :    Social Connections:    • Frequency of Communication with Friends and Family:    • Frequency of Social Gatherings with Friends and Family:    • Attends Taoist Services:    • Active Member of Clubs or Organizations:    • Attends Club or Organization Meetings:    • Marital Status:    Intimate Partner Violence:    • Fear of Current or Ex-Partner:    • Emotionally Abused:    • Physically Abused:    • Sexually Abused:      Tape  Current Outpatient Medications on File Prior to Visit   Medication Sig Dispense Refill   • cyanocobalamin (VITAMIN B-12) 1000 MCG/ML Solution Inject 1,000 mcg into the shoulder, thigh, or buttocks every 30 days.     • fluticasone (FLONASE SENSIMIST) 27.5 MCG/SPRAY nasal spray Administer 2 Sprays into affected nostril(S) every day.     • BREO ELLIPTA 200-25 MCG/INH AEROSOL  POWDER, BREATH ACTIVATED USE 1 INHALATION DAILY (RINSE MOUTH AFTER USE) (Patient not taking: Reported on 4/4/2021) 3 Each 3   • montelukast (SINGULAIR) 10 MG Tab TAKE 1 TABLET DAILY (Patient taking differently: Take 10 mg by mouth at bedtime. TAKE 1 TABLET DAILY) 90 Tab 3   • omeprazole (PRILOSEC) 40 MG delayed-release capsule Take 1 Cap by mouth every day. 90 Cap 3   • simvastatin (ZOCOR) 20 MG Tab TAKE 1 TABLET EVERY EVENING (Patient taking differently: Take 20 mg by mouth every evening. TAKE 1 TABLET EVERY EVENING) 90 Tab 3   • Estradiol-Norethindrone Acet 0.5-0.1 MG Tab TAKE 1 TABLET DAILY ([place] mfr only please) (Patient taking differently: Take 1 tablet by mouth every day. TAKE 1 TABLET DAILY ([place] mfr only please)) 90 Tab 3   • naproxen (NAPROSYN) 250 MG Tab Take 500 mg by mouth as needed. Indications: Pain     • albuterol (PROAIR HFA) 108 (90 Base) MCG/ACT Aero Soln inhalation aerosol USE 2 INHALATIONS EVERY 6 HOURS AS NEEDED FOR SHORTNESS OF BREATH (Patient taking differently: Inhale 2 Puffs every day.) 25.5 g 3   • ipratropium-albuterol (DUONEB) 0.5-2.5 (3) MG/3ML nebulizer solution 3 mL by Nebulization route every 6 hours as needed for Shortness of Breath. (Patient taking differently: Take 3 mL by nebulization at bedtime.) 30 Bullet 4   • Lifitegrast (XIIDRA) 5 % Solution Administer 1 Drop into both eyes every day.     • Nasal Wash (ALKALOL) Solution Administer 1 Each into affected nostril(S) 2 times a day.       No current facility-administered medications on file prior to visit.     There were no vitals taken for this visit.  Family History   Problem Relation Age of Onset   • Heart Disease Mother    • Hyperlipidemia Mother    • Hypertension Mother    • Stroke Mother    • Other Father         Plane crash   • No Known Problems Sister    • Heart Attack Maternal Aunt    • Heart Attack Maternal Grandmother        Physical Exam:    HEENT: PERRLA, EOMI, no scleral icterus, no nasal or oral  lesions  Neck: No thyromegaly, no adenopathy, no bruits  Mallampatti: Grade II  Lungs: Equal breath sounds, no wheezes or crackles  Heart: Regular rate and rhythm, no gallops or murmurs  Abdomen: Soft, benign, no organomegaly  Extremities: No clubbing, cyanosis, or edema  Neurologic: Cranial nerve, motor, and sensory exam are normal    1. Mild intermittent asthma without complication    2. SOB (shortness of breath)      At this time her asthma is reasonably well controlled.  Today her mucous problems seem a little bit better and she ascribes that possibly to the use of her DuoNeb.  She may discontinue the DuoNeb at this point.  However, if her mucus again becomes a problem she will restart the DuoNeb.  I have also suggested using Mucinex.  She will keep her follow-up appointment that has been scheduled for August.

## 2021-05-06 NOTE — OP THERAPY DAILY TREATMENT
Outpatient Physical Therapy  DAILY TREATMENT     [facility] Physical Therapy [address] [facility], [address]  [place] [address]  Phone:  [phone]    Date: 05/07/2021    Patient: Jacinda Patiño  YOB: 1950  MRN: [id number]     Time Calculation    Start time: 0800  Stop time: 0830 Time Calculation (min): 30 minutes         Chief Complaint: Shoulder Problem and Shoulder Injury    Visit #: 4    SUBJECTIVE:  The patient reports having decreased pain the last couple of days; she is getting increased motion with her (L) UE. Patient has continued pain to the (L) side of the neck    OBJECTIVE:  Current objective measures:       Increase (L) shoulder flexion and abduction in AROM/AAROM       Therapeutic Exercises (CPT 34340):     1. Pendulums CW (L) shoulder standing using chair, 20x    2. Seated (L) shoulder flexion over table top /counter, 1 minute    3. AAROM pulleys    4. Dowel sh flexion; abduction, ER, 1 x10 reps     5. Prone (L) shoulder flexion     6. Nu step bike    7. (R) sidelying; (L) shoulder ER , 1 x10 reps    Therapeutic Treatments and Modalities:     1. Manual Therapy (CPT 22861), (L) shoulder, PROM in flexion, abduction and ER     2. Ultrasound(CPT 00635), (L) UE , US at 1.0 Mhz, 1.8 W/cm2 at 100% to the (L) proximal humerus for 7 minutes     Time-based treatments/modalities:    Physical Therapy Timed Treatment Charges  Manual therapy minutes (CPT 38518): 10 minutes  Therapeutic exercise minutes (CPT 13598): 20 minutes      ASSESSMENT:   Response to treatment:   STM to the (L) upper trapezius near superior scapula; tightness and tenderness present; patient performed AAROM using dowel for shoulder flexion and abduction. Patient increased (L) shoulder flexion to ~60 deg AAROM and ~ 45 (L) shoulder abduction but with pain in each end range position currently.    PLAN/RECOMMENDATIONS:   Plan for treatment: therapy treatment to continue next visit.  Planned interventions for next visit:  continue with current treatment.

## 2021-05-07 ENCOUNTER — PHYSICAL THERAPY (OUTPATIENT)
Dept: PHYSICAL THERAPY | Facility: REHABILITATION | Age: 71
End: 2021-05-07
Attending: ORTHOPAEDIC SURGERY
Payer: MEDICARE

## 2021-05-07 DIAGNOSIS — S42.202A TRAUMATIC CLOSED DISPLACED FRACTURE OF PROXIMAL END OF LEFT HUMERUS, INITIAL ENCOUNTER: ICD-10-CM

## 2021-05-07 PROCEDURE — 97140 MANUAL THERAPY 1/> REGIONS: CPT

## 2021-05-07 PROCEDURE — 97110 THERAPEUTIC EXERCISES: CPT

## 2021-05-10 ENCOUNTER — HOSPITAL ENCOUNTER (OUTPATIENT)
Dept: LAB | Facility: MEDICAL CENTER | Age: 71
End: 2021-05-10
Attending: FAMILY MEDICINE
Payer: MEDICARE

## 2021-05-10 LAB
BASOPHILS # BLD AUTO: 0.6 % (ref 0–1.8)
BASOPHILS # BLD: 0.04 K/UL (ref 0–0.12)
EOSINOPHIL # BLD AUTO: 0.1 K/UL (ref 0–0.51)
EOSINOPHIL NFR BLD: 1.6 % (ref 0–6.9)
ERYTHROCYTE [DISTWIDTH] IN BLOOD BY AUTOMATED COUNT: 43 FL (ref 35.9–50)
HCT VFR BLD AUTO: 35.2 % (ref 37–47)
HGB BLD-MCNC: 10.8 G/DL (ref 12–16)
IMM GRANULOCYTES # BLD AUTO: 0.03 K/UL (ref 0–0.11)
IMM GRANULOCYTES NFR BLD AUTO: 0.5 % (ref 0–0.9)
LYMPHOCYTES # BLD AUTO: 1.75 K/UL (ref 1–4.8)
LYMPHOCYTES NFR BLD: 27.6 % (ref 22–41)
MCH RBC QN AUTO: 21.7 PG (ref 27–33)
MCHC RBC AUTO-ENTMCNC: 30.7 G/DL (ref 33.6–35)
MCV RBC AUTO: 70.7 FL (ref 81.4–97.8)
MONOCYTES # BLD AUTO: 0.47 K/UL (ref 0–0.85)
MONOCYTES NFR BLD AUTO: 7.4 % (ref 0–13.4)
NEUTROPHILS # BLD AUTO: 3.96 K/UL (ref 2–7.15)
NEUTROPHILS NFR BLD: 62.3 % (ref 44–72)
NRBC # BLD AUTO: 0 K/UL
NRBC BLD-RTO: 0 /100 WBC
PLATELET # BLD AUTO: 289 K/UL (ref 164–446)
PMV BLD AUTO: 11.5 FL (ref 9–12.9)
RBC # BLD AUTO: 4.98 M/UL (ref 4.2–5.4)
WBC # BLD AUTO: 6.4 K/UL (ref 4.8–10.8)

## 2021-05-10 PROCEDURE — 83540 ASSAY OF IRON: CPT

## 2021-05-10 PROCEDURE — 85025 COMPLETE CBC W/AUTO DIFF WBC: CPT

## 2021-05-10 PROCEDURE — 36415 COLL VENOUS BLD VENIPUNCTURE: CPT

## 2021-05-10 PROCEDURE — 83550 IRON BINDING TEST: CPT

## 2021-05-10 PROCEDURE — 82728 ASSAY OF FERRITIN: CPT

## 2021-05-10 PROCEDURE — 80053 COMPREHEN METABOLIC PANEL: CPT

## 2021-05-11 LAB
ALBUMIN SERPL BCP-MCNC: 4.2 G/DL (ref 3.2–4.9)
ALBUMIN/GLOB SERPL: 1.8 G/DL
ALP SERPL-CCNC: 73 U/L (ref 30–99)
ALT SERPL-CCNC: 19 U/L (ref 2–50)
ANION GAP SERPL CALC-SCNC: 6 MMOL/L (ref 7–16)
AST SERPL-CCNC: 19 U/L (ref 12–45)
BILIRUB SERPL-MCNC: 0.5 MG/DL (ref 0.1–1.5)
BUN SERPL-MCNC: 14 MG/DL (ref 8–22)
CALCIUM SERPL-MCNC: 9.2 MG/DL (ref 8.5–10.5)
CHLORIDE SERPL-SCNC: 101 MMOL/L (ref 96–112)
CO2 SERPL-SCNC: 28 MMOL/L (ref 20–33)
CREAT SERPL-MCNC: 0.59 MG/DL (ref 0.5–1.4)
FERRITIN SERPL-MCNC: 49.4 NG/ML (ref 10–291)
GLOBULIN SER CALC-MCNC: 2.4 G/DL (ref 1.9–3.5)
GLUCOSE SERPL-MCNC: 95 MG/DL (ref 65–99)
IRON SATN MFR SERPL: 29 % (ref 15–55)
IRON SERPL-MCNC: 83 UG/DL (ref 40–170)
POTASSIUM SERPL-SCNC: 3.7 MMOL/L (ref 3.6–5.5)
PROT SERPL-MCNC: 6.6 G/DL (ref 6–8.2)
SODIUM SERPL-SCNC: 135 MMOL/L (ref 135–145)
TIBC SERPL-MCNC: 284 UG/DL (ref 250–450)
UIBC SERPL-MCNC: 201 UG/DL (ref 110–370)

## 2021-05-11 NOTE — OP THERAPY DAILY TREATMENT
Outpatient Physical Therapy  DAILY TREATMENT     [facility] Outpatient Physical Therapy [address] [facility], [address]  [place] [address]  Phone:  [phone]    Date: 05/12/2021    Patient: Jacinda Patiño  YOB: 1950  MRN: [id number]     Time Calculation    Start time: 0930  Stop time: 1000 Time Calculation (min): 30 minutes         Chief Complaint: Shoulder Problem and Shoulder Injury    Visit #: 5    SUBJECTIVE:  The patient reports having some pain since last treatment trying to move the arm over the weekend. She is currently taking Tylenol and started taking Gabapentin nerve pain. She is able to put face lotion on her face.      OBJECTIVE:  Current objective measures:       Increase AROM in (L) sh extension     Therapeutic Exercises (CPT 00105):     1. Pendulums CW (L) shoulder standing using chair, 20x    2. Seated (L) shoulder flexion over table top /counter, 1 minute    3. AAROM pulleys, 20x , shoulder flexion, abduction    4. Dowel sh flexion; abduction, ER, 1 x10 reps     5. Prone (L) shoulder flexion     6. Nu step bike    7. (R) sidelying; (L) shoulder ER , 1 x10 reps    8. Standing dowel extension , 20x    Therapeutic Treatments and Modalities:     1. Manual Therapy (CPT 16598), (L) shoulder, PROM in flexion, abduction and ER     2. Ultrasound(CPT 58781), (L) UE , US at 1.0 Mhz, 1.8 W/cm2 at 100% to the (L) proximal humerus for 10 minutes     Time-based treatments/modalities:    Physical Therapy Timed Treatment Charges  Manual therapy minutes (CPT 90911): 5 minutes  Therapeutic activity minutes (CPT 16286): 15 minutes  Ultrasound minutes (CPT 41382): 10 minutes    ASSESSMENT:   Response to treatment:   Performed AAROM to the (L) shoulder but has fear avoidance in (L) sh flexion and in (L) sh abduction. Very limited ROM. The patient is very sensitive to AROM with manual therapy to the (L) UE in all directions of PROM.    PLAN/RECOMMENDATIONS:   Plan for treatment: continue with current  treatment  Planned interventions for next visit: continue with current treatment.

## 2021-05-12 ENCOUNTER — PHYSICAL THERAPY (OUTPATIENT)
Dept: PHYSICAL THERAPY | Facility: REHABILITATION | Age: 71
End: 2021-05-12
Attending: ORTHOPAEDIC SURGERY
Payer: MEDICARE

## 2021-05-12 DIAGNOSIS — S42.202A TRAUMATIC CLOSED DISPLACED FRACTURE OF PROXIMAL END OF LEFT HUMERUS, INITIAL ENCOUNTER: ICD-10-CM

## 2021-05-12 PROCEDURE — 97530 THERAPEUTIC ACTIVITIES: CPT

## 2021-05-12 PROCEDURE — 97035 APP MDLTY 1+ULTRASOUND EA 15: CPT

## 2021-05-13 NOTE — OP THERAPY DAILY TREATMENT
Outpatient Physical Therapy  DAILY TREATMENT     [facility] Outpatient Physical Therapy [address] [facility], [address]  [place] [address]  Phone:  [phone]    Date: 05/14/2021    Patient: Jacinda Patiño  YOB: 1950  MRN: [id number]     Time Calculation    Start time: 0930  Stop time: 1000 Time Calculation (min): 30 minutes         Chief Complaint: Shoulder Problem    Visit #: 6    SUBJECTIVE:  The patient reports being able to put a top on easier. She took a Gabapentin for her nerve pain in the shoulder.    OBJECTIVE:  Current objective measures:       Increase (L) shoulder AROM in flexion, abduction and IR     Therapeutic Exercises (CPT 21357):     1. Pendulums CW (L) shoulder standing using chair, 20x    2. Seated (L) shoulder flexion over table top /counter, 1 minute    3. AAROM pulleys, 20x , shoulder flexion, abduction    4. Dowel sh flexion; abduction, ER, 1 x10 reps     5. Prone (L) shoulder flexion     6. Nu step bike    7. (R) sidelying; (L) shoulder ER , 1 x10 reps    8. Standing dowel extension , 20x    Therapeutic Treatments and Modalities:     1. Manual Therapy (CPT 18964), (L) shoulder, PROM in flexion, abduction and ER     2. Ultrasound(CPT 53481), (L) UE , US at 1.0 Mhz, 1.8 W/cm2 at 100% to the (L) proximal humerus for 10 minutes , No US today    Time-based treatments/modalities:    Physical Therapy Timed Treatment Charges  Manual therapy minutes (CPT 02536): 10 minutes  Therapeutic exercise minutes (CPT 83535): 20 minutes  ASSESSMENT:     Response to treatment:   Patient unable to perform AROM with (L) shoulder with any increase in mobility during demonstration of use of dowel motion. The patient had increased pain with manual cervical traction to the (L) UE. She was displaying hyper-responsive reactions to (L) sh movements and was being more non-compliant with her actions toward exercises reporting pain with numerous attempts to alleviate her condition. Patient was given HEP  reference and asked to perform her exercises more frequently. Informed patient that signs of progress are needed in physical therapy in order to continue treatment. Patient understood and will attempt more efforts at home to perform her HEP.    PLAN/RECOMMENDATIONS:   Plan for treatment: therapy treatment to continue next visit.  Planned interventions for next visit: continue with current treatment.

## 2021-05-14 ENCOUNTER — PHYSICAL THERAPY (OUTPATIENT)
Dept: PHYSICAL THERAPY | Facility: REHABILITATION | Age: 71
End: 2021-05-14
Attending: ORTHOPAEDIC SURGERY
Payer: MEDICARE

## 2021-05-14 DIAGNOSIS — S42.202A TRAUMATIC CLOSED DISPLACED FRACTURE OF PROXIMAL END OF LEFT HUMERUS, INITIAL ENCOUNTER: ICD-10-CM

## 2021-05-14 PROCEDURE — 97110 THERAPEUTIC EXERCISES: CPT

## 2021-05-14 PROCEDURE — 97140 MANUAL THERAPY 1/> REGIONS: CPT

## 2021-05-19 ENCOUNTER — PHYSICAL THERAPY (OUTPATIENT)
Dept: PHYSICAL THERAPY | Facility: REHABILITATION | Age: 71
End: 2021-05-19
Attending: ORTHOPAEDIC SURGERY
Payer: MEDICARE

## 2021-05-19 DIAGNOSIS — S42.202A TRAUMATIC CLOSED DISPLACED FRACTURE OF PROXIMAL END OF LEFT HUMERUS, INITIAL ENCOUNTER: ICD-10-CM

## 2021-05-19 PROCEDURE — 97110 THERAPEUTIC EXERCISES: CPT

## 2021-05-19 NOTE — OP THERAPY DAILY TREATMENT
Outpatient Physical Therapy  DAILY TREATMENT     [facility] Physical Therapy [place] [address] [facility], [address]  [place] [address]  Phone:  [phone]    Date: 05/19/2021    Patient: Jacinda Patiño  YOB: 1950  MRN: [id number]     Time Calculation    Start time: 0930  Stop time: 1010 Time Calculation (min): 40 minutes         Chief Complaint: Shoulder Problem    Visit #: 7    SUBJECTIVE:  The patient reports on Sunday she had some pain to the (L) shoulder with movement getting in and out of position. Patient reports being able to wipe her face using her (L) hand    OBJECTIVE:  Current objective measures:       Increase AROM in (L) sh flexion and abduction     Therapeutic Exercises (CPT 34035):     1. Pendulums CW (L) shoulder standing using chair, 20x    2. Seated (L) shoulder flexion over table top /counter, 1 minute    3. AAROM pulleys, 20x , shoulder flexion, abduction    4. Dowel sh flexion; abduction, ER, 1 x10 reps     5. Modified (L) shoulder flexion , 3# free weight in hand, standing     6. Nu step bike    7. (R) sidelying; (L) shoulder ER , 1 x10 reps    8. Standing dowel extension , 20x    Therapeutic Treatments and Modalities:     1. Manual Therapy (CPT 67034), (L) shoulder, PROM in flexion, abduction and ER     2. Ultrasound(CPT 89032), (L) UE , US at 1.0 Mhz, 1.8 W/cm2 at 100% to the (L) proximal humerus for 10 minutes     Time-based treatments/modalities:    Physical Therapy Timed Treatment Charges  Therapeutic exercise minutes (CPT 04250): 30 minutes  Ultrasound minutes (CPT 54884): 10 minutes      ASSESSMENT:   Response to treatment:   Performed AAROM using pulleys with (L) UE limited in shoulder flexion to ~45 deg due to pain; patient has limitations in (L) sh abduction as well diue to pain. Patient used dowel for flexion, abduction, ER and extension. Limited with AROM in (L) UE due to pain. Patient unable to lie on (L) side and perform sleeper's stretch; can only lift arm to  ~45 deg sidelying to perform stretch.  Patient performed modified (L) sh flexion using chair for support; tolerated with some improved motion with/without free weight.    PLAN/RECOMMENDATIONS:   Plan for treatment: therapy treatment to continue next visit.  Planned interventions for next visit: continue with current treatment.

## 2021-05-20 ENCOUNTER — HOSPITAL ENCOUNTER (OUTPATIENT)
Dept: LAB | Facility: MEDICAL CENTER | Age: 71
End: 2021-05-20
Attending: FAMILY MEDICINE
Payer: MEDICARE

## 2021-05-20 LAB
CHOLEST SERPL-MCNC: 186 MG/DL (ref 100–199)
FASTING STATUS PATIENT QL REPORTED: NORMAL
HDLC SERPL-MCNC: 67 MG/DL
LDLC SERPL CALC-MCNC: 103 MG/DL
TRIGL SERPL-MCNC: 80 MG/DL (ref 0–149)

## 2021-05-20 PROCEDURE — 80061 LIPID PANEL: CPT

## 2021-05-20 PROCEDURE — 36415 COLL VENOUS BLD VENIPUNCTURE: CPT

## 2021-05-20 NOTE — OP THERAPY DAILY TREATMENT
Outpatient Physical Therapy  DAILY TREATMENT     [facility] Physical Therapy [address] [facility], [address]  [place] [address]  Phone:  [phone]    Date: 05/21/2021    Patient: Jacinda Patiño  YOB: 1950  MRN: [id number]     Time Calculation    Start time: 0930  Stop time: 1000 Time Calculation (min): 30 minutes         Chief Complaint: Shoulder Problem    Visit #: 8    SUBJECTIVE:  The patient reports continued pain in (R) shoulder    OBJECTIVE:  Current objective measures:       Increase flexion, extension, abduction in (L) shoulder AAROM/AROM    Therapeutic Exercises (CPT 01472):     1. Pendulums CW (L) shoulder standing using chair, 20x    2. Seated (L) shoulder flexion over table top /counter, 1 minute    3. AAROM pulleys, 20x , shoulder flexion, abduction    4. Dowel sh flexion; abduction, ER, 1 x10 reps     5. Modified (L) shoulder flexion , 3# free weight in hand, standing     6. Nu step bike    7. (R) sidelying; (L) shoulder ER , 1 x10 reps    8. Standing dowel extension , 20x    Therapeutic Treatments and Modalities:     1. Manual Therapy (CPT 86083), (L) shoulder, PROM in flexion, abduction and ER     Time-based treatments/modalities:    Physical Therapy Timed Treatment Charges  Therapeutic exercise minutes (CPT 97444): 30 minutes      Pain rating (1-10) before treatment:  Pain rating (1-10) after treatment:      ASSESSMENT:   Response to treatment:   Warm up with dowel in (R) shoulder flexion ~50-55 deg; (R) sh abduction ~45 deg; extension ~35 deg in AAROM    PLAN/RECOMMENDATIONS:   Plan for treatment: therapy treatment to continue next visit.  Planned interventions for next visit: continue with current treatment.

## 2021-05-21 ENCOUNTER — PHYSICAL THERAPY (OUTPATIENT)
Dept: PHYSICAL THERAPY | Facility: REHABILITATION | Age: 71
End: 2021-05-21
Attending: ORTHOPAEDIC SURGERY
Payer: MEDICARE

## 2021-05-21 DIAGNOSIS — S42.202A TRAUMATIC CLOSED DISPLACED FRACTURE OF PROXIMAL END OF LEFT HUMERUS, INITIAL ENCOUNTER: ICD-10-CM

## 2021-05-21 PROCEDURE — 97110 THERAPEUTIC EXERCISES: CPT

## 2021-05-25 NOTE — OP THERAPY DAILY TREATMENT
Outpatient Physical Therapy  DAILY TREATMENT     [facility] Physical Therapy [place] [address] [facility], [address]  [place] [address]  Phone:  [phone]    Date: 05/26/2021    Patient: Jacinda Patiño  YOB: 1950  MRN: [id number]     Time Calculation    Start time: 0930  Stop time: 1000 Time Calculation (min): 30 minutes         Chief Complaint: Shoulder Injury and Shoulder Problem    Visit #: 9    SUBJECTIVE:  The patient reports feeling uncomfortable on Saturday but continued Sunday and Monday felt better with movement with the (L) UE.     OBJECTIVE:  Current objective measures:       Increase (L) shoulder PROM in flexion; abduction and ER/IR; increase AAROM with pulleys     Therapeutic Exercises (CPT 45096):     1. Pendulums CW (L) shoulder standing using chair; vertical/horizontal motions, 20x    2. Seated (L) shoulder flexion over table top /counter, 1 minute    3. AAROM pulleys, 20x , shoulder flexion, abduction    4. Dowel sh flexion; abduction, ER, 1 x10 reps     5. Modified (L) shoulder flexion , 3# free weight in hand, standing     6. Nu step bike    7. (R) sidelying; (L) shoulder ER , 1 x10 reps    8. Standing dowel extension , 20x    Therapeutic Treatments and Modalities:     1. Manual Therapy (CPT 63756), (L) shoulder, PROM in flexion, abduction and ER limited with PROM    2. Manual Therapy (CPT 17675), cervical-thoracic, STM to the (L) rhomboids, upper traps    Time-based treatments/modalities:    Physical Therapy Timed Treatment Charges  Manual therapy minutes (CPT 40677): 10 minutes  Therapeutic exercise minutes (CPT 88579): 20 minutes      ASSESSMENT:   Response to treatment:   AAROM with (L) shoulder still limited and painful with ~50 deg (L) shoulder flexion, ~45 deg abduction, 35 deg ER at 30-45 deg in vertical plane with patient standing     PLAN/RECOMMENDATIONS:   Plan for treatment: therapy treatment to continue next visit.  Planned interventions for next visit: continue  with current treatment.

## 2021-05-26 ENCOUNTER — PHYSICAL THERAPY (OUTPATIENT)
Dept: PHYSICAL THERAPY | Facility: REHABILITATION | Age: 71
End: 2021-05-26
Attending: ORTHOPAEDIC SURGERY
Payer: MEDICARE

## 2021-05-26 DIAGNOSIS — S42.202A TRAUMATIC CLOSED DISPLACED FRACTURE OF PROXIMAL END OF LEFT HUMERUS, INITIAL ENCOUNTER: ICD-10-CM

## 2021-05-26 PROCEDURE — 97140 MANUAL THERAPY 1/> REGIONS: CPT

## 2021-05-26 PROCEDURE — 97110 THERAPEUTIC EXERCISES: CPT

## 2021-05-27 NOTE — OP THERAPY DAILY TREATMENT
Outpatient Physical Therapy  DAILY TREATMENT     [facility] Outpatient Physical Therapy [place] [address] [address] [facility], [address]  [place] [address]  Phone:  [phone]    Date: 05/28/2021    Patient: Jacinda Patiño  YOB: 1950  MRN: [id number]     Time Calculation    Start time: 0800  Stop time: 0830 Time Calculation (min): 30 minutes         Chief Complaint: Shoulder Injury and Shoulder Problem    Visit #: 10    SUBJECTIVE:  The patient reports doing her exercises at home; she still has very limited AROM in (L) shoulder.    OBJECTIVE:  Current objective measures:       Decrease (L) upper trap tightness; improve AROM in (L) shoulder     Therapeutic Exercises (CPT 69282):     1. Pendulums CW (L) shoulder standing using chair; vertical/horizontal motions, 20x    2. Seated (L) shoulder flexion over table top /counter, 1 minute    3. AAROM pulleys, 20x , (L) shoulder flexion, abduction, ER/IR    4. Dowel sh flexion; abduction, ER, 1 x10 reps     5. Modified (L) shoulder flexion , 3# free weight in hand, standing     6. Nu step bike    7. (R) sidelying; (L) shoulder ER , 1 x10 reps    8. Standing dowel extension , 20x    Therapeutic Treatments and Modalities:     1. Manual Therapy (CPT 29026), (L) shoulder, PROM in flexion, abduction and ER limited with PROM    2. Manual Therapy (CPT 13561), cervical-thoracic, STM to the (L) rhomboids, upper traps, scalenes    Time-based treatments/modalities:    Physical Therapy Timed Treatment Charges  Manual therapy minutes (CPT 80565): 10 minutes  Therapeutic exercise minutes (CPT 68796): 20 minutes      ASSESSMENT:   Response to treatment:   STM to the upper (L) posterior upper traps; soreness stiffness upon pressure; increased connective tissue tension present to (L) upper -mid traps, rhomboids and scalenes; PROM/AROM limited in (L) shoulder flexion ~50 deg; (L) sh abduction ~45 deg, (L) shoulder extension ~40-45 deg; patient has made very little gains in mobility due to  pain. Patient will follow up with orthopedics in ~1 week.     PLAN/RECOMMENDATIONS:   Plan for treatment: therapy treatment to continue next visit.  Planned interventions for next visit: continue with current treatment.

## 2021-05-28 ENCOUNTER — PHYSICAL THERAPY (OUTPATIENT)
Dept: PHYSICAL THERAPY | Facility: REHABILITATION | Age: 71
End: 2021-05-28
Attending: ORTHOPAEDIC SURGERY
Payer: MEDICARE

## 2021-05-28 DIAGNOSIS — S42.202A TRAUMATIC CLOSED DISPLACED FRACTURE OF PROXIMAL END OF LEFT HUMERUS, INITIAL ENCOUNTER: ICD-10-CM

## 2021-05-28 PROCEDURE — 97140 MANUAL THERAPY 1/> REGIONS: CPT

## 2021-05-28 PROCEDURE — 97110 THERAPEUTIC EXERCISES: CPT

## 2021-05-28 NOTE — OP THERAPY DISCHARGE SUMMARY
Outpatient Physical Therapy  DISCHARGE SUMMARY NOTE      [facility] Physical Therapy [place] [id number] [facility], [address]  [facility] [address]  Phone:  [phone]    Date of Visit: 05/28/2021    Patient: Jacinda Patiño  YOB: 1950  MRN: [id number]     Referring Provider: Valdez Pete M.D.  [address] [place] [address]   Referring Diagnosis Other nondisplaced fracture of upper end of left humerus, initial encounter for closed fracture [S42.295A]         Functional Assessment Used        Your patient is being discharged from Physical Therapy with the following comments:   · Goals not met    Comments:  Patient attended physical therapy for 5 weeks with limited gains in PROM/AROM and functional activity with the (L) shoulder     Limitations Remaining:  PROM/AROM limited in (L) shoulder flexion ~50 deg; (L) sh abduction ~45 deg, (L) shoulder extension ~40-45 deg; patient has made very little gains in mobility due to pain    Recommendations:  Follow up with Orthopedics and PCP for further POC in the future.    Eriberto Salmon, PT    Date: 5/28/2021

## 2021-07-09 ENCOUNTER — APPOINTMENT (RX ONLY)
Dept: URBAN - METROPOLITAN AREA CLINIC 36 | Facility: CLINIC | Age: 71
Setting detail: DERMATOLOGY
End: 2021-07-09

## 2021-07-09 DIAGNOSIS — Z41.9 ENCOUNTER FOR PROCEDURE FOR PURPOSES OTHER THAN REMEDYING HEALTH STATE, UNSPECIFIED: ICD-10-CM

## 2021-07-09 PROCEDURE — ? BOTOX

## 2021-07-09 NOTE — PROCEDURE: BOTOX
Depressor Anguli Oris Units: 0
Show Levator Superior Units: Yes
Show Right And Left Brow Units: No
Additional Area 2 Location: chin
Periorbital Skin Units: 20
Additional Area 1 Units: 4
Forehead Units: 10
Lot #: [id number]
Consent: Written consent obtained. Risks include but not limited to lid/brow ptosis, bruising, swelling, diplopia, temporary effect, incomplete chemical denervation.
Additional Area 1 Location: upper lip
Expiration Date (Month Year): 11/2023
Additional Area 3 Location: masseter
Detail Level: Detailed
Dilution (U/0.1 Cc): 0.6
Post-Care Instructions: Patient instructed to not lie down for 4 hours and limit physical activity for 24 hours. Patient instructed not to travel by airplane for 48 hours.

## 2021-07-23 ENCOUNTER — HOSPITAL ENCOUNTER (OUTPATIENT)
Dept: LAB | Facility: MEDICAL CENTER | Age: 71
End: 2021-07-23
Attending: FAMILY MEDICINE
Payer: MEDICARE

## 2021-07-23 LAB
T3 SERPL-MCNC: 91.1 NG/DL (ref 60–181)
T4 FREE SERPL-MCNC: 1.3 NG/DL (ref 0.93–1.7)
TSH SERPL DL<=0.005 MIU/L-ACNC: 1.91 UIU/ML (ref 0.38–5.33)

## 2021-07-23 PROCEDURE — 36415 COLL VENOUS BLD VENIPUNCTURE: CPT

## 2021-07-23 PROCEDURE — 84480 ASSAY TRIIODOTHYRONINE (T3): CPT

## 2021-07-23 PROCEDURE — 84439 ASSAY OF FREE THYROXINE: CPT

## 2021-07-23 PROCEDURE — 84443 ASSAY THYROID STIM HORMONE: CPT

## 2021-08-11 ENCOUNTER — APPOINTMENT (OUTPATIENT)
Dept: RADIOLOGY | Facility: IMAGING CENTER | Age: 71
End: 2021-08-11
Attending: NURSE PRACTITIONER
Payer: MEDICARE

## 2021-08-11 ENCOUNTER — OFFICE VISIT (OUTPATIENT)
Dept: URGENT CARE | Facility: CLINIC | Age: 71
End: 2021-08-11
Payer: MEDICARE

## 2021-08-11 VITALS
TEMPERATURE: 98 F | SYSTOLIC BLOOD PRESSURE: 128 MMHG | OXYGEN SATURATION: 97 % | HEART RATE: 77 BPM | WEIGHT: 145 LBS | RESPIRATION RATE: 17 BRPM | DIASTOLIC BLOOD PRESSURE: 78 MMHG | HEIGHT: 64 IN | BODY MASS INDEX: 24.75 KG/M2

## 2021-08-11 DIAGNOSIS — R07.9 CHEST PAIN, UNSPECIFIED TYPE: ICD-10-CM

## 2021-08-11 PROCEDURE — 93000 ELECTROCARDIOGRAM COMPLETE: CPT | Performed by: NURSE PRACTITIONER

## 2021-08-11 PROCEDURE — 99213 OFFICE O/P EST LOW 20 MIN: CPT | Performed by: NURSE PRACTITIONER

## 2021-08-11 PROCEDURE — 71046 X-RAY EXAM CHEST 2 VIEWS: CPT | Mod: TC | Performed by: NURSE PRACTITIONER

## 2021-08-11 ASSESSMENT — FIBROSIS 4 INDEX: FIB4 SCORE: 1.09

## 2021-08-11 NOTE — PROGRESS NOTES
Subjective:      Jacinda Patiño is a 71 y.o. female who presents with Chest Pressure (chest pressure that makes it difficult to breathe when laying down , has been trrying to treat with albuteral inhaler but little to no relief )    Past Medical History:   Diagnosis Date   • HLD (hyperlipidemia) 7/7/2015   • Mild intermittent asthma without complication 7/19/2016     Social History     Socioeconomic History   • Marital status:      Spouse name: Not on file   • Number of children: Not on file   • Years of education: Not on file   • Highest education level: Not on file   Occupational History   • Occupation: Education      Comment: Retired   Tobacco Use   • Smoking status: Never Smoker   • Smokeless tobacco: Never Used   • Tobacco comment: 2nd hand exposure   Vaping Use   • Vaping Use: Never used   Substance and Sexual Activity   • Alcohol use: No     Alcohol/week: 0.0 oz   • Drug use: No   • Sexual activity: Not on file   Other Topics Concern   • Not on file   Social History Narrative   • Not on file     Social Determinants of Health     Financial Resource Strain:    • Difficulty of Paying Living Expenses:    Food Insecurity:    • Worried About Running Out of Food in the Last Year:    • Ran Out of Food in the Last Year:    Transportation Needs:    • Lack of Transportation (Medical):    • Lack of Transportation (Non-Medical):    Physical Activity:    • Days of Exercise per Week:    • Minutes of Exercise per Session:    Stress:    • Feeling of Stress :    Social Connections:    • Frequency of Communication with Friends and Family:    • Frequency of Social Gatherings with Friends and Family:    • Attends Church Services:    • Active Member of Clubs or Organizations:    • Attends Club or Organization Meetings:    • Marital Status:    Intimate Partner Violence:    • Fear of Current or Ex-Partner:    • Emotionally Abused:    • Physically Abused:    • Sexually Abused:      Family History   Problem Relation Age  "of Onset   • Heart Disease Mother    • Hyperlipidemia Mother    • Hypertension Mother    • Stroke Mother    • Other Father         Plane crash   • No Known Problems Sister    • Heart Attack Maternal Aunt    • Heart Attack Maternal Grandmother        Allergies: Tape    Patient is a 71-year-old female who presents today with complaint of intermittent chest pain at night.  She describes this as pressure and discomfort with a feeling of shortness of breath.  She states this started over the last several weeks.  Her primary care provider advised her to come to urgent care today for this issue.  Patient states she also has an appointment with a cardiologist on September 9.  Patient states discomfort is intermittent and usually happens only in the evening but makes it difficult for her to lie flat on her back as she develops a feeling of pressure.  Patient states she does have a history of asthma and has tried albuterol with mild relief for short period of time.        Other  This is a new problem. The current episode started in the past 7 days. The problem occurs constantly. The problem has been unchanged. Nothing aggravates the symptoms. She has tried nothing for the symptoms. The treatment provided no relief.       Review of Systems   All other systems reviewed and are negative.         Objective:     /78 (BP Location: Left arm, Patient Position: Sitting, BP Cuff Size: Adult)   Pulse 77   Temp 36.7 °C (98 °F) (Temporal)   Resp 17   Ht 1.626 m (5' 4\")   Wt 65.8 kg (145 lb)   SpO2 97%   BMI 24.89 kg/m²      Physical Exam  Vitals reviewed.   Constitutional:       Appearance: Normal appearance.   Cardiovascular:      Rate and Rhythm: Normal rate and regular rhythm.      Heart sounds: Normal heart sounds.   Pulmonary:      Effort: Pulmonary effort is normal. No respiratory distress.      Breath sounds: Normal breath sounds. No stridor. No wheezing, rhonchi or rales.   Chest:      Chest wall: No tenderness. "   Skin:     General: Skin is warm and dry.   Neurological:      Mental Status: She is alert and oriented to person, place, and time.   Psychiatric:         Mood and Affect: Mood normal.         Behavior: Behavior normal.       EKG: Normal sinus rhythm.  No ST elevation or depression noted.  No ischemia.  No ectopic rhythms.    XR chest:    8/11/2021 2:56 PM     HISTORY/REASON FOR EXAM:  Chest Pain and pressure.  Dyspnea.     TECHNIQUE/EXAM DESCRIPTION AND NUMBER OF VIEWS:  Two views of the chest.     COMPARISON:  5/23/2018     FINDINGS:  Cardiomediastinal contour is within normal limits.  Atherosclerotic calcification of thoracic aorta.  No focal pulmonary consolidation.  No pleural fluid collection or pneumothorax.  Degenerative change of thoracic spine.     IMPRESSION:     No acute cardiopulmonary disease.                 Assessment/Plan:   Intermittent chest pain    Keep follow-up appointment with cardiologist  Strongly advised patient to go to ER for persistent symptoms  Call or return to urgent care otherwise for any further questions or concerns     There are no diagnoses linked to this encounter.

## 2021-08-16 ENCOUNTER — HOSPITAL ENCOUNTER (OUTPATIENT)
Dept: RADIOLOGY | Facility: MEDICAL CENTER | Age: 71
End: 2021-08-16
Attending: FAMILY MEDICINE
Payer: MEDICARE

## 2021-08-16 DIAGNOSIS — R92.1 BREAST CALCIFICATION, LEFT: ICD-10-CM

## 2021-08-16 PROCEDURE — G0279 TOMOSYNTHESIS, MAMMO: HCPCS

## 2021-08-23 DIAGNOSIS — J45.20 MILD INTERMITTENT ASTHMA WITHOUT COMPLICATION: ICD-10-CM

## 2021-08-23 RX ORDER — ALBUTEROL SULFATE 90 UG/1
AEROSOL, METERED RESPIRATORY (INHALATION)
Qty: 3 EACH | Refills: 3 | Status: SHIPPED | OUTPATIENT
Start: 2021-08-23 | End: 2022-05-12 | Stop reason: SDUPTHER

## 2021-08-23 NOTE — TELEPHONE ENCOUNTER
Have we ever prescribed this med? Yes.  If yes, what date? 11/16/20    Last OV: 05/05/21 with Dr Huang    Next OV: 08/26/21 with Dr Sims    DX: Mild intermittent asthma without complication (J45.20)    Medications:   Requested Prescriptions     Pending Prescriptions Disp Refills   • albuterol (PROAIR HFA) 108 (90 Base) MCG/ACT Aero Soln inhalation aerosol 3 Each 3     Sig: USE 2 INHALATIONS EVERY 6 HOURS AS NEEDED FOR SHORTNESS OF BREATH

## 2021-08-25 ENCOUNTER — APPOINTMENT (RX ONLY)
Dept: URBAN - METROPOLITAN AREA CLINIC 4 | Facility: CLINIC | Age: 71
Setting detail: DERMATOLOGY
End: 2021-08-25

## 2021-08-25 DIAGNOSIS — D18.0 HEMANGIOMA: ICD-10-CM

## 2021-08-25 DIAGNOSIS — Z71.89 OTHER SPECIFIED COUNSELING: ICD-10-CM

## 2021-08-25 DIAGNOSIS — L73.9 FOLLICULAR DISORDER, UNSPECIFIED: ICD-10-CM | Status: RESOLVING

## 2021-08-25 DIAGNOSIS — L663 OTHER SPECIFIED DISEASES OF HAIR AND HAIR FOLLICLES: ICD-10-CM | Status: RESOLVING

## 2021-08-25 DIAGNOSIS — L738 OTHER SPECIFIED DISEASES OF HAIR AND HAIR FOLLICLES: ICD-10-CM | Status: RESOLVING

## 2021-08-25 DIAGNOSIS — D22 MELANOCYTIC NEVI: ICD-10-CM

## 2021-08-25 DIAGNOSIS — L57.0 ACTINIC KERATOSIS: ICD-10-CM

## 2021-08-25 DIAGNOSIS — Z85.828 PERSONAL HISTORY OF OTHER MALIGNANT NEOPLASM OF SKIN: ICD-10-CM

## 2021-08-25 DIAGNOSIS — L82.1 OTHER SEBORRHEIC KERATOSIS: ICD-10-CM

## 2021-08-25 DIAGNOSIS — L81.4 OTHER MELANIN HYPERPIGMENTATION: ICD-10-CM

## 2021-08-25 DIAGNOSIS — L82.0 INFLAMED SEBORRHEIC KERATOSIS: ICD-10-CM

## 2021-08-25 PROBLEM — D22.5 MELANOCYTIC NEVI OF TRUNK: Status: ACTIVE | Noted: 2021-08-25

## 2021-08-25 PROBLEM — D18.01 HEMANGIOMA OF SKIN AND SUBCUTANEOUS TISSUE: Status: ACTIVE | Noted: 2021-08-25

## 2021-08-25 PROBLEM — L02.426 FURUNCLE OF LEFT LOWER LIMB: Status: ACTIVE | Noted: 2021-08-25

## 2021-08-25 PROBLEM — D48.5 NEOPLASM OF UNCERTAIN BEHAVIOR OF SKIN: Status: ACTIVE | Noted: 2021-08-25

## 2021-08-25 PROBLEM — D22.4 MELANOCYTIC NEVI OF SCALP AND NECK: Status: ACTIVE | Noted: 2021-08-25

## 2021-08-25 PROCEDURE — 11102 TANGNTL BX SKIN SINGLE LES: CPT | Mod: 59

## 2021-08-25 PROCEDURE — 17110 DESTRUCTION B9 LES UP TO 14: CPT

## 2021-08-25 PROCEDURE — 99213 OFFICE O/P EST LOW 20 MIN: CPT | Mod: 25

## 2021-08-25 PROCEDURE — ? BIOPSY BY SHAVE METHOD

## 2021-08-25 PROCEDURE — ? LIQUID NITROGEN

## 2021-08-25 PROCEDURE — ? SUNSCREEN TREATMENT REGIMEN

## 2021-08-25 PROCEDURE — ? SUNSCREEN RECOMMENDATIONS

## 2021-08-25 PROCEDURE — 17000 DESTRUCT PREMALG LESION: CPT | Mod: 59

## 2021-08-25 PROCEDURE — ? COUNSELING

## 2021-08-25 ASSESSMENT — LOCATION ZONE DERM
LOCATION ZONE: LEG
LOCATION ZONE: TRUNK
LOCATION ZONE: ARM
LOCATION ZONE: SCALP
LOCATION ZONE: FACE

## 2021-08-25 ASSESSMENT — LOCATION DETAILED DESCRIPTION DERM
LOCATION DETAILED: SUBXIPHOID
LOCATION DETAILED: LEFT SUPERIOR LATERAL LOWER BACK
LOCATION DETAILED: MIDDLE STERNUM
LOCATION DETAILED: EPIGASTRIC SKIN
LOCATION DETAILED: LEFT LATERAL DISTAL CALF
LOCATION DETAILED: RIGHT CENTRAL FRONTAL SCALP
LOCATION DETAILED: LEFT PROXIMAL DORSAL FOREARM
LOCATION DETAILED: LEFT INFERIOR CENTRAL MALAR CHEEK
LOCATION DETAILED: RIGHT FOREHEAD
LOCATION DETAILED: LEFT PROXIMAL CALF

## 2021-08-25 ASSESSMENT — LOCATION SIMPLE DESCRIPTION DERM
LOCATION SIMPLE: LEFT CHEEK
LOCATION SIMPLE: LEFT CALF
LOCATION SIMPLE: CHEST
LOCATION SIMPLE: SCALP
LOCATION SIMPLE: ABDOMEN
LOCATION SIMPLE: LEFT LOWER BACK
LOCATION SIMPLE: RIGHT FOREHEAD
LOCATION SIMPLE: LEFT FOREARM
LOCATION SIMPLE: LEFT LOWER LEG

## 2021-08-25 NOTE — PROCEDURE: BIOPSY BY SHAVE METHOD

## 2021-08-25 NOTE — PROCEDURE: LIQUID NITROGEN
Render Post-Care Instructions In Note?: no
Number Of Freeze-Thaw Cycles: 3 freeze-thaw cycles
Medical Necessity Information: It is in your best interest to select a reason for this procedure from the list below. All of these items fulfill various CMS LCD requirements except the new and changing color options.
Show Topical Anesthesia Variable?: Yes
Medical Necessity Clause: This procedure was medically necessary because the lesions that were treated were:
Duration Of Freeze Thaw-Cycle (Seconds): 10-15
Post-Care Instructions: I reviewed with the patient in detail post-care instructions. Patient is to wear sunprotection, and avoid picking at any of the treated lesions. Pt may apply Vaseline to crusted or scabbing areas.
Detail Level: Detailed
Consent: The patient's consent was obtained including but not limited to risks of crusting, scabbing, blistering, scarring, darker or lighter pigmentary change, recurrence, incomplete removal and infection.
Duration Of Freeze Thaw-Cycle (Seconds): 5
Number Of Freeze-Thaw Cycles: 2 freeze-thaw cycles

## 2021-08-26 ENCOUNTER — SLEEP CENTER VISIT (OUTPATIENT)
Dept: SLEEP MEDICINE | Facility: MEDICAL CENTER | Age: 71
End: 2021-08-26
Payer: MEDICARE

## 2021-08-26 VITALS
OXYGEN SATURATION: 98 % | WEIGHT: 150 LBS | RESPIRATION RATE: 16 BRPM | BODY MASS INDEX: 25.61 KG/M2 | SYSTOLIC BLOOD PRESSURE: 108 MMHG | DIASTOLIC BLOOD PRESSURE: 66 MMHG | HEIGHT: 64 IN | TEMPERATURE: 98.4 F | HEART RATE: 84 BPM

## 2021-08-26 DIAGNOSIS — I27.20 PULMONARY HYPERTENSION (HCC): ICD-10-CM

## 2021-08-26 DIAGNOSIS — R05.9 COUGH: ICD-10-CM

## 2021-08-26 DIAGNOSIS — R06.01 ORTHOPNEA: ICD-10-CM

## 2021-08-26 DIAGNOSIS — J45.40 MODERATE PERSISTENT ASTHMA WITHOUT COMPLICATION: ICD-10-CM

## 2021-08-26 PROCEDURE — 99214 OFFICE O/P EST MOD 30 MIN: CPT | Performed by: INTERNAL MEDICINE

## 2021-08-26 RX ORDER — FLUORIDE TOOTHPASTE
TOOTHPASTE DENTAL
COMMUNITY

## 2021-08-26 RX ORDER — EPINASTINE HCL 0.05 %
DROPS OPHTHALMIC (EYE) PRN
COMMUNITY

## 2021-08-26 ASSESSMENT — ENCOUNTER SYMPTOMS
WEAKNESS: 0
BLURRED VISION: 0
FEVER: 0
DEPRESSION: 0
HEARTBURN: 0
HEMOPTYSIS: 0
VOMITING: 0
PALPITATIONS: 0
HEADACHES: 0
PND: 0
SPUTUM PRODUCTION: 1
COUGH: 1
BACK PAIN: 0
EYE REDNESS: 0
SORE THROAT: 0
EYE DISCHARGE: 0
FALLS: 0
SINUS PAIN: 0
CONSTIPATION: 0
TREMORS: 0
MYALGIAS: 0
NECK PAIN: 0
CHILLS: 0
DOUBLE VISION: 0
FOCAL WEAKNESS: 0
ABDOMINAL PAIN: 0
WEIGHT LOSS: 0
DIZZINESS: 0
ORTHOPNEA: 0
PHOTOPHOBIA: 0
WHEEZING: 0
DIAPHORESIS: 0
CLAUDICATION: 0
EYE PAIN: 0
STRIDOR: 0
SPEECH CHANGE: 0
NAUSEA: 0
DIARRHEA: 0
SHORTNESS OF BREATH: 0

## 2021-08-26 ASSESSMENT — FIBROSIS 4 INDEX: FIB4 SCORE: 1.09

## 2021-08-26 NOTE — PROGRESS NOTES
Chief Complaint   Patient presents with   • Asthma     last seen 5/5/21 Dr. Huang    • Results     CXR 8/11/21-Urgent care          HPI: This patient is a 71 y.o. female whom is followed in our clinic for asthma last seen by Dr. Huang on 5/5/21.  The patient has a long history of asthma dating back to childhood which was well controlled prior to moving to [place] roughly 9 years ago.  She also has a history of hiatal hernia.  She is a lifelong non-smoker.  She has been followed in our clinic for cough which began in June of last year.  Symptoms were initially just a cough that seemed to improve with treatment for reactive airways disease for which she is currently on Breo 200, Singulair, montelukast and short acting bronchodilators.  Over the last several months she has developed chest pressure particularly when lying flat in addition to worsening of cough and significant mucus production.  She was treated for gastroesophageal reflux with proton pump inhibitor in the morning and more recently added at night.  Per patient this did not seem to improve her symptoms and she continues to need to sleep almost upright to avoid chest pressure and cough.  Cough is not exacerbated by exercise and is not severe or frequent throughout the day.  I do not see where pulmonary function testing was ever ordered although she does have a chest x-ray from August 16 which shows clear lung fields.  No fevers, chills, night sweats, weight loss.  No purulent sputum.  No peripheral eosinophilia.    Past Medical History:   Diagnosis Date   • HLD (hyperlipidemia) 7/7/2015   • Mild intermittent asthma without complication 7/19/2016       Social History     Socioeconomic History   • Marital status:      Spouse name: Not on file   • Number of children: Not on file   • Years of education: Not on file   • Highest education level: Not on file   Occupational History   • Occupation: Education      Comment: Retired   Tobacco Use   • Smoking status:  Passive Smoke Exposure - Never Smoker   • Smokeless tobacco: Never Used   • Tobacco comment: 2nd hand exposure/parents smoked    Vaping Use   • Vaping Use: Never used   Substance and Sexual Activity   • Alcohol use: No     Alcohol/week: 0.0 oz   • Drug use: No   • Sexual activity: Not on file   Other Topics Concern   • Not on file   Social History Narrative   • Not on file     Social Determinants of Health     Financial Resource Strain:    • Difficulty of Paying Living Expenses:    Food Insecurity:    • Worried About Running Out of Food in the Last Year:    • Ran Out of Food in the Last Year:    Transportation Needs:    • Lack of Transportation (Medical):    • Lack of Transportation (Non-Medical):    Physical Activity:    • Days of Exercise per Week:    • Minutes of Exercise per Session:    Stress:    • Feeling of Stress :    Social Connections:    • Frequency of Communication with Friends and Family:    • Frequency of Social Gatherings with Friends and Family:    • Attends Mu-ism Services:    • Active Member of Clubs or Organizations:    • Attends Club or Organization Meetings:    • Marital Status:    Intimate Partner Violence:    • Fear of Current or Ex-Partner:    • Emotionally Abused:    • Physically Abused:    • Sexually Abused:        Family History   Problem Relation Age of Onset   • Heart Disease Mother    • Hyperlipidemia Mother    • Hypertension Mother    • Stroke Mother    • Other Father         Plane crash   • No Known Problems Sister    • Heart Attack Maternal Aunt    • Heart Attack Maternal Grandmother        Current Outpatient Medications on File Prior to Visit   Medication Sig Dispense Refill   • Epinastine HCl 0.05 % Solution Administer  into affected eye(s) as needed. PRN     • Mouthwashes (BIOTENE/CALCIUM PBF) Liquid Use  in the mouth or throat. And spray     • albuterol (PROAIR HFA) 108 (90 Base) MCG/ACT Aero Soln inhalation aerosol USE 2 INHALATIONS EVERY 6 HOURS AS NEEDED FOR SHORTNESS OF  BREATH 3 Each 3   • ipratropium-albuterol (DUONEB) 0.5-2.5 (3) MG/3ML nebulizer solution Take 3 mL by nebulization every 6 hours as needed for Shortness of Breath. 180 Each 4   • cyanocobalamin (VITAMIN B-12) 1000 MCG/ML Solution Inject 1,000 mcg into the shoulder, thigh, or buttocks every 30 days.     • fluticasone (FLONASE SENSIMIST) 27.5 MCG/SPRAY nasal spray Administer 2 Sprays into affected nostril(S) every day.     • BREO ELLIPTA 200-25 MCG/INH AEROSOL POWDER, BREATH ACTIVATED USE 1 INHALATION DAILY (RINSE MOUTH AFTER USE) 3 Each 3   • montelukast (SINGULAIR) 10 MG Tab TAKE 1 TABLET DAILY (Patient taking differently: Take 10 mg by mouth at bedtime. TAKE 1 TABLET DAILY) 90 Tab 3   • omeprazole (PRILOSEC) 40 MG delayed-release capsule Take 1 Cap by mouth every day. 90 Cap 3   • simvastatin (ZOCOR) 20 MG Tab TAKE 1 TABLET EVERY EVENING (Patient taking differently: Take 20 mg by mouth every evening. TAKE 1 TABLET EVERY EVENING) 90 Tab 3   • Estradiol-Norethindrone Acet 0.5-0.1 MG Tab TAKE 1 TABLET DAILY ([place] mfr only please) (Patient taking differently: Take 1 tablet by mouth every day. TAKE 1 TABLET DAILY ([place] mfr only please)) 90 Tab 3   • Lifitegrast (XIIDRA) 5 % Solution Administer 1 Drop into both eyes every day.     • Nasal Wash (ALKALOL) Solution Administer 1 Each into affected nostril(S) 2 times a day.     • Acetaminophen (TYLENOL EXTRA STRENGTH PO) Take  by mouth. (Patient not taking: Reported on 8/26/2021)       No current facility-administered medications on file prior to visit.       Tape      ROS:   Review of Systems   Constitutional: Negative for chills, diaphoresis, fever, malaise/fatigue and weight loss.   HENT: Negative for congestion, ear discharge, ear pain, hearing loss, nosebleeds, sinus pain, sore throat and tinnitus.    Eyes: Negative for blurred vision, double vision, photophobia, pain, discharge and redness.   Respiratory: Positive for cough and sputum production.  "Negative for hemoptysis, shortness of breath, wheezing and stridor.    Cardiovascular: Negative for chest pain, palpitations, orthopnea, claudication, leg swelling and PND.        Chest pressure     Gastrointestinal: Negative for abdominal pain, constipation, diarrhea, heartburn, nausea and vomiting.   Genitourinary: Negative for dysuria and urgency.   Musculoskeletal: Negative for back pain, falls, joint pain, myalgias and neck pain.   Skin: Negative for itching and rash.   Neurological: Negative for dizziness, tremors, speech change, focal weakness, weakness and headaches.   Endo/Heme/Allergies: Negative for environmental allergies.   Psychiatric/Behavioral: Negative for depression.       /66 (BP Location: Right arm, Patient Position: Sitting, BP Cuff Size: Adult)   Pulse 84   Temp 36.9 °C (98.4 °F) (Temporal)   Resp 16   Ht 1.626 m (5' 4\")   Wt 68 kg (150 lb)   SpO2 98%   Physical Exam  Vitals reviewed.   Constitutional:       General: She is not in acute distress.     Appearance: Normal appearance. She is well-developed and normal weight.   HENT:      Head: Normocephalic and atraumatic.      Right Ear: External ear normal.      Left Ear: External ear normal.      Nose: Nose normal. No congestion.      Mouth/Throat:      Mouth: Mucous membranes are moist.      Pharynx: Oropharynx is clear. No oropharyngeal exudate.   Eyes:      General: No scleral icterus.     Extraocular Movements: Extraocular movements intact.      Conjunctiva/sclera: Conjunctivae normal.      Pupils: Pupils are equal, round, and reactive to light.   Neck:      Vascular: No JVD.      Trachea: No tracheal deviation.   Cardiovascular:      Rate and Rhythm: Normal rate and regular rhythm.      Heart sounds: Normal heart sounds. No murmur heard.   No friction rub. No gallop.    Pulmonary:      Effort: Pulmonary effort is normal. No accessory muscle usage or respiratory distress.      Breath sounds: Normal breath sounds. No wheezing or " rales.   Abdominal:      General: There is no distension.      Palpations: Abdomen is soft.      Tenderness: There is no abdominal tenderness.   Musculoskeletal:         General: No tenderness or deformity. Normal range of motion.      Cervical back: Normal range of motion and neck supple.      Right lower leg: No edema.      Left lower leg: No edema.   Lymphadenopathy:      Cervical: No cervical adenopathy.   Skin:     General: Skin is warm and dry.      Findings: No rash.      Nails: There is no clubbing.   Neurological:      Mental Status: She is alert and oriented to person, place, and time.      Cranial Nerves: No cranial nerve deficit.      Gait: Gait normal.   Psychiatric:         Mood and Affect: Mood normal.         Behavior: Behavior normal.         PFTs as reviewed by me personally:none    Imagaing as reviewed by me personally:  none    Assessment:  1. Cough  CT-CHEST (THORAX) W/O   2. Moderate persistent asthma without complication     3. Pulmonary hypertension (HCC)  EC-ECHOCARDIOGRAM COMPLETE W/O CONT   4. Orthopnea  EC-ECHOCARDIOGRAM COMPLETE W/O CONT       Plan:  1.  This is chronic and symptoms are highly suggestive of reflux.  No hiatal hernia appreciated on chest x-ray but I did explain to the patient that she may have a sliding hiatal hernia.  Exam findings do not suggest bronchiectasis but this is a possibility as well.  I would like to order CT chest as well as pulmonary function testing.  2.  This is a chronic diagnosis I am not sure cough is related given positional nature and the fact that she can exercise without exacerbation.  For now we will continue Breo 200, montelukast and short acting bronchodilators.  3.  This is a finding noted on echocardiogram with RVSP of 40 mmHg in 2018.  Given her symptoms of orthopnea, I would like to repeat echocardiogram now.  4.  While she does not have exam findings suggestive of heart failure or paralyzed diaphragm, she did have elevated RVSP on prior  echo as per above.  In addition to CT, PFTs I would also like to order an echo as per above.  Return in about 5 months (around 1/26/2022) for PFTs, ct chest .

## 2021-08-27 ENCOUNTER — NON-PROVIDER VISIT (OUTPATIENT)
Dept: SLEEP MEDICINE | Facility: MEDICAL CENTER | Age: 71
End: 2021-08-27
Attending: INTERNAL MEDICINE
Payer: MEDICARE

## 2021-08-27 VITALS — HEIGHT: 65 IN | BODY MASS INDEX: 24.49 KG/M2 | WEIGHT: 147 LBS

## 2021-08-27 PROCEDURE — 94726 PLETHYSMOGRAPHY LUNG VOLUMES: CPT | Mod: 26 | Performed by: INTERNAL MEDICINE

## 2021-08-27 PROCEDURE — 94729 DIFFUSING CAPACITY: CPT | Mod: 26 | Performed by: INTERNAL MEDICINE

## 2021-08-27 PROCEDURE — 94060 EVALUATION OF WHEEZING: CPT | Mod: 26 | Performed by: INTERNAL MEDICINE

## 2021-08-27 ASSESSMENT — PULMONARY FUNCTION TESTS
FEV1_PREDICTED: 2.19
FVC: 3.18
FVC: 3.19
FEV1: 2.6
FEV1_LLN: 1.83
FVC_PERCENT_PREDICTED: 112
FEV1/FVC_PREDICTED: 78
FEV1/FVC_PERCENT_LLN: 65
FEV1_PERCENT_CHANGE: 0
FEV1/FVC: 82
FEV1/FVC_PERCENT_PREDICTED: 105
FVC_PREDICTED: 2.84
FEV1: 2.6
FEV1/FVC_PERCENT_PREDICTED: 105
FEV1_PERCENT_PREDICTED: 118
FEV1/FVC: 81.76
FEV1/FVC_PERCENT_CHANGE: 0
FEV1_PERCENT_PREDICTED: 118
FEV1/FVC_PERCENT_PREDICTED: 104
FEV1/FVC: 81
FEV1_PERCENT_CHANGE: 0
FEV1/FVC_PERCENT_PREDICTED: 77
FEV1/FVC_PERCENT_PREDICTED: 105
FVC_PERCENT_PREDICTED: 112
FEV1/FVC: 82
FVC_LLN: 2.37

## 2021-08-27 ASSESSMENT — FIBROSIS 4 INDEX: FIB4 SCORE: 1.09

## 2021-08-27 NOTE — PROCEDURES
Technician: Josefina Jeffries RRT, CPFT  Good patient effort & cooperation.  The results of this test meet the ATS/ERS standards for acceptability and repeatability.  Test was performed on the [employer] Body Plethysmograph-Elite DX system.  Predicted equations for Spirometry are GLI-2012, ITS for lung volumes, and GLI- 2017 for DLCO. Ventolin HFA administered- 3-4 puffs w/o spacer administered using pt's normal technique  The DLCO was uncorrected for Hgb.  DLCO was performed during dilation period.   Patient C/O being claustrophobic, but was able to perform the Pleth maneuver without incident.    Interpretation;   Baseline spirometry shows normal airflows.  No significant change following bronchodilator therapy.  Lung volumes are within normal limits.  Diffusion capacity is within normal limits.  Normal pulmonary function test with normal flow volume loop.

## 2021-08-28 ENCOUNTER — HOSPITAL ENCOUNTER (OUTPATIENT)
Dept: RADIOLOGY | Facility: MEDICAL CENTER | Age: 71
End: 2021-08-28
Attending: INTERNAL MEDICINE
Payer: MEDICARE

## 2021-08-28 DIAGNOSIS — R05.9 COUGH: ICD-10-CM

## 2021-08-28 PROCEDURE — 71250 CT THORAX DX C-: CPT | Mod: MG

## 2021-08-30 ENCOUNTER — HOSPITAL ENCOUNTER (OUTPATIENT)
Dept: CARDIOLOGY | Facility: MEDICAL CENTER | Age: 71
End: 2021-08-30
Attending: INTERNAL MEDICINE
Payer: MEDICARE

## 2021-08-30 DIAGNOSIS — I27.20 PULMONARY HYPERTENSION (HCC): ICD-10-CM

## 2021-08-30 DIAGNOSIS — R06.01 ORTHOPNEA: ICD-10-CM

## 2021-08-30 LAB
LV EJECT FRACT  99904: 65
LV EJECT FRACT MOD 2C 99903: 43.31
LV EJECT FRACT MOD 4C 99902: 39.2
LV EJECT FRACT MOD BP 99901: 44.25

## 2021-08-30 PROCEDURE — 93306 TTE W/DOPPLER COMPLETE: CPT

## 2021-08-30 PROCEDURE — 93306 TTE W/DOPPLER COMPLETE: CPT | Mod: 26 | Performed by: INTERNAL MEDICINE

## 2021-10-25 PROBLEM — M75.02 ADHESIVE CAPSULITIS OF LEFT SHOULDER: Status: ACTIVE | Noted: 2021-10-25

## 2021-11-12 ENCOUNTER — HOSPITAL ENCOUNTER (OUTPATIENT)
Facility: MEDICAL CENTER | Age: 71
End: 2021-11-12
Attending: ANESTHESIOLOGY
Payer: MEDICARE

## 2021-11-12 LAB — COVID ORDER STATUS COVID19: NORMAL

## 2021-11-12 PROCEDURE — U0003 INFECTIOUS AGENT DETECTION BY NUCLEIC ACID (DNA OR RNA); SEVERE ACUTE RESPIRATORY SYNDROME CORONAVIRUS 2 (SARS-COV-2) (CORONAVIRUS DISEASE [COVID-19]), AMPLIFIED PROBE TECHNIQUE, MAKING USE OF HIGH THROUGHPUT TECHNOLOGIES AS DESCRIBED BY CMS-2020-01-R: HCPCS

## 2021-11-12 PROCEDURE — U0005 INFEC AGEN DETEC AMPLI PROBE: HCPCS

## 2021-11-13 LAB
SARS-COV-2 RNA RESP QL NAA+PROBE: NOTDETECTED
SPECIMEN SOURCE: NORMAL

## 2022-01-26 ENCOUNTER — OFFICE VISIT (OUTPATIENT)
Dept: SLEEP MEDICINE | Facility: MEDICAL CENTER | Age: 72
End: 2022-01-26
Payer: MEDICARE

## 2022-01-26 ENCOUNTER — HOSPITAL ENCOUNTER (OUTPATIENT)
Dept: LAB | Facility: MEDICAL CENTER | Age: 72
End: 2022-01-26
Attending: INTERNAL MEDICINE
Payer: MEDICARE

## 2022-01-26 VITALS
HEIGHT: 64 IN | WEIGHT: 155 LBS | SYSTOLIC BLOOD PRESSURE: 124 MMHG | DIASTOLIC BLOOD PRESSURE: 66 MMHG | BODY MASS INDEX: 26.46 KG/M2 | TEMPERATURE: 97.2 F | HEART RATE: 72 BPM | OXYGEN SATURATION: 97 % | RESPIRATION RATE: 16 BRPM

## 2022-01-26 DIAGNOSIS — R68.2 DRY MOUTH: ICD-10-CM

## 2022-01-26 DIAGNOSIS — J45.40 MODERATE PERSISTENT ASTHMA WITHOUT COMPLICATION: ICD-10-CM

## 2022-01-26 DIAGNOSIS — R05.9 COUGH: ICD-10-CM

## 2022-01-26 PROCEDURE — 36415 COLL VENOUS BLD VENIPUNCTURE: CPT

## 2022-01-26 PROCEDURE — 86038 ANTINUCLEAR ANTIBODIES: CPT

## 2022-01-26 PROCEDURE — 86235 NUCLEAR ANTIGEN ANTIBODY: CPT | Mod: 91

## 2022-01-26 PROCEDURE — 99214 OFFICE O/P EST MOD 30 MIN: CPT | Performed by: INTERNAL MEDICINE

## 2022-01-26 ASSESSMENT — ENCOUNTER SYMPTOMS
WEIGHT LOSS: 0
FALLS: 0
SPEECH CHANGE: 0
DIAPHORESIS: 0
DEPRESSION: 0
PHOTOPHOBIA: 0
WHEEZING: 0
CLAUDICATION: 0
TREMORS: 0
SINUS PAIN: 0
DIARRHEA: 0
COUGH: 1
DOUBLE VISION: 0
HEMOPTYSIS: 0
NAUSEA: 0
NECK PAIN: 0
SPUTUM PRODUCTION: 1
BACK PAIN: 0
SORE THROAT: 0
BLURRED VISION: 0
EYE REDNESS: 0
STRIDOR: 0
VOMITING: 0
PALPITATIONS: 0
EYE PAIN: 0
ORTHOPNEA: 0
SHORTNESS OF BREATH: 0
CONSTIPATION: 0
DIZZINESS: 0
FEVER: 0
ABDOMINAL PAIN: 0
PND: 0
FOCAL WEAKNESS: 0
HEARTBURN: 0
MYALGIAS: 0
WEAKNESS: 0
CHILLS: 0
HEADACHES: 0
EYE DISCHARGE: 0

## 2022-01-26 ASSESSMENT — FIBROSIS 4 INDEX: FIB4 SCORE: 1.06

## 2022-01-26 NOTE — PROGRESS NOTES
Chief Complaint   Patient presents with   • Asthma     last seen 8/26/21    • Results     PFt 8/27/21, Ct Chest Thorax 8/28/21, echo 8/30/21          HPI: This patient is a 71 y.o. female whom is followed in our clinic for cough and RAD last seen by me on 8/26/21. The patient has a long history of asthma dating back to childhood.  She is a lifelong non-smoker.  She has been followed in our clinic for cough which began in June of 2020.  Symptoms were initially just a cough that seemed to improve with treatment for reactive airways disease for which she is currently on Breo 200, Singulair, montelukast and short acting bronchodilators. When I last saw her she was c/o chest pressure particularly when lying flat in addition to worsening of cough and significant mucus production.  She was treated for gastroesophageal reflux with proton pump inhibitor in the morning and at night w/o significant change. Cough was not worse with exercise or specific exposure. We ordered TTE as she had elevated RVSP of 40 mmHg in the past but updated TTE showed normal LV/RV fx and RVSP of 25 mmHg. PFTs from 8/27/21 were wnls. CT chest from 8/28/21 showed no airway thickening, no inflammation or infiltrates, no LAD. She has seen GI and underwent EGD which was reportedly normal but there was discussion of esophageal manometry. Pt states nocturnal sxs have improved but she has constant phlegm production described as sticky, white to yellow thick mucous conglomerations that she senses she needs to cough up throughout the day. If she forces herself to expel these, she is essentially symptom free at night. We discussed addition of LAMA in case this is related to airway inflammation however pt has chronic, fairly severe dry mouth for which she has been tested for sjogens disease on two occasions with negative serology in 2016 and 2018.     Past Medical History:   Diagnosis Date   • HLD (hyperlipidemia) 7/7/2015   • Mild intermittent asthma without  complication 7/19/2016       Social History     Socioeconomic History   • Marital status:      Spouse name: Not on file   • Number of children: Not on file   • Years of education: Not on file   • Highest education level: Not on file   Occupational History   • Occupation: Education      Comment: Retired   Tobacco Use   • Smoking status: Passive Smoke Exposure - Never Smoker   • Smokeless tobacco: Never Used   • Tobacco comment: 2nd hand exposure/parents smoked    Vaping Use   • Vaping Use: Never used   Substance and Sexual Activity   • Alcohol use: No     Alcohol/week: 0.0 oz   • Drug use: No   • Sexual activity: Not on file   Other Topics Concern   • Not on file   Social History Narrative   • Not on file     Social Determinants of Health     Financial Resource Strain:    • Difficulty of Paying Living Expenses: Not on file   Food Insecurity:    • Worried About Running Out of Food in the Last Year: Not on file   • Ran Out of Food in the Last Year: Not on file   Transportation Needs:    • Lack of Transportation (Medical): Not on file   • Lack of Transportation (Non-Medical): Not on file   Physical Activity:    • Days of Exercise per Week: Not on file   • Minutes of Exercise per Session: Not on file   Stress:    • Feeling of Stress : Not on file   Social Connections:    • Frequency of Communication with Friends and Family: Not on file   • Frequency of Social Gatherings with Friends and Family: Not on file   • Attends Jainism Services: Not on file   • Active Member of Clubs or Organizations: Not on file   • Attends Club or Organization Meetings: Not on file   • Marital Status: Not on file   Intimate Partner Violence:    • Fear of Current or Ex-Partner: Not on file   • Emotionally Abused: Not on file   • Physically Abused: Not on file   • Sexually Abused: Not on file   Housing Stability:    • Unable to Pay for Housing in the Last Year: Not on file   • Number of Places Lived in the Last Year: Not on file   •  Unstable Housing in the Last Year: Not on file       Family History   Problem Relation Age of Onset   • Heart Disease Mother    • Hyperlipidemia Mother    • Hypertension Mother    • Stroke Mother    • Other Father         Plane crash   • No Known Problems Sister    • Heart Attack Maternal Aunt    • Heart Attack Maternal Grandmother        Current Outpatient Medications on File Prior to Visit   Medication Sig Dispense Refill   • Probiotic Product (PROBIOTIC-10 PO) Take  by mouth.     • Xylitol (XYLIMELTS MT) Use  in the mouth or throat.     • Epinastine HCl 0.05 % Solution Administer  into affected eye(s) as needed. PRN     • Mouthwashes (BIOTENE/CALCIUM PBF) Liquid Use  in the mouth or throat. And spray     • albuterol (PROAIR HFA) 108 (90 Base) MCG/ACT Aero Soln inhalation aerosol USE 2 INHALATIONS EVERY 6 HOURS AS NEEDED FOR SHORTNESS OF BREATH 3 Each 3   • ipratropium-albuterol (DUONEB) 0.5-2.5 (3) MG/3ML nebulizer solution Take 3 mL by nebulization every 6 hours as needed for Shortness of Breath. 180 Each 4   • cyanocobalamin (VITAMIN B-12) 1000 MCG/ML Solution Inject 1,000 mcg into the shoulder, thigh, or buttocks every 30 days.     • fluticasone (FLONASE SENSIMIST) 27.5 MCG/SPRAY nasal spray Administer 2 Sprays into affected nostril(S) every day.     • BREO ELLIPTA 200-25 MCG/INH AEROSOL POWDER, BREATH ACTIVATED USE 1 INHALATION DAILY (RINSE MOUTH AFTER USE) 3 Each 3   • montelukast (SINGULAIR) 10 MG Tab TAKE 1 TABLET DAILY (Patient taking differently: Take 10 mg by mouth at bedtime. TAKE 1 TABLET DAILY) 90 Tab 3   • omeprazole (PRILOSEC) 40 MG delayed-release capsule Take 1 Cap by mouth every day. 90 Cap 3   • simvastatin (ZOCOR) 20 MG Tab TAKE 1 TABLET EVERY EVENING (Patient taking differently: Take 20 mg by mouth every evening. TAKE 1 TABLET EVERY EVENING) 90 Tab 3   • Estradiol-Norethindrone Acet 0.5-0.1 MG Tab TAKE 1 TABLET DAILY ([place] mfr only please) (Patient taking differently: Take 1 tablet by  "mouth every day. TAKE 1 TABLET DAILY ([place] mfr only please)) 90 Tab 3   • Lifitegrast (XIIDRA) 5 % Solution Administer 1 Drop into both eyes every day.     • Nasal Wash (ALKALOL) Solution Administer 1 Each into affected nostril(S) 2 times a day.       No current facility-administered medications on file prior to visit.       Tape      ROS:   Review of Systems   Constitutional: Negative for chills, diaphoresis, fever, malaise/fatigue and weight loss.   HENT: Negative for congestion, ear discharge, ear pain, hearing loss, nosebleeds, sinus pain, sore throat and tinnitus.    Eyes: Negative for blurred vision, double vision, photophobia, pain, discharge and redness.   Respiratory: Positive for cough and sputum production. Negative for hemoptysis, shortness of breath, wheezing and stridor.    Cardiovascular: Negative for chest pain, palpitations, orthopnea, claudication, leg swelling and PND.   Gastrointestinal: Negative for abdominal pain, constipation, diarrhea, heartburn, nausea and vomiting.   Genitourinary: Negative for dysuria and urgency.   Musculoskeletal: Negative for back pain, falls, joint pain, myalgias and neck pain.   Skin: Negative for itching and rash.   Neurological: Negative for dizziness, tremors, speech change, focal weakness, weakness and headaches.   Endo/Heme/Allergies: Negative for environmental allergies.   Psychiatric/Behavioral: Negative for depression.       /66 (BP Location: Right arm, Patient Position: Sitting, BP Cuff Size: Adult)   Pulse 72   Temp 36.2 °C (97.2 °F) (Temporal)   Resp 16   Ht 1.626 m (5' 4\")   Wt 70.3 kg (155 lb)   SpO2 97%   Physical Exam  Vitals reviewed.   Constitutional:       General: She is not in acute distress.     Appearance: Normal appearance. She is well-developed and normal weight.   HENT:      Head: Normocephalic and atraumatic.      Right Ear: External ear normal.      Left Ear: External ear normal.      Nose: Nose normal. No congestion.     "  Mouth/Throat:      Mouth: Mucous membranes are moist.      Pharynx: Oropharynx is clear. No oropharyngeal exudate.   Eyes:      General: No scleral icterus.     Extraocular Movements: Extraocular movements intact.      Conjunctiva/sclera: Conjunctivae normal.      Pupils: Pupils are equal, round, and reactive to light.   Neck:      Vascular: No JVD.      Trachea: No tracheal deviation.   Cardiovascular:      Rate and Rhythm: Normal rate and regular rhythm.      Heart sounds: Normal heart sounds. No murmur heard.  No friction rub. No gallop.    Pulmonary:      Effort: Pulmonary effort is normal. No accessory muscle usage or respiratory distress.      Breath sounds: Normal breath sounds. No wheezing or rales.   Abdominal:      General: There is no distension.      Palpations: Abdomen is soft.      Tenderness: There is no abdominal tenderness.   Musculoskeletal:         General: No tenderness or deformity. Normal range of motion.      Cervical back: Normal range of motion and neck supple.      Right lower leg: No edema.      Left lower leg: No edema.   Lymphadenopathy:      Cervical: No cervical adenopathy.   Skin:     General: Skin is warm and dry.      Findings: No rash.      Nails: There is no clubbing.   Neurological:      Mental Status: She is alert and oriented to person, place, and time.      Cranial Nerves: No cranial nerve deficit.      Gait: Gait normal.   Psychiatric:         Mood and Affect: Mood normal.         Behavior: Behavior normal.         PFTs as reviewed by me personally: as per HPI    Imaging as reviewed by me personally:  As per HPI    Assessment:  1. Dry mouth  AURORA IGG JOLLY W/RFLX TO AURORA IGG IFA    SSA 52 AND 60 (RO)(LEXII) AB, IGG   2. Moderate persistent asthma without complication     3. Cough         Plan:  1. This is chronic but I do feel it is worthwhile to repeat AURORA, SSA and SSB given increasing dry mouth. ? Whether dry mouth is contributing to her mucous production. We will not add  LAMA  2. I believe asthma is well controlled and no e/o bronchitis or airway inflammation on CT so am not sure her constant mucous production is related to asthma; see below. Continue Breo  3. I don't see any clear indication for ongoing airway inflammation or upper airway drainage. I suppose the cough/phlegm could be GERD related and did encourage her to proceed with esophageal manometry. She also has f/u with ENT  Return in about 4 months (around 5/26/2022) for labs.

## 2022-01-28 LAB
NUCLEAR IGG SER QL IA: NORMAL
SSA52 R0ENA AB IGG Q0420: 5 AU/ML (ref 0–40)
SSA60 R0ENA AB IGG Q0419: 1 AU/ML (ref 0–40)

## 2022-02-07 ENCOUNTER — PATIENT MESSAGE (OUTPATIENT)
Dept: SLEEP MEDICINE | Facility: MEDICAL CENTER | Age: 72
End: 2022-02-07

## 2022-02-17 ENCOUNTER — HOSPITAL ENCOUNTER (OUTPATIENT)
Dept: RADIOLOGY | Facility: MEDICAL CENTER | Age: 72
End: 2022-02-17
Attending: FAMILY MEDICINE
Payer: MEDICARE

## 2022-02-17 DIAGNOSIS — R92.8 ABNORMAL MAMMOGRAM: ICD-10-CM

## 2022-02-17 DIAGNOSIS — Z12.31 ENCOUNTER FOR SCREENING MAMMOGRAM FOR MALIGNANT NEOPLASM OF BREAST: ICD-10-CM

## 2022-02-17 DIAGNOSIS — R92.30 BREAST DENSITY: ICD-10-CM

## 2022-02-17 PROCEDURE — G0279 TOMOSYNTHESIS, MAMMO: HCPCS

## 2022-05-12 ENCOUNTER — OFFICE VISIT (OUTPATIENT)
Dept: SLEEP MEDICINE | Facility: MEDICAL CENTER | Age: 72
End: 2022-05-12
Payer: MEDICARE

## 2022-05-12 VITALS
DIASTOLIC BLOOD PRESSURE: 70 MMHG | HEART RATE: 64 BPM | WEIGHT: 157 LBS | RESPIRATION RATE: 16 BRPM | BODY MASS INDEX: 26.8 KG/M2 | SYSTOLIC BLOOD PRESSURE: 110 MMHG | HEIGHT: 64 IN | OXYGEN SATURATION: 95 %

## 2022-05-12 DIAGNOSIS — J45.20 MILD INTERMITTENT ASTHMA WITHOUT COMPLICATION: ICD-10-CM

## 2022-05-12 DIAGNOSIS — T78.40XS ALLERGY, SEQUELA: ICD-10-CM

## 2022-05-12 DIAGNOSIS — R09.82 PND (POST-NASAL DRIP): ICD-10-CM

## 2022-05-12 DIAGNOSIS — R05.9 COUGH: ICD-10-CM

## 2022-05-12 PROCEDURE — 99214 OFFICE O/P EST MOD 30 MIN: CPT | Performed by: INTERNAL MEDICINE

## 2022-05-12 RX ORDER — ALBUTEROL SULFATE 90 UG/1
AEROSOL, METERED RESPIRATORY (INHALATION)
Qty: 3 EACH | Refills: 3 | Status: SHIPPED | OUTPATIENT
Start: 2022-05-12

## 2022-05-12 RX ORDER — MONTELUKAST SODIUM 10 MG/1
10 TABLET ORAL DAILY
Qty: 90 TABLET | Refills: 3 | Status: SHIPPED | OUTPATIENT
Start: 2022-05-12

## 2022-05-12 ASSESSMENT — ENCOUNTER SYMPTOMS
EYE PAIN: 0
SHORTNESS OF BREATH: 0
NAUSEA: 0
DIARRHEA: 0
NECK PAIN: 0
DIZZINESS: 0
HEADACHES: 0
ABDOMINAL PAIN: 0
HEMOPTYSIS: 0
PALPITATIONS: 0
WEIGHT LOSS: 0
HEARTBURN: 0
CHILLS: 0
SPEECH CHANGE: 0
SINUS PAIN: 0
PHOTOPHOBIA: 0
STRIDOR: 0
BACK PAIN: 0
TREMORS: 0
WHEEZING: 0
DOUBLE VISION: 0
EYE REDNESS: 0
DIAPHORESIS: 0
ORTHOPNEA: 0
SORE THROAT: 0
PND: 0
FALLS: 0
DEPRESSION: 0
MYALGIAS: 0
CLAUDICATION: 0
BLURRED VISION: 0
WEAKNESS: 0
EYE DISCHARGE: 0
FEVER: 0
SPUTUM PRODUCTION: 1
VOMITING: 0
CONSTIPATION: 0
FOCAL WEAKNESS: 0
COUGH: 1

## 2022-05-12 ASSESSMENT — FIBROSIS 4 INDEX: FIB4 SCORE: 1.07

## 2022-05-12 NOTE — PROGRESS NOTES
Chief Complaint   Patient presents with   • Follow-Up     Last seen 1/26/22    • Results     Labs 1/26/22          HPI: This patient is a 72 y.o. female whom is followed in our clinic for asthma and chronic cough last seen by me on 1/26/22. The patient has a long history of asthma dating back to childhood.  She is a lifelong non-smoker.  She has been followed in our clinic for cough which began in June of 2020.  Symptoms were initially just a cough that seemed to improve with treatment for reactive airways disease for which she is currently on Breo 200, Singulair, montelukast and short acting bronchodilators.  When cough persisted she had a w/u with GI and is also followed by ENT. She is treated for GERD and recently manometry study showed esophageal dysmotility but GERD was well controlled. 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 PFTs from 8/27/21 were wnls. CT chest from 8/28/21 showed no airway thickening, no inflammation or infiltrates, no LAD. She c/o severe dry mouth and serologic testing of sjogrens syndrome was neg in 2016 and again after our last visit. After that we did a trial of Trelegy for 2 mos to see if LAMA would improve her cough but it did not so she resumed Breo 200. She feels asthma is well controlled but daily, deep cough often productive of clear mucous with no real triggering or alleviating factors persists.     Past Medical History:   Diagnosis Date   • HLD (hyperlipidemia) 7/7/2015   • Mild intermittent asthma without complication 7/19/2016       Social History     Socioeconomic History   • Marital status:      Spouse name: Not on file   • Number of children: Not on file   • Years of education: Not on file   • Highest education level: Not on file   Occupational History   • Occupation: Education      Comment: Retired   Tobacco Use   • Smoking status: Passive Smoke Exposure - Never Smoker   • Smokeless  tobacco: Never Used   • Tobacco comment: 2nd hand exposure/parents smoked    Vaping Use   • Vaping Use: Never used   Substance and Sexual Activity   • Alcohol use: No     Alcohol/week: 0.0 oz   • Drug use: No   • Sexual activity: Not on file   Other Topics Concern   • Not on file   Social History Narrative   • Not on file     Social Determinants of Health     Financial Resource Strain: Not on file   Food Insecurity: Not on file   Transportation Needs: Not on file   Physical Activity: Not on file   Stress: Not on file   Social Connections: Not on file   Intimate Partner Violence: Not on file   Housing Stability: Not on file       Family History   Problem Relation Age of Onset   • Heart Disease Mother    • Hyperlipidemia Mother    • Hypertension Mother    • Stroke Mother    • Other Father         Plane crash   • No Known Problems Sister    • Heart Attack Maternal Aunt    • Heart Attack Maternal Grandmother        Current Outpatient Medications on File Prior to Visit   Medication Sig Dispense Refill   • Probiotic Product (PROBIOTIC-10 PO) Take  by mouth.     • Xylitol (XYLIMELTS MT) Use  in the mouth or throat.     • Epinastine HCl 0.05 % Solution Administer  into affected eye(s) as needed. PRN     • Mouthwashes (BIOTENE/CALCIUM PBF) Liquid Use  in the mouth or throat. And spray     • ipratropium-albuterol (DUONEB) 0.5-2.5 (3) MG/3ML nebulizer solution Take 3 mL by nebulization every 6 hours as needed for Shortness of Breath. 180 Each 4   • cyanocobalamin (VITAMIN B-12) 1000 MCG/ML Solution Inject 1,000 mcg into the shoulder, thigh, or buttocks. Twice a month     • fluticasone (FLONASE SENSIMIST) 27.5 MCG/SPRAY nasal spray Administer 2 Sprays into affected nostril(S) every day.     • omeprazole (PRILOSEC) 40 MG delayed-release capsule Take 1 Cap by mouth every day. 90 Cap 3   • simvastatin (ZOCOR) 20 MG Tab TAKE 1 TABLET EVERY EVENING (Patient taking differently: Take 20 mg by mouth every evening. TAKE 1 TABLET EVERY  "EVENING) 90 Tab 3   • Estradiol-Norethindrone Acet 0.5-0.1 MG Tab TAKE 1 TABLET DAILY ([place] mfr only please) (Patient taking differently: Take 1 Tablet by mouth every day. TAKE 1 TABLET DAILY ([place] mfr only please)) 90 Tab 3     No current facility-administered medications on file prior to visit.       Patient has no known allergies.      ROS:   Review of Systems   Constitutional: Negative for chills, diaphoresis, fever, malaise/fatigue and weight loss.   HENT: Negative for congestion, ear discharge, ear pain, hearing loss, nosebleeds, sinus pain, sore throat and tinnitus.    Eyes: Negative for blurred vision, double vision, photophobia, pain, discharge and redness.   Respiratory: Positive for cough and sputum production. Negative for hemoptysis, shortness of breath, wheezing and stridor.    Cardiovascular: Negative for chest pain, palpitations, orthopnea, claudication, leg swelling and PND.   Gastrointestinal: Negative for abdominal pain, constipation, diarrhea, heartburn, nausea and vomiting.   Genitourinary: Negative for dysuria and urgency.   Musculoskeletal: Negative for back pain, falls, joint pain, myalgias and neck pain.   Skin: Negative for itching and rash.   Neurological: Negative for dizziness, tremors, speech change, focal weakness, weakness and headaches.   Endo/Heme/Allergies: Negative for environmental allergies.   Psychiatric/Behavioral: Negative for depression.       /70 (BP Location: Right arm, Patient Position: Sitting, BP Cuff Size: Adult)   Pulse 64   Resp 16   Ht 1.626 m (5' 4\")   Wt 71.2 kg (157 lb)   SpO2 95%   Physical Exam  Vitals reviewed.   Constitutional:       General: She is not in acute distress.     Appearance: Normal appearance. She is well-developed and normal weight.   HENT:      Head: Normocephalic and atraumatic.      Right Ear: External ear normal.      Left Ear: External ear normal.      Nose: Nose normal. No congestion.      Mouth/Throat:      " Mouth: Mucous membranes are moist.      Pharynx: Oropharynx is clear. No oropharyngeal exudate.   Eyes:      General: No scleral icterus.     Extraocular Movements: Extraocular movements intact.      Conjunctiva/sclera: Conjunctivae normal.      Pupils: Pupils are equal, round, and reactive to light.   Neck:      Vascular: No JVD.      Trachea: No tracheal deviation.   Cardiovascular:      Rate and Rhythm: Normal rate and regular rhythm.      Heart sounds: Normal heart sounds. No murmur heard.    No friction rub. No gallop.   Pulmonary:      Effort: Pulmonary effort is normal. No accessory muscle usage or respiratory distress.      Breath sounds: Normal breath sounds. No wheezing or rales.   Abdominal:      General: There is no distension.      Palpations: Abdomen is soft.      Tenderness: There is no abdominal tenderness.   Musculoskeletal:         General: No tenderness or deformity. Normal range of motion.      Cervical back: Normal range of motion and neck supple.      Right lower leg: No edema.      Left lower leg: No edema.   Lymphadenopathy:      Cervical: No cervical adenopathy.   Skin:     General: Skin is warm and dry.      Findings: No rash.      Nails: There is no clubbing.   Neurological:      Mental Status: She is alert and oriented to person, place, and time.      Cranial Nerves: No cranial nerve deficit.      Gait: Gait normal.   Psychiatric:         Mood and Affect: Mood normal.         Behavior: Behavior normal.         PFTs as reviewed by me personally: as per hPI    Imaging as reviewed by me personally:  As per hPI    Assessment:  1. Cough  Referral to Allergy   2. Allergy, sequela  Referral to Allergy   3. PND (post-nasal drip)     4. Mild intermittent asthma without complication  montelukast (SINGULAIR) 10 MG Tab    albuterol (PROAIR HFA) 108 (90 Base) MCG/ACT Aero Soln inhalation aerosol    Fluticasone Furoate-Vilanterol (BREO ELLIPTA) 200-25 MCG/INH AEROSOL POWDER, BREATH ACTIVATED        Plan:  1. Chronic and given her allergy hx; ? Allergies. She is not taking an antihistamine which I recommended she try and we will refer to allergy for evaluation. Continue therapy for RAD with ICS/LABA, montelukast and prn STACIE  2. See above.   3. Per pt well controlled with intranasal therapy; has seen ENT in the past  4. Chronic and sxs are controlled other than persistent cough as per above. Eval for allergic causes; continue therapy for RAD; gERD well controlled  Return in about 5 months (around 10/12/2022) for cough, asthma.

## 2022-08-15 ENCOUNTER — PATIENT MESSAGE (OUTPATIENT)
Dept: SLEEP MEDICINE | Facility: MEDICAL CENTER | Age: 72
End: 2022-08-15
Payer: MEDICARE

## 2022-08-15 RX ORDER — AZITHROMYCIN 250 MG/1
TABLET, FILM COATED ORAL
Qty: 6 TABLET | Refills: 2 | Status: SHIPPED | OUTPATIENT
Start: 2022-08-15 | End: 2023-10-11

## 2022-08-15 RX ORDER — METHYLPREDNISOLONE 4 MG/1
TABLET ORAL
Qty: 21 TABLET | Refills: 2 | Status: SHIPPED
Start: 2022-08-15 | End: 2023-04-11

## 2022-10-03 ENCOUNTER — APPOINTMENT (RX ONLY)
Dept: URBAN - METROPOLITAN AREA CLINIC 6 | Facility: CLINIC | Age: 72
Setting detail: DERMATOLOGY
End: 2022-10-03

## 2022-10-03 DIAGNOSIS — L81.4 OTHER MELANIN HYPERPIGMENTATION: ICD-10-CM

## 2022-10-03 DIAGNOSIS — L72.8 OTHER FOLLICULAR CYSTS OF THE SKIN AND SUBCUTANEOUS TISSUE: ICD-10-CM

## 2022-10-03 DIAGNOSIS — Z85.828 PERSONAL HISTORY OF OTHER MALIGNANT NEOPLASM OF SKIN: ICD-10-CM

## 2022-10-03 DIAGNOSIS — L82.0 INFLAMED SEBORRHEIC KERATOSIS: ICD-10-CM

## 2022-10-03 DIAGNOSIS — Z71.89 OTHER SPECIFIED COUNSELING: ICD-10-CM

## 2022-10-03 DIAGNOSIS — D22 MELANOCYTIC NEVI: ICD-10-CM

## 2022-10-03 DIAGNOSIS — L82.1 OTHER SEBORRHEIC KERATOSIS: ICD-10-CM

## 2022-10-03 DIAGNOSIS — D18.0 HEMANGIOMA: ICD-10-CM

## 2022-10-03 DIAGNOSIS — D69.2 OTHER NONTHROMBOCYTOPENIC PURPURA: ICD-10-CM

## 2022-10-03 PROBLEM — D18.01 HEMANGIOMA OF SKIN AND SUBCUTANEOUS TISSUE: Status: ACTIVE | Noted: 2022-10-03

## 2022-10-03 PROBLEM — D23.72 OTHER BENIGN NEOPLASM OF SKIN OF LEFT LOWER LIMB, INCLUDING HIP: Status: ACTIVE | Noted: 2022-10-03

## 2022-10-03 PROBLEM — D22.5 MELANOCYTIC NEVI OF TRUNK: Status: ACTIVE | Noted: 2022-10-03

## 2022-10-03 PROCEDURE — 99213 OFFICE O/P EST LOW 20 MIN: CPT

## 2022-10-03 PROCEDURE — ? SUNSCREEN TREATMENT REGIMEN

## 2022-10-03 PROCEDURE — ? ADDITIONAL NOTES

## 2022-10-03 PROCEDURE — ? COUNSELING

## 2022-10-03 PROCEDURE — ? BENIGN DESTRUCTION COSMETIC

## 2022-10-03 PROCEDURE — ? SUNSCREEN RECOMMENDATIONS

## 2022-10-03 ASSESSMENT — LOCATION SIMPLE DESCRIPTION DERM
LOCATION SIMPLE: RIGHT FOREARM
LOCATION SIMPLE: LEFT HAND
LOCATION SIMPLE: LEFT SHOULDER
LOCATION SIMPLE: LEFT FOREARM
LOCATION SIMPLE: RIGHT HAND
LOCATION SIMPLE: CHEST
LOCATION SIMPLE: RIGHT FOREHEAD
LOCATION SIMPLE: ABDOMEN
LOCATION SIMPLE: VULVA

## 2022-10-03 ASSESSMENT — LOCATION DETAILED DESCRIPTION DERM
LOCATION DETAILED: LEFT DORSAL MIDDLE METACARPOPHALANGEAL JOINT
LOCATION DETAILED: SUBXIPHOID
LOCATION DETAILED: RIGHT DISTAL DORSAL FOREARM
LOCATION DETAILED: MIDDLE STERNUM
LOCATION DETAILED: LEFT LABIA MAJORA
LOCATION DETAILED: LEFT POSTERIOR SHOULDER
LOCATION DETAILED: EPIGASTRIC SKIN
LOCATION DETAILED: LEFT PROXIMAL DORSAL FOREARM
LOCATION DETAILED: RIGHT FOREHEAD
LOCATION DETAILED: RIGHT RADIAL DORSAL HAND
LOCATION DETAILED: LEFT DISTAL DORSAL FOREARM

## 2022-10-03 ASSESSMENT — LOCATION ZONE DERM
LOCATION ZONE: HAND
LOCATION ZONE: ARM
LOCATION ZONE: TRUNK
LOCATION ZONE: VULVA
LOCATION ZONE: FACE

## 2022-10-03 NOTE — PROCEDURE: MIPS QUALITY
Quality 226: Preventive Care And Screening: Tobacco Use: Screening And Cessation Intervention: Patient screened for tobacco use and is an ex/non-smoker
Quality 130: Documentation Of Current Medications In The Medical Record: Current Medications Documented
Quality 111:Pneumonia Vaccination Status For Older Adults: Pneumococcal vaccine (PPSV23) administered on or after patient’s 60th birthday and before the end of the measurement period
Detail Level: Detailed
Quality 431: Preventive Care And Screening: Unhealthy Alcohol Use - Screening: Patient not identified as an unhealthy alcohol user when screened for unhealthy alcohol use using a systematic screening method

## 2022-10-11 ENCOUNTER — OFFICE VISIT (OUTPATIENT)
Dept: SLEEP MEDICINE | Facility: MEDICAL CENTER | Age: 72
End: 2022-10-11
Payer: MEDICARE

## 2022-10-11 VITALS
DIASTOLIC BLOOD PRESSURE: 60 MMHG | HEART RATE: 72 BPM | BODY MASS INDEX: 25.78 KG/M2 | WEIGHT: 151 LBS | RESPIRATION RATE: 16 BRPM | HEIGHT: 64 IN | SYSTOLIC BLOOD PRESSURE: 114 MMHG | OXYGEN SATURATION: 97 %

## 2022-10-11 DIAGNOSIS — K21.9 GASTROESOPHAGEAL REFLUX DISEASE, UNSPECIFIED WHETHER ESOPHAGITIS PRESENT: ICD-10-CM

## 2022-10-11 DIAGNOSIS — J45.20 MILD INTERMITTENT ASTHMA WITHOUT COMPLICATION: ICD-10-CM

## 2022-10-11 DIAGNOSIS — R05.3 CHRONIC COUGH: ICD-10-CM

## 2022-10-11 DIAGNOSIS — T78.40XS ALLERGY, SEQUELA: ICD-10-CM

## 2022-10-11 PROCEDURE — 99214 OFFICE O/P EST MOD 30 MIN: CPT | Performed by: INTERNAL MEDICINE

## 2022-10-11 ASSESSMENT — ENCOUNTER SYMPTOMS
VOMITING: 0
NAUSEA: 0
EYE REDNESS: 0
PND: 0
FEVER: 0
HEARTBURN: 0
ABDOMINAL PAIN: 0
EYE PAIN: 0
CLAUDICATION: 0
SHORTNESS OF BREATH: 0
SINUS PAIN: 0
COUGH: 0
DIZZINESS: 0
DOUBLE VISION: 0
CHILLS: 0
DIARRHEA: 0
EYE DISCHARGE: 0
TREMORS: 0
HEMOPTYSIS: 0
PHOTOPHOBIA: 0
FOCAL WEAKNESS: 0
FALLS: 0
WHEEZING: 0
DIAPHORESIS: 0
SORE THROAT: 0
CONSTIPATION: 0
STRIDOR: 0
MYALGIAS: 0
HEADACHES: 0
PALPITATIONS: 0
SPEECH CHANGE: 0
NECK PAIN: 0
BLURRED VISION: 0
DEPRESSION: 0
WEAKNESS: 0
BACK PAIN: 0
WEIGHT LOSS: 0
ORTHOPNEA: 0
SPUTUM PRODUCTION: 0

## 2022-10-11 ASSESSMENT — FIBROSIS 4 INDEX: FIB4 SCORE: 1.07

## 2022-10-11 NOTE — PROGRESS NOTES
Chief Complaint   Patient presents with    Asthma     Last seen 5/12/22          HPI: This patient is a 72 y.o. female whom is followed in our clinic for asthma last seen by me on 5/12/22. The patient has a long history of asthma dating back to childhood.  She is a lifelong non-smoker.  She has been followed in our clinic for cough which began in June of 2020.  Symptoms were initially just a cough that seemed to improve with treatment for reactive airways disease for which she was on Breo 200, montelukast and prn STACIE. We did a trial of trelegy which was not more beneficial than Breo.  When cough persisted she had a w/u with GI and is also followed by ENT. She is treated for GERD and recently manometry study showed esophageal dysmotility but GERD was well controlled. 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 PFTs from 8/27/21 were wnls. CT chest from 8/28/21 showed no airway thickening, no inflammation or infiltrates, no LAD. She c/o severe dry mouth and serologic testing of sjogrens syndrome was neg in 2016 and again after our last visit. We referred her for allergy testing after our last visit and she is waiting to have testing but in the interim she was given Breztri which she is using one puff BID with and her cough has nearly resolved with this. She has mild dysphonia which is new but overall feels significantly improved.     Past Medical History:   Diagnosis Date    HLD (hyperlipidemia) 7/7/2015    Mild intermittent asthma without complication 7/19/2016       Social History     Socioeconomic History    Marital status:      Spouse name: Not on file    Number of children: Not on file    Years of education: Not on file    Highest education level: Not on file   Occupational History    Occupation: Education      Comment: Retired   Tobacco Use    Smoking status: Never     Passive exposure: Past    Smokeless tobacco: Never     Tobacco comments:     2nd hand exposure/parents smoked    Vaping Use    Vaping Use: Never used   Substance and Sexual Activity    Alcohol use: No     Alcohol/week: 0.0 oz    Drug use: No    Sexual activity: Not on file   Other Topics Concern    Not on file   Social History Narrative    Not on file     Social Determinants of Health     Financial Resource Strain: Not on file   Food Insecurity: Not on file   Transportation Needs: Not on file   Physical Activity: Not on file   Stress: Not on file   Social Connections: Not on file   Intimate Partner Violence: Not on file   Housing Stability: Not on file       Family History   Problem Relation Age of Onset    Heart Disease Mother     Hyperlipidemia Mother     Hypertension Mother     Stroke Mother     Other Father         Plane crash    No Known Problems Sister     Heart Attack Maternal Aunt     Heart Attack Maternal Grandmother        Current Outpatient Medications on File Prior to Visit   Medication Sig Dispense Refill    azithromycin (ZITHROMAX) 250 MG Tab Take 2 tablets on day 1, then take 1 tablet a day for 4 days. 6 Tablet 2    methylPREDNISolone (MEDROL DOSEPAK) 4 MG Tablet Therapy Pack Take as directed. 21 Tablet 2    montelukast (SINGULAIR) 10 MG Tab Take 1 Tablet by mouth every day. 90 Tablet 3    albuterol (PROAIR HFA) 108 (90 Base) MCG/ACT Aero Soln inhalation aerosol USE 2 INHALATIONS EVERY 6 HOURS AS NEEDED FOR SHORTNESS OF BREATH 3 Each 3    Probiotic Product (PROBIOTIC-10 PO) Take  by mouth.      Epinastine HCl 0.05 % Solution Administer  into affected eye(s) as needed. PRN      Mouthwashes (BIOTENE/CALCIUM PBF) Liquid Use  in the mouth or throat. And spray      ipratropium-albuterol (DUONEB) 0.5-2.5 (3) MG/3ML nebulizer solution Take 3 mL by nebulization every 6 hours as needed for Shortness of Breath. 180 Each 4    cyanocobalamin (VITAMIN B-12) 1000 MCG/ML Solution Inject 1,000 mcg into the shoulder, thigh, or buttocks. Twice a month      fluticasone  "(FLONASE SENSIMIST) 27.5 MCG/SPRAY nasal spray Administer 2 Sprays into affected nostril(S) every day.      omeprazole (PRILOSEC) 40 MG delayed-release capsule Take 1 Cap by mouth every day. 90 Cap 3    simvastatin (ZOCOR) 20 MG Tab TAKE 1 TABLET EVERY EVENING (Patient taking differently: Take 20 mg by mouth every evening. TAKE 1 TABLET EVERY EVENING) 90 Tab 3    Estradiol-Norethindrone Acet 0.5-0.1 MG Tab TAKE 1 TABLET DAILY ([place] mfr only please) (Patient taking differently: Take 1 Tablet by mouth every day. TAKE 1 TABLET DAILY ([place] mfr only please)) 90 Tab 3    Xylitol (XYLIMELTS MT) Use  in the mouth or throat.       No current facility-administered medications on file prior to visit.       Patient has no known allergies.      ROS:   Review of Systems   Constitutional:  Negative for chills, diaphoresis, fever, malaise/fatigue and weight loss.   HENT:  Negative for congestion, ear discharge, ear pain, hearing loss, nosebleeds, sinus pain, sore throat and tinnitus.    Eyes:  Negative for blurred vision, double vision, photophobia, pain, discharge and redness.   Respiratory:  Negative for cough, hemoptysis, sputum production, shortness of breath, wheezing and stridor.    Cardiovascular:  Negative for chest pain, palpitations, orthopnea, claudication, leg swelling and PND.   Gastrointestinal:  Negative for abdominal pain, constipation, diarrhea, heartburn, nausea and vomiting.   Genitourinary:  Negative for dysuria and urgency.   Musculoskeletal:  Negative for back pain, falls, joint pain, myalgias and neck pain.   Skin:  Negative for itching and rash.   Neurological:  Negative for dizziness, tremors, speech change, focal weakness, weakness and headaches.   Endo/Heme/Allergies:  Negative for environmental allergies.   Psychiatric/Behavioral:  Negative for depression.      /60 (BP Location: Right arm, Patient Position: Sitting, BP Cuff Size: Adult)   Pulse 72   Resp 16   Ht 1.626 m (5' 4\")  "  Wt 68.5 kg (151 lb)   SpO2 97%   Physical Exam  Constitutional:       General: She is not in acute distress.     Appearance: Normal appearance. She is well-developed and normal weight.   HENT:      Head: Normocephalic and atraumatic.      Right Ear: External ear normal.      Left Ear: External ear normal.      Nose: Nose normal. No congestion.      Mouth/Throat:      Mouth: Mucous membranes are moist.      Pharynx: Oropharynx is clear. No oropharyngeal exudate.   Eyes:      General: No scleral icterus.     Extraocular Movements: Extraocular movements intact.      Conjunctiva/sclera: Conjunctivae normal.      Pupils: Pupils are equal, round, and reactive to light.   Neck:      Vascular: No JVD.      Trachea: No tracheal deviation.   Cardiovascular:      Rate and Rhythm: Normal rate and regular rhythm.      Heart sounds: Normal heart sounds. No murmur heard.    No friction rub. No gallop.   Pulmonary:      Effort: Pulmonary effort is normal. No accessory muscle usage or respiratory distress.      Breath sounds: Normal breath sounds. No wheezing or rales.   Abdominal:      General: There is no distension.      Palpations: Abdomen is soft.      Tenderness: There is no abdominal tenderness.   Musculoskeletal:         General: No tenderness or deformity. Normal range of motion.      Cervical back: Normal range of motion and neck supple.      Right lower leg: No edema.      Left lower leg: No edema.   Lymphadenopathy:      Cervical: No cervical adenopathy.   Skin:     General: Skin is warm and dry.      Findings: No rash.      Nails: There is no clubbing.   Neurological:      Mental Status: She is alert and oriented to person, place, and time.      Cranial Nerves: No cranial nerve deficit.      Gait: Gait normal.   Psychiatric:         Behavior: Behavior normal.       PFTs as reviewed by me personally: as per HPI    Imaging as reviewed by me personally:  as per HPI    Assessment:  1. Mild intermittent asthma without  complication  Spirometry      2. Chronic cough        3. Allergy, sequela        4. Gastroesophageal reflux disease, unspecified whether esophagitis present            Plan:  Chronic. Sxs are well controlled with improvement in cough on HFA. I wrote for breztri to be continued long term with spacer. F/u 6 mos with spirometry. Continue montelukast and prn STACIE  Presumed RAD vs. Allergy vs. GERD. Seems to be some side effect of DPI given improvement with inhaler change. She does have follow up with allergy  Long term but sxs improved with inhaler change which we will continue; appreciate allergy assistance.   Well controlled based on manometry  Return in about 6 months (around 4/11/2023) for spirometry at time of f/u.

## 2022-11-09 ENCOUNTER — PATIENT MESSAGE (OUTPATIENT)
Dept: HEALTH INFORMATION MANAGEMENT | Facility: OTHER | Age: 72
End: 2022-11-09

## 2023-01-13 ENCOUNTER — HOSPITAL ENCOUNTER (OUTPATIENT)
Dept: LAB | Facility: MEDICAL CENTER | Age: 73
End: 2023-01-13
Attending: FAMILY MEDICINE
Payer: MEDICARE

## 2023-01-13 LAB
ALBUMIN SERPL BCP-MCNC: 4.6 G/DL (ref 3.2–4.9)
ALBUMIN/GLOB SERPL: 1.9 G/DL
ALP SERPL-CCNC: 54 U/L (ref 30–99)
ALT SERPL-CCNC: 14 U/L (ref 2–50)
ANION GAP SERPL CALC-SCNC: 10 MMOL/L (ref 7–16)
AST SERPL-CCNC: 17 U/L (ref 12–45)
BASOPHILS # BLD AUTO: 1 % (ref 0–1.8)
BASOPHILS # BLD: 0.04 K/UL (ref 0–0.12)
BILIRUB SERPL-MCNC: 0.8 MG/DL (ref 0.1–1.5)
BUN SERPL-MCNC: 15 MG/DL (ref 8–22)
CALCIUM ALBUM COR SERPL-MCNC: 9 MG/DL (ref 8.5–10.5)
CALCIUM SERPL-MCNC: 9.5 MG/DL (ref 8.5–10.5)
CHLORIDE SERPL-SCNC: 105 MMOL/L (ref 96–112)
CHOLEST SERPL-MCNC: 182 MG/DL (ref 100–199)
CO2 SERPL-SCNC: 27 MMOL/L (ref 20–33)
CREAT SERPL-MCNC: 0.76 MG/DL (ref 0.5–1.4)
EOSINOPHIL # BLD AUTO: 0.08 K/UL (ref 0–0.51)
EOSINOPHIL NFR BLD: 2 % (ref 0–6.9)
ERYTHROCYTE [DISTWIDTH] IN BLOOD BY AUTOMATED COUNT: 39 FL (ref 35.9–50)
EST. AVERAGE GLUCOSE BLD GHB EST-MCNC: 91 MG/DL
FASTING STATUS PATIENT QL REPORTED: NORMAL
GFR SERPLBLD CREATININE-BSD FMLA CKD-EPI: 82 ML/MIN/1.73 M 2
GLOBULIN SER CALC-MCNC: 2.4 G/DL (ref 1.9–3.5)
GLUCOSE SERPL-MCNC: 92 MG/DL (ref 65–99)
HBA1C MFR BLD: 4.8 % (ref 4–5.6)
HCT VFR BLD AUTO: 35.7 % (ref 37–47)
HDLC SERPL-MCNC: 67 MG/DL
HGB BLD-MCNC: 11.2 G/DL (ref 12–16)
IMM GRANULOCYTES # BLD AUTO: 0.02 K/UL (ref 0–0.11)
IMM GRANULOCYTES NFR BLD AUTO: 0.5 % (ref 0–0.9)
LDLC SERPL CALC-MCNC: 102 MG/DL
LYMPHOCYTES # BLD AUTO: 1.53 K/UL (ref 1–4.8)
LYMPHOCYTES NFR BLD: 38 % (ref 22–41)
MCH RBC QN AUTO: 21 PG (ref 27–33)
MCHC RBC AUTO-ENTMCNC: 31.4 G/DL (ref 33.6–35)
MCV RBC AUTO: 66.9 FL (ref 81.4–97.8)
MONOCYTES # BLD AUTO: 0.27 K/UL (ref 0–0.85)
MONOCYTES NFR BLD AUTO: 6.7 % (ref 0–13.4)
NEUTROPHILS # BLD AUTO: 2.09 K/UL (ref 2–7.15)
NEUTROPHILS NFR BLD: 51.8 % (ref 44–72)
NRBC # BLD AUTO: 0 K/UL
NRBC BLD-RTO: 0 /100 WBC
PLATELET # BLD AUTO: 252 K/UL (ref 164–446)
PMV BLD AUTO: 11.5 FL (ref 9–12.9)
POTASSIUM SERPL-SCNC: 4.3 MMOL/L (ref 3.6–5.5)
PROT SERPL-MCNC: 7 G/DL (ref 6–8.2)
RBC # BLD AUTO: 5.34 M/UL (ref 4.2–5.4)
SODIUM SERPL-SCNC: 142 MMOL/L (ref 135–145)
T3 SERPL-MCNC: 104 NG/DL (ref 60–181)
T4 FREE SERPL-MCNC: 1.27 NG/DL (ref 0.93–1.7)
TRIGL SERPL-MCNC: 63 MG/DL (ref 0–149)
TSH SERPL DL<=0.005 MIU/L-ACNC: 3.94 UIU/ML (ref 0.38–5.33)
VIT B12 SERPL-MCNC: 830 PG/ML (ref 211–911)
WBC # BLD AUTO: 4 K/UL (ref 4.8–10.8)

## 2023-01-13 PROCEDURE — 85025 COMPLETE CBC W/AUTO DIFF WBC: CPT

## 2023-01-13 PROCEDURE — 80061 LIPID PANEL: CPT

## 2023-01-13 PROCEDURE — 84480 ASSAY TRIIODOTHYRONINE (T3): CPT

## 2023-01-13 PROCEDURE — 36415 COLL VENOUS BLD VENIPUNCTURE: CPT | Mod: GA

## 2023-01-13 PROCEDURE — 83036 HEMOGLOBIN GLYCOSYLATED A1C: CPT | Mod: GA

## 2023-01-13 PROCEDURE — 84439 ASSAY OF FREE THYROXINE: CPT

## 2023-01-13 PROCEDURE — 82607 VITAMIN B-12: CPT

## 2023-01-13 PROCEDURE — 84443 ASSAY THYROID STIM HORMONE: CPT

## 2023-01-13 PROCEDURE — 80053 COMPREHEN METABOLIC PANEL: CPT

## 2023-02-21 ENCOUNTER — HOSPITAL ENCOUNTER (OUTPATIENT)
Dept: RADIOLOGY | Facility: MEDICAL CENTER | Age: 73
End: 2023-02-21
Attending: FAMILY MEDICINE
Payer: MEDICARE

## 2023-02-21 DIAGNOSIS — Z12.31 VISIT FOR SCREENING MAMMOGRAM: ICD-10-CM

## 2023-02-27 ENCOUNTER — HOSPITAL ENCOUNTER (OUTPATIENT)
Dept: RADIOLOGY | Facility: MEDICAL CENTER | Age: 73
End: 2023-02-27
Payer: MEDICARE

## 2023-03-06 ENCOUNTER — HOSPITAL ENCOUNTER (OUTPATIENT)
Dept: RADIOLOGY | Facility: MEDICAL CENTER | Age: 73
End: 2023-03-06
Attending: FAMILY MEDICINE
Payer: MEDICARE

## 2023-03-06 DIAGNOSIS — N64.4 BREAST PAIN: ICD-10-CM

## 2023-03-06 PROCEDURE — 76642 ULTRASOUND BREAST LIMITED: CPT | Mod: LT

## 2023-03-06 PROCEDURE — G0279 TOMOSYNTHESIS, MAMMO: HCPCS

## 2023-04-11 ENCOUNTER — NON-PROVIDER VISIT (OUTPATIENT)
Dept: SLEEP MEDICINE | Facility: MEDICAL CENTER | Age: 73
End: 2023-04-11
Attending: INTERNAL MEDICINE
Payer: MEDICARE

## 2023-04-11 VITALS
BODY MASS INDEX: 26.46 KG/M2 | HEART RATE: 80 BPM | DIASTOLIC BLOOD PRESSURE: 62 MMHG | OXYGEN SATURATION: 97 % | HEIGHT: 64 IN | WEIGHT: 155 LBS | SYSTOLIC BLOOD PRESSURE: 134 MMHG

## 2023-04-11 DIAGNOSIS — K21.9 GASTROESOPHAGEAL REFLUX DISEASE, UNSPECIFIED WHETHER ESOPHAGITIS PRESENT: ICD-10-CM

## 2023-04-11 DIAGNOSIS — J45.20 MILD INTERMITTENT ASTHMA WITHOUT COMPLICATION: ICD-10-CM

## 2023-04-11 DIAGNOSIS — T78.40XS ALLERGY, SEQUELA: ICD-10-CM

## 2023-04-11 DIAGNOSIS — R05.3 CHRONIC COUGH: ICD-10-CM

## 2023-04-11 PROCEDURE — 94060 EVALUATION OF WHEEZING: CPT | Mod: 26 | Performed by: INTERNAL MEDICINE

## 2023-04-11 PROCEDURE — 99214 OFFICE O/P EST MOD 30 MIN: CPT | Performed by: INTERNAL MEDICINE

## 2023-04-11 PROCEDURE — 99212 OFFICE O/P EST SF 10 MIN: CPT | Performed by: INTERNAL MEDICINE

## 2023-04-11 ASSESSMENT — ENCOUNTER SYMPTOMS
DEPRESSION: 0
NAUSEA: 0
FALLS: 0
WEAKNESS: 0
CLAUDICATION: 0
CHILLS: 0
DIARRHEA: 0
HEADACHES: 0
NECK PAIN: 0
ORTHOPNEA: 0
FEVER: 0
PALPITATIONS: 0
COUGH: 0
EYE PAIN: 0
SPEECH CHANGE: 0
HEMOPTYSIS: 0
FOCAL WEAKNESS: 0
CONSTIPATION: 0
SINUS PAIN: 0
BLURRED VISION: 0
TREMORS: 0
WHEEZING: 0
SPUTUM PRODUCTION: 0
BACK PAIN: 0
PND: 0
WEIGHT LOSS: 0
PHOTOPHOBIA: 0
DIZZINESS: 0
DOUBLE VISION: 0
EYE DISCHARGE: 0
DIAPHORESIS: 0
EYE REDNESS: 0
SHORTNESS OF BREATH: 0
STRIDOR: 0
SORE THROAT: 0
VOMITING: 0
HEARTBURN: 0
ABDOMINAL PAIN: 0
MYALGIAS: 0

## 2023-04-11 ASSESSMENT — PULMONARY FUNCTION TESTS
FEV1_PREDICTED: 121
FVC_PERCENT_PREDICTED: 114
FEV1_PERCENT_CHANGE: 0
FVC: 3.1
FVC_PREDICTED: 2.72
FEV1_PERCENT_PREDICTED: 128
FEV1/FVC: 87.1
FEV1: 2.7
FEV1/FVC_PERCENT_PREDICTED: 106
FEV1/FVC: 82
FEV1/FVC_PERCENT_PREDICTED: 112
FEV1/FVC_PREDICTED: 76.84
FVC: 3.1
FEV1: 2.55
FEV1_PERCENT_CHANGE: 5
FEV1_PREDICTED: 2.09
FVC_PERCENT_PREDICTED: 114

## 2023-04-11 ASSESSMENT — FIBROSIS 4 INDEX: FIB4 SCORE: 1.33

## 2023-04-11 NOTE — PROGRESS NOTES
No chief complaint on file.        HPI: This patient is a 73 y.o. female whom is followed in our clinic for cough variant asthma last seen by me on 10/11/22.  The patient has a long history of asthma dating back to childhood.  She is a lifelong non-smoker.  She has been followed in our clinic for cough which began in June of 2020.  Symptoms were initially just a cough that seemed to improve with treatment for reactive airways disease for which she was on Breo 200, montelukast and prn STACIE. We did a trial of trelegy which was not more beneficial than Breo.  When cough persisted she had a w/u with GI and is also followed by ENT. She is treated for GERD and recently manometry study showed esophageal dysmotility but GERD was well controlled. 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 PFTs from 8/27/21 were wnls. CT chest from 8/28/21 showed no airway thickening, no inflammation or infiltrates, no LAD. She c/o severe dry mouth and serologic testing of sjogrens syndrome was neg in 2016 and again after our last visit. We referred her for allergy testing and she saw Dr Faustin who tried her on breztri. She had allergies confirmed but her cough has remained well controlled on breztri, one puff BID.    Past Medical History:   Diagnosis Date    HLD (hyperlipidemia) 7/7/2015    Mild intermittent asthma without complication 7/19/2016       Social History     Socioeconomic History    Marital status:      Spouse name: Not on file    Number of children: Not on file    Years of education: Not on file    Highest education level: Not on file   Occupational History    Occupation: Education      Comment: Retired   Tobacco Use    Smoking status: Never     Passive exposure: Past    Smokeless tobacco: Never    Tobacco comments:     2nd hand exposure/parents smoked    Vaping Use    Vaping Use: Never used   Substance and Sexual Activity    Alcohol use: No      Alcohol/week: 0.0 oz    Drug use: No    Sexual activity: Not on file   Other Topics Concern    Not on file   Social History Narrative    Not on file     Social Determinants of Health     Financial Resource Strain: Not on file   Food Insecurity: Not on file   Transportation Needs: Not on file   Physical Activity: Not on file   Stress: Not on file   Social Connections: Not on file   Intimate Partner Violence: Not on file   Housing Stability: Not on file       Family History   Problem Relation Age of Onset    Heart Disease Mother     Hyperlipidemia Mother     Hypertension Mother     Stroke Mother     Other Father         Plane crash    No Known Problems Sister     Heart Attack Maternal Aunt     Heart Attack Maternal Grandmother        Current Outpatient Medications on File Prior to Visit   Medication Sig Dispense Refill    Budeson-Glycopyrrol-Formoterol (BREZTRI AEROSPHERE) 160-9-4.8 MCG/ACT Aerosol Inhale 2 Puffs 2 times a day. 10.9 g 0    Budeson-Glycopyrrol-Formoterol (BREZTRI AEROSPHERE) 160-9-4.8 MCG/ACT Aerosol Inhale 2 Puffs 2 times a day. 32.1 g 3    montelukast (SINGULAIR) 10 MG Tab Take 1 Tablet by mouth every day. 90 Tablet 3    albuterol (PROAIR HFA) 108 (90 Base) MCG/ACT Aero Soln inhalation aerosol USE 2 INHALATIONS EVERY 6 HOURS AS NEEDED FOR SHORTNESS OF BREATH 3 Each 3    Probiotic Product (PROBIOTIC-10 PO) Take  by mouth.      Xylitol (XYLIMELTS MT) Use  in the mouth or throat.      Epinastine HCl 0.05 % Solution Administer  into affected eye(s) as needed. PRN      Mouthwashes (BIOTENE/CALCIUM PBF) Liquid Use  in the mouth or throat. And spray      ipratropium-albuterol (DUONEB) 0.5-2.5 (3) MG/3ML nebulizer solution Take 3 mL by nebulization every 6 hours as needed for Shortness of Breath. 180 Each 4    cyanocobalamin (VITAMIN B-12) 1000 MCG/ML Solution Inject 1,000 mcg into the shoulder, thigh, or buttocks. Twice a month      fluticasone (FLONASE SENSIMIST) 27.5 MCG/SPRAY nasal spray Administer 2  Sprays into affected nostril(S) every day.      omeprazole (PRILOSEC) 40 MG delayed-release capsule Take 1 Cap by mouth every day. 90 Cap 3    simvastatin (ZOCOR) 20 MG Tab TAKE 1 TABLET EVERY EVENING (Patient taking differently: Take 20 mg by mouth every evening. TAKE 1 TABLET EVERY EVENING) 90 Tab 3    Estradiol-Norethindrone Acet 0.5-0.1 MG Tab TAKE 1 TABLET DAILY (Saira mfr only please) (Patient taking differently: Take 1 Tablet by mouth every day. TAKE 1 TABLET DAILY (Saira mfr only please)) 90 Tab 3    azithromycin (ZITHROMAX) 250 MG Tab Take 2 tablets on day 1, then take 1 tablet a day for 4 days. 6 Tablet 2    methylPREDNISolone (MEDROL DOSEPAK) 4 MG Tablet Therapy Pack Take as directed. 21 Tablet 2     No current facility-administered medications on file prior to visit.       Patient has no known allergies.      ROS:   Review of Systems   Constitutional:  Negative for chills, diaphoresis, fever, malaise/fatigue and weight loss.   HENT:  Negative for congestion, ear discharge, ear pain, hearing loss, nosebleeds, sinus pain, sore throat and tinnitus.    Eyes:  Negative for blurred vision, double vision, photophobia, pain, discharge and redness.   Respiratory:  Negative for cough, hemoptysis, sputum production, shortness of breath, wheezing and stridor.    Cardiovascular:  Negative for chest pain, palpitations, orthopnea, claudication, leg swelling and PND.   Gastrointestinal:  Negative for abdominal pain, constipation, diarrhea, heartburn, nausea and vomiting.   Genitourinary:  Negative for dysuria and urgency.   Musculoskeletal:  Negative for back pain, falls, joint pain, myalgias and neck pain.   Skin:  Negative for itching and rash.   Neurological:  Negative for dizziness, tremors, speech change, focal weakness, weakness and headaches.   Endo/Heme/Allergies:  Negative for environmental allergies.   Psychiatric/Behavioral:  Negative for depression.      /62 (BP Location: Right arm,  "Patient Position: Sitting, BP Cuff Size: Adult)   Pulse 80   Ht 1.626 m (5' 4\")   Wt 70.3 kg (155 lb)   SpO2 97%   Physical Exam  Constitutional:       General: She is not in acute distress.     Appearance: Normal appearance. She is well-developed and normal weight.   HENT:      Head: Normocephalic and atraumatic.      Right Ear: External ear normal.      Left Ear: External ear normal.      Nose: Nose normal. No congestion.      Mouth/Throat:      Mouth: Mucous membranes are moist.      Pharynx: Oropharynx is clear. No oropharyngeal exudate.   Eyes:      General: No scleral icterus.     Extraocular Movements: Extraocular movements intact.      Conjunctiva/sclera: Conjunctivae normal.      Pupils: Pupils are equal, round, and reactive to light.   Neck:      Vascular: No JVD.      Trachea: No tracheal deviation.   Cardiovascular:      Rate and Rhythm: Normal rate and regular rhythm.      Heart sounds: Normal heart sounds. No murmur heard.    No friction rub. No gallop.   Pulmonary:      Effort: Pulmonary effort is normal. No accessory muscle usage or respiratory distress.      Breath sounds: Normal breath sounds. No wheezing or rales.   Abdominal:      General: There is no distension.      Palpations: Abdomen is soft.      Tenderness: There is no abdominal tenderness.   Musculoskeletal:         General: No tenderness or deformity. Normal range of motion.      Cervical back: Normal range of motion and neck supple.      Right lower leg: No edema.      Left lower leg: No edema.   Lymphadenopathy:      Cervical: No cervical adenopathy.   Skin:     General: Skin is warm and dry.      Findings: No rash.      Nails: There is no clubbing.   Neurological:      Mental Status: She is alert and oriented to person, place, and time.      Cranial Nerves: No cranial nerve deficit.      Gait: Gait normal.   Psychiatric:         Behavior: Behavior normal.       PFTs as reviewed by me personally: as per hPI    Imaging as reviewed by " me personally:  as per hPI    Assessment:  1. Mild intermittent asthma without complication        2. Chronic cough        3. Gastroesophageal reflux disease, unspecified whether esophagitis present        4. Allergy, sequela            Plan:  Cough variant, mild and controlled on breztri 1 puff BID. We discussed tapering to ICS/LABA only however pt would like to continue current tx for now given good control of sxs and upcoming travel plans. We will reassess in early fall and consider tapering therapy at that time  See above.   Well controlled  Sxs controlled therefore no indication for IT; appreciate allergy input  Return in about 6 months (around 10/11/2023).

## 2023-04-11 NOTE — PROCEDURES
Good patient effort & cooperation. The results of this test meet the ATS standards for acceptability and repeatability. Test was performed on the [email or web address]/D spirometry system. Predicted values were GLI-2012. A bronchodilator of Ventolin HFA - 2 puffs with a spacer was administered to the patient.    Interpretation;   Baseline spirometry shows normal airflows.  There is trend toward bronchodilator spots but does not meet ATS criteria.  Normal spirometry with normal flow volume loop.

## 2023-07-20 ENCOUNTER — PATIENT MESSAGE (OUTPATIENT)
Dept: SLEEP MEDICINE | Facility: MEDICAL CENTER | Age: 73
End: 2023-07-20
Payer: MEDICARE

## 2023-07-20 DIAGNOSIS — R05.3 CHRONIC COUGH: ICD-10-CM

## 2023-07-20 DIAGNOSIS — J45.20 MILD INTERMITTENT ASTHMA WITHOUT COMPLICATION: ICD-10-CM

## 2023-08-07 ENCOUNTER — HOSPITAL ENCOUNTER (OUTPATIENT)
Dept: LAB | Facility: MEDICAL CENTER | Age: 73
End: 2023-08-07
Attending: OPTOMETRIST
Payer: MEDICARE

## 2023-08-07 LAB — RHEUMATOID FACT SER IA-ACNC: 10 IU/ML (ref 0–14)

## 2023-08-07 PROCEDURE — 86235 NUCLEAR ANTIGEN ANTIBODY: CPT | Mod: 91

## 2023-08-07 PROCEDURE — 86038 ANTINUCLEAR ANTIBODIES: CPT

## 2023-08-07 PROCEDURE — 36415 COLL VENOUS BLD VENIPUNCTURE: CPT

## 2023-08-07 PROCEDURE — 86431 RHEUMATOID FACTOR QUANT: CPT

## 2023-08-09 LAB
ENA SS-B IGG SER IA-ACNC: 1 AU/ML (ref 0–40)
NUCLEAR IGG SER QL IA: NORMAL
SSA52 R0ENA AB IGG Q0420: 8 AU/ML (ref 0–40)
SSA60 R0ENA AB IGG Q0419: 2 AU/ML (ref 0–40)

## 2023-10-11 ENCOUNTER — OFFICE VISIT (OUTPATIENT)
Dept: SLEEP MEDICINE | Facility: MEDICAL CENTER | Age: 73
End: 2023-10-11
Attending: INTERNAL MEDICINE
Payer: MEDICARE

## 2023-10-11 VITALS
WEIGHT: 159 LBS | SYSTOLIC BLOOD PRESSURE: 120 MMHG | BODY MASS INDEX: 27.14 KG/M2 | DIASTOLIC BLOOD PRESSURE: 64 MMHG | HEART RATE: 74 BPM | OXYGEN SATURATION: 97 % | HEIGHT: 64 IN

## 2023-10-11 DIAGNOSIS — R05.3 CHRONIC COUGH: ICD-10-CM

## 2023-10-11 DIAGNOSIS — J45.20 MILD INTERMITTENT ASTHMA WITHOUT COMPLICATION: ICD-10-CM

## 2023-10-11 PROCEDURE — 3078F DIAST BP <80 MM HG: CPT | Performed by: INTERNAL MEDICINE

## 2023-10-11 PROCEDURE — 99212 OFFICE O/P EST SF 10 MIN: CPT | Performed by: INTERNAL MEDICINE

## 2023-10-11 PROCEDURE — 3074F SYST BP LT 130 MM HG: CPT | Performed by: INTERNAL MEDICINE

## 2023-10-11 PROCEDURE — 99214 OFFICE O/P EST MOD 30 MIN: CPT | Performed by: INTERNAL MEDICINE

## 2023-10-11 ASSESSMENT — ENCOUNTER SYMPTOMS
VOMITING: 0
NAUSEA: 0
DIAPHORESIS: 0
SORE THROAT: 0
WHEEZING: 0
NECK PAIN: 0
EYE PAIN: 0
WEIGHT LOSS: 0
STRIDOR: 0
SINUS PAIN: 0
PHOTOPHOBIA: 0
BACK PAIN: 0
CHILLS: 0
FEVER: 0
DIZZINESS: 0
TREMORS: 0
HEADACHES: 0
PALPITATIONS: 0
SHORTNESS OF BREATH: 0
MYALGIAS: 0
HEMOPTYSIS: 0
PND: 0
DOUBLE VISION: 0
EYE REDNESS: 0
ORTHOPNEA: 0
DIARRHEA: 0
CONSTIPATION: 0
FOCAL WEAKNESS: 0
COUGH: 0
ABDOMINAL PAIN: 0
SPEECH CHANGE: 0
DEPRESSION: 0
CLAUDICATION: 0
FALLS: 0
HEARTBURN: 0
EYE DISCHARGE: 0
SPUTUM PRODUCTION: 0
BLURRED VISION: 0
WEAKNESS: 0

## 2023-10-11 ASSESSMENT — FIBROSIS 4 INDEX: FIB4 SCORE: 1.33

## 2023-10-11 ASSESSMENT — PATIENT HEALTH QUESTIONNAIRE - PHQ9: CLINICAL INTERPRETATION OF PHQ2 SCORE: 0

## 2023-10-11 NOTE — PROGRESS NOTES
Chief Complaint   Patient presents with    Follow-Up     LAST SEEN 4/11/23         HPI: This patient is a 73 y.o. female whom is followed in our clinic for asthma, cough variant last seen by me on 4/11/23.  The patient has a long history of asthma dating back to childhood.  She is a lifelong non-smoker.  She has been followed in our clinic for cough which began in June of 2020.  Symptoms were initially just a cough that seemed to improve with treatment for reactive airways disease for which she was on Breo 200, montelukast and prn STACIE. We did a trial of trelegy which was not more beneficial than Breo.  When cough persisted she had a w/u with GI and is also followed by ENT. She is treated for GERD and manometry study showed esophageal dysmotility but GERD was well controlled. 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 PFTs from 8/27/21 were wnls. CT chest from 8/28/21 showed no airway thickening, no inflammation or infiltrates, no LAD. She c/o severe dry mouth and serologic testing of sjogrens syndrome was neg in 2016 and again last fall. We referred her for allergy testing and she saw Dr Faustin who tried her on breztri. She had allergies confirmed but her cough has remained well controlled on breztri, one puff BID with no exacerbations or need for specific IT. Spirometry from our last visit in April was normal. No acute complaints.     Past Medical History:   Diagnosis Date    HLD (hyperlipidemia) 7/7/2015    Mild intermittent asthma without complication 7/19/2016       Social History     Socioeconomic History    Marital status:      Spouse name: Not on file    Number of children: Not on file    Years of education: Not on file    Highest education level: Not on file   Occupational History    Occupation: Education      Comment: Retired   Tobacco Use    Smoking status: Never     Passive exposure: Past    Smokeless tobacco: Never     Tobacco comments:     2nd hand exposure/parents smoked    Vaping Use    Vaping Use: Never used   Substance and Sexual Activity    Alcohol use: No     Alcohol/week: 0.0 oz    Drug use: No    Sexual activity: Not on file   Other Topics Concern    Not on file   Social History Narrative    Not on file     Social Determinants of Health     Financial Resource Strain: Not on file   Food Insecurity: Not on file   Transportation Needs: Not on file   Physical Activity: Not on file   Stress: Not on file   Social Connections: Not on file   Intimate Partner Violence: Not on file   Housing Stability: Not on file       Family History   Problem Relation Age of Onset    Heart Disease Mother     Hyperlipidemia Mother     Hypertension Mother     Stroke Mother     Other Father         Plane crash    No Known Problems Sister     Heart Attack Maternal Aunt     Heart Attack Maternal Grandmother        Current Outpatient Medications on File Prior to Visit   Medication Sig Dispense Refill    Budeson-Glycopyrrol-Formoterol (BREZTRI AEROSPHERE) 160-9-4.8 MCG/ACT Aerosol Inhale 2 Puffs 2 times a day. 10.7 g 0    montelukast (SINGULAIR) 10 MG Tab Take 1 Tablet by mouth every day. 90 Tablet 3    albuterol (PROAIR HFA) 108 (90 Base) MCG/ACT Aero Soln inhalation aerosol USE 2 INHALATIONS EVERY 6 HOURS AS NEEDED FOR SHORTNESS OF BREATH 3 Each 3    Probiotic Product (PROBIOTIC-10 PO) Take  by mouth.      Xylitol (XYLIMELTS MT) Use  in the mouth or throat.      Epinastine HCl 0.05 % Solution Administer  into affected eye(s) as needed. PRN      Mouthwashes (BIOTENE/CALCIUM PBF) Liquid Use  in the mouth or throat. And spray      ipratropium-albuterol (DUONEB) 0.5-2.5 (3) MG/3ML nebulizer solution Take 3 mL by nebulization every 6 hours as needed for Shortness of Breath. 180 Each 4    cyanocobalamin (VITAMIN B-12) 1000 MCG/ML Solution Inject 1,000 mcg into the shoulder, thigh, or buttocks. Twice a month      fluticasone (FLONASE SENSIMIST) 27.5  "MCG/SPRAY nasal spray Administer 2 Sprays into affected nostril(S) every day.      omeprazole (PRILOSEC) 40 MG delayed-release capsule Take 1 Cap by mouth every day. 90 Cap 3    simvastatin (ZOCOR) 20 MG Tab TAKE 1 TABLET EVERY EVENING (Patient taking differently: Take 20 mg by mouth every evening. TAKE 1 TABLET EVERY EVENING) 90 Tab 3     No current facility-administered medications on file prior to visit.       Patient has no known allergies.      ROS:   Review of Systems   Constitutional:  Negative for chills, diaphoresis, fever, malaise/fatigue and weight loss.   HENT:  Negative for congestion, ear discharge, ear pain, hearing loss, nosebleeds, sinus pain, sore throat and tinnitus.    Eyes:  Negative for blurred vision, double vision, photophobia, pain, discharge and redness.   Respiratory:  Negative for cough, hemoptysis, sputum production, shortness of breath, wheezing and stridor.    Cardiovascular:  Negative for chest pain, palpitations, orthopnea, claudication, leg swelling and PND.   Gastrointestinal:  Negative for abdominal pain, constipation, diarrhea, heartburn, nausea and vomiting.   Genitourinary:  Negative for dysuria and urgency.   Musculoskeletal:  Negative for back pain, falls, joint pain, myalgias and neck pain.   Skin:  Negative for itching and rash.   Neurological:  Negative for dizziness, tremors, speech change, focal weakness, weakness and headaches.   Endo/Heme/Allergies:  Negative for environmental allergies.   Psychiatric/Behavioral:  Negative for depression.        /64 (BP Location: Right arm, Patient Position: Sitting, BP Cuff Size: Adult)   Pulse 74   Ht 1.626 m (5' 4\")   Wt 72.1 kg (159 lb)   SpO2 97%   Physical Exam  Constitutional:       General: She is not in acute distress.     Appearance: Normal appearance. She is well-developed and normal weight.   HENT:      Head: Normocephalic and atraumatic.      Right Ear: External ear normal.      Left Ear: External ear normal.     "  Nose: Nose normal. No congestion.      Mouth/Throat:      Mouth: Mucous membranes are moist.      Pharynx: Oropharynx is clear. No oropharyngeal exudate.   Eyes:      General: No scleral icterus.     Extraocular Movements: Extraocular movements intact.      Conjunctiva/sclera: Conjunctivae normal.      Pupils: Pupils are equal, round, and reactive to light.   Neck:      Vascular: No JVD.      Trachea: No tracheal deviation.   Cardiovascular:      Rate and Rhythm: Normal rate and regular rhythm.      Heart sounds: Normal heart sounds. No murmur heard.     No friction rub. No gallop.   Pulmonary:      Effort: Pulmonary effort is normal. No accessory muscle usage or respiratory distress.      Breath sounds: Normal breath sounds. No wheezing or rales.   Abdominal:      General: There is no distension.      Palpations: Abdomen is soft.      Tenderness: There is no abdominal tenderness.   Musculoskeletal:         General: No tenderness or deformity. Normal range of motion.      Cervical back: Normal range of motion and neck supple.      Right lower leg: No edema.      Left lower leg: No edema.   Lymphadenopathy:      Cervical: No cervical adenopathy.   Skin:     General: Skin is warm and dry.      Findings: No rash.      Nails: There is no clubbing.   Neurological:      Mental Status: She is alert and oriented to person, place, and time.      Cranial Nerves: No cranial nerve deficit.      Gait: Gait normal.   Psychiatric:         Behavior: Behavior normal.         PFTs as reviewed by me personally: as per hPI    Imaging as reviewed by me personally:  as per HPI    Assessment:  1. Mild intermittent asthma without complication  Spirometry      2. Chronic cough            Plan:  Chronic, well controlled on Breztri 1 puff BID which we will continue off label given good control. She has significant bruising and is concerned this may be due to ICS. I offered a trial of stiolto but she will discuss with dermatology first given  good control of sxs on low dose ICS with breztri one puff BID. Spirometry at f/u  2/2 allergies and RAD. Well controlled on breztri dosed at half dose as per above and montelukast.   Return in about 6 months (around 4/11/2024) for spirometry .

## 2023-10-13 ENCOUNTER — HOSPITAL ENCOUNTER (OUTPATIENT)
Dept: LAB | Facility: MEDICAL CENTER | Age: 73
End: 2023-10-13
Attending: OPTOMETRIST
Payer: MEDICARE

## 2023-10-13 PROCEDURE — 36415 COLL VENOUS BLD VENIPUNCTURE: CPT

## 2023-10-13 PROCEDURE — 83520 IMMUNOASSAY QUANT NOS NONAB: CPT

## 2023-11-01 LAB — MISCELLANEOUS LAB RESULT MISCLAB: NORMAL

## 2023-11-29 ENCOUNTER — PATIENT MESSAGE (OUTPATIENT)
Dept: HEALTH INFORMATION MANAGEMENT | Facility: OTHER | Age: 73
End: 2023-11-29

## 2024-02-02 ENCOUNTER — HOSPITAL ENCOUNTER (OUTPATIENT)
Dept: LAB | Facility: MEDICAL CENTER | Age: 74
End: 2024-02-02
Attending: FAMILY MEDICINE
Payer: MEDICARE

## 2024-02-02 LAB
ALBUMIN SERPL BCP-MCNC: 4.6 G/DL (ref 3.2–4.9)
ALBUMIN/GLOB SERPL: 1.6 G/DL
ALP SERPL-CCNC: 63 U/L (ref 30–99)
ALT SERPL-CCNC: 13 U/L (ref 2–50)
ANION GAP SERPL CALC-SCNC: 14 MMOL/L (ref 7–16)
AST SERPL-CCNC: 20 U/L (ref 12–45)
BASOPHILS # BLD AUTO: 0.7 % (ref 0–1.8)
BASOPHILS # BLD: 0.04 K/UL (ref 0–0.12)
BILIRUB SERPL-MCNC: 0.7 MG/DL (ref 0.1–1.5)
BUN SERPL-MCNC: 16 MG/DL (ref 8–22)
CALCIUM ALBUM COR SERPL-MCNC: 8.8 MG/DL (ref 8.5–10.5)
CALCIUM SERPL-MCNC: 9.3 MG/DL (ref 8.5–10.5)
CHLORIDE SERPL-SCNC: 101 MMOL/L (ref 96–112)
CHOLEST SERPL-MCNC: 195 MG/DL (ref 100–199)
CO2 SERPL-SCNC: 24 MMOL/L (ref 20–33)
CREAT SERPL-MCNC: 0.76 MG/DL (ref 0.5–1.4)
EOSINOPHIL # BLD AUTO: 0.11 K/UL (ref 0–0.51)
EOSINOPHIL NFR BLD: 2 % (ref 0–6.9)
ERYTHROCYTE [DISTWIDTH] IN BLOOD BY AUTOMATED COUNT: 36.9 FL (ref 35.9–50)
GFR SERPLBLD CREATININE-BSD FMLA CKD-EPI: 82 ML/MIN/1.73 M 2
GLOBULIN SER CALC-MCNC: 2.8 G/DL (ref 1.9–3.5)
GLUCOSE SERPL-MCNC: 85 MG/DL (ref 65–99)
HCT VFR BLD AUTO: 38.3 % (ref 37–47)
HDLC SERPL-MCNC: 71 MG/DL
HGB BLD-MCNC: 11.9 G/DL (ref 12–16)
IMM GRANULOCYTES # BLD AUTO: 0.01 K/UL (ref 0–0.11)
IMM GRANULOCYTES NFR BLD AUTO: 0.2 % (ref 0–0.9)
LDLC SERPL CALC-MCNC: 107 MG/DL
LYMPHOCYTES # BLD AUTO: 1.71 K/UL (ref 1–4.8)
LYMPHOCYTES NFR BLD: 31.5 % (ref 22–41)
MCH RBC QN AUTO: 20.3 PG (ref 27–33)
MCHC RBC AUTO-ENTMCNC: 31.1 G/DL (ref 32.2–35.5)
MCV RBC AUTO: 65.5 FL (ref 81.4–97.8)
MONOCYTES # BLD AUTO: 0.32 K/UL (ref 0–0.85)
MONOCYTES NFR BLD AUTO: 5.9 % (ref 0–13.4)
NEUTROPHILS # BLD AUTO: 3.23 K/UL (ref 1.82–7.42)
NEUTROPHILS NFR BLD: 59.7 % (ref 44–72)
NRBC # BLD AUTO: 0 K/UL
NRBC BLD-RTO: 0 /100 WBC (ref 0–0.2)
PLATELET # BLD AUTO: 290 K/UL (ref 164–446)
POTASSIUM SERPL-SCNC: 4 MMOL/L (ref 3.6–5.5)
PROT SERPL-MCNC: 7.4 G/DL (ref 6–8.2)
RBC # BLD AUTO: 5.85 M/UL (ref 4.2–5.4)
SODIUM SERPL-SCNC: 139 MMOL/L (ref 135–145)
T4 FREE SERPL-MCNC: 1.21 NG/DL (ref 0.93–1.7)
TRIGL SERPL-MCNC: 86 MG/DL (ref 0–149)
TSH SERPL DL<=0.005 MIU/L-ACNC: 2.78 UIU/ML (ref 0.38–5.33)
WBC # BLD AUTO: 5.4 K/UL (ref 4.8–10.8)

## 2024-02-02 PROCEDURE — 80061 LIPID PANEL: CPT

## 2024-02-02 PROCEDURE — 36415 COLL VENOUS BLD VENIPUNCTURE: CPT

## 2024-02-02 PROCEDURE — 85025 COMPLETE CBC W/AUTO DIFF WBC: CPT

## 2024-02-02 PROCEDURE — 80053 COMPREHEN METABOLIC PANEL: CPT

## 2024-02-02 PROCEDURE — 84439 ASSAY OF FREE THYROXINE: CPT

## 2024-02-02 PROCEDURE — 84443 ASSAY THYROID STIM HORMONE: CPT

## 2024-02-05 ENCOUNTER — APPOINTMENT (RX ONLY)
Dept: URBAN - METROPOLITAN AREA CLINIC 6 | Facility: CLINIC | Age: 74
Setting detail: DERMATOLOGY
End: 2024-02-05

## 2024-02-05 DIAGNOSIS — L57.0 ACTINIC KERATOSIS: ICD-10-CM

## 2024-02-05 DIAGNOSIS — Z71.89 OTHER SPECIFIED COUNSELING: ICD-10-CM

## 2024-02-05 DIAGNOSIS — Z85.828 PERSONAL HISTORY OF OTHER MALIGNANT NEOPLASM OF SKIN: ICD-10-CM

## 2024-02-05 DIAGNOSIS — D18.0 HEMANGIOMA: ICD-10-CM

## 2024-02-05 DIAGNOSIS — L81.4 OTHER MELANIN HYPERPIGMENTATION: ICD-10-CM

## 2024-02-05 DIAGNOSIS — D22 MELANOCYTIC NEVI: ICD-10-CM

## 2024-02-05 DIAGNOSIS — L82.1 OTHER SEBORRHEIC KERATOSIS: ICD-10-CM

## 2024-02-05 PROBLEM — D23.72 OTHER BENIGN NEOPLASM OF SKIN OF LEFT LOWER LIMB, INCLUDING HIP: Status: ACTIVE | Noted: 2024-02-05

## 2024-02-05 PROBLEM — D22.5 MELANOCYTIC NEVI OF TRUNK: Status: ACTIVE | Noted: 2024-02-05

## 2024-02-05 PROBLEM — D18.01 HEMANGIOMA OF SKIN AND SUBCUTANEOUS TISSUE: Status: ACTIVE | Noted: 2024-02-05

## 2024-02-05 PROBLEM — D48.5 NEOPLASM OF UNCERTAIN BEHAVIOR OF SKIN: Status: ACTIVE | Noted: 2024-02-05

## 2024-02-05 PROCEDURE — ? BIOPSY BY SHAVE METHOD

## 2024-02-05 PROCEDURE — 11102 TANGNTL BX SKIN SINGLE LES: CPT

## 2024-02-05 PROCEDURE — ? COUNSELING

## 2024-02-05 PROCEDURE — 11103 TANGNTL BX SKIN EA SEP/ADDL: CPT

## 2024-02-05 PROCEDURE — 99213 OFFICE O/P EST LOW 20 MIN: CPT | Mod: 25

## 2024-02-05 PROCEDURE — ? LIQUID NITROGEN

## 2024-02-05 PROCEDURE — ? SUNSCREEN TREATMENT REGIMEN

## 2024-02-05 PROCEDURE — ? SUNSCREEN RECOMMENDATIONS

## 2024-02-05 PROCEDURE — 17000 DESTRUCT PREMALG LESION: CPT | Mod: 59

## 2024-02-05 ASSESSMENT — LOCATION DETAILED DESCRIPTION DERM
LOCATION DETAILED: RIGHT MEDIAL FOREHEAD
LOCATION DETAILED: MIDDLE STERNUM
LOCATION DETAILED: RIGHT FOREHEAD
LOCATION DETAILED: LEFT PROXIMAL DORSAL FOREARM
LOCATION DETAILED: RIGHT RADIAL DORSAL HAND
LOCATION DETAILED: EPIGASTRIC SKIN
LOCATION DETAILED: LEFT DORSAL MIDDLE METACARPOPHALANGEAL JOINT
LOCATION DETAILED: SUBXIPHOID

## 2024-02-05 ASSESSMENT — LOCATION ZONE DERM
LOCATION ZONE: FACE
LOCATION ZONE: ARM
LOCATION ZONE: HAND
LOCATION ZONE: TRUNK

## 2024-02-05 ASSESSMENT — LOCATION SIMPLE DESCRIPTION DERM
LOCATION SIMPLE: LEFT FOREARM
LOCATION SIMPLE: RIGHT HAND
LOCATION SIMPLE: LEFT HAND
LOCATION SIMPLE: ABDOMEN
LOCATION SIMPLE: CHEST
LOCATION SIMPLE: RIGHT FOREHEAD

## 2024-02-05 NOTE — PROCEDURE: LIQUID NITROGEN
Detail Level: Detailed
Consent: The patient's consent was obtained including but not limited to risks of crusting, scabbing, blistering, scarring, darker or lighter pigmentary change, recurrence, incomplete removal and infection.
Duration Of Freeze Thaw-Cycle (Seconds): 10
Render Note In Bullet Format When Appropriate: No
Show Aperture Variable?: Yes
Post-Care Instructions: I reviewed with the patient in detail post-care instructions. Patient is to wear sunprotection, and avoid picking at any of the treated lesions. Pt may apply Vaseline to crusted or scabbing areas.
Number Of Freeze-Thaw Cycles: 2 freeze-thaw cycles

## 2024-02-12 ENCOUNTER — RX ONLY (OUTPATIENT)
Age: 74
Setting detail: RX ONLY
End: 2024-02-12

## 2024-02-12 RX ORDER — IMIQUIMOD 12.5 MG/.25G
1 CREAM TOPICAL QD
Qty: 48 | Refills: 0 | Status: ERX | COMMUNITY
Start: 2024-02-12

## 2024-03-13 ENCOUNTER — HOSPITAL ENCOUNTER (OUTPATIENT)
Dept: RADIOLOGY | Facility: MEDICAL CENTER | Age: 74
End: 2024-03-13
Attending: FAMILY MEDICINE
Payer: MEDICARE

## 2024-03-13 DIAGNOSIS — M81.0 AGE-RELATED OSTEOPOROSIS WITHOUT CURRENT PATHOLOGICAL FRACTURE: ICD-10-CM

## 2024-03-13 DIAGNOSIS — Z12.31 VISIT FOR SCREENING MAMMOGRAM: ICD-10-CM

## 2024-03-13 PROCEDURE — 77067 SCR MAMMO BI INCL CAD: CPT

## 2024-03-13 PROCEDURE — 77080 DXA BONE DENSITY AXIAL: CPT

## 2024-03-28 ENCOUNTER — APPOINTMENT (RX ONLY)
Dept: URBAN - METROPOLITAN AREA CLINIC 6 | Facility: CLINIC | Age: 74
Setting detail: DERMATOLOGY
End: 2024-03-28

## 2024-03-28 DIAGNOSIS — Z86.007 PERSONAL HISTORY OF IN-SITU NEOPLASM OF SKIN: ICD-10-CM

## 2024-03-28 PROBLEM — C44.519 BASAL CELL CARCINOMA OF SKIN OF OTHER PART OF TRUNK: Status: ACTIVE | Noted: 2024-03-28

## 2024-03-28 PROCEDURE — ? COUNSELING

## 2024-03-28 PROCEDURE — 17262 DSTRJ MAL LES T/A/L 1.1-2.0: CPT

## 2024-03-28 PROCEDURE — ? ADDITIONAL NOTES

## 2024-03-28 PROCEDURE — ? CURETTAGE AND DESTRUCTION

## 2024-03-28 PROCEDURE — 99212 OFFICE O/P EST SF 10 MIN: CPT | Mod: 25

## 2024-03-28 ASSESSMENT — LOCATION DETAILED DESCRIPTION DERM: LOCATION DETAILED: LEFT PROXIMAL CALF

## 2024-03-28 ASSESSMENT — LOCATION ZONE DERM: LOCATION ZONE: LEG

## 2024-03-28 ASSESSMENT — LOCATION SIMPLE DESCRIPTION DERM: LOCATION SIMPLE: LEFT CALF

## 2024-03-28 NOTE — PROCEDURE: CURETTAGE AND DESTRUCTION
Detail Level: Detailed
Biopsy Photograph Reviewed: Yes
Accession # (Optional): [id number]
Number Of Curettages: 3
Size Of Lesion In Cm: 0.8
Size Of Lesion After Curettage: 1.1
Add Intralesional Injection: No
Concentration (Mg/Ml Or Millions Of Plaque Forming Units/Cc): 0.01
Total Volume (Ccs): 1
Anesthesia Type: 1% lidocaine with epinephrine and a 1:10 solution of 8.4% sodium bicarbonate
Cautery Type: electrodesiccation
What Was Performed First?: Curettage
Final Size Statement: The size of the lesion after curettage was
Additional Information: (Optional): The wound was cleaned, and a pressure dressing was applied.  The patient received detailed post-op instructions.
Consent was obtained from the patient. The risks, benefits and alternatives to therapy were discussed in detail. Specifically, the risks of infection, scarring, bleeding, prolonged wound healing, nerve injury, incomplete removal, allergy to anesthesia and recurrence were addressed. Alternatives to ED&C, such as: surgical removal and XRT were also discussed.  Prior to the procedure, the treatment site was clearly identified and confirmed by the patient. All components of Universal Protocol/PAUSE Rule completed.
Post-Care Instructions: I reviewed with the patient in detail post-care instructions. Patient is to keep the area dry for 48 hours, and not to engage in any swimming until the area is healed. Should the patient develop any fevers, chills, bleeding, severe pain patient will contact the office immediately.
Bill As A Line Item Or As Units: Line Item

## 2024-03-28 NOTE — PROCEDURE: ADDITIONAL NOTES
Detail Level: Simple
Render Risk Assessment In Note?: no
Additional Notes: [id number]- Treated with Imiquimod, healing well

## 2024-04-30 ENCOUNTER — NON-PROVIDER VISIT (OUTPATIENT)
Dept: SLEEP MEDICINE | Facility: MEDICAL CENTER | Age: 74
End: 2024-04-30
Attending: INTERNAL MEDICINE
Payer: MEDICARE

## 2024-04-30 VITALS
DIASTOLIC BLOOD PRESSURE: 66 MMHG | HEIGHT: 64 IN | HEART RATE: 104 BPM | WEIGHT: 164 LBS | OXYGEN SATURATION: 98 % | SYSTOLIC BLOOD PRESSURE: 122 MMHG | BODY MASS INDEX: 28 KG/M2

## 2024-04-30 VITALS — HEIGHT: 64 IN | WEIGHT: 158 LBS | BODY MASS INDEX: 26.98 KG/M2

## 2024-04-30 DIAGNOSIS — J44.9 OBSTRUCTIVE LUNG DISEASE (GENERALIZED) (HCC): ICD-10-CM

## 2024-04-30 DIAGNOSIS — R05.3 CHRONIC COUGH: ICD-10-CM

## 2024-04-30 DIAGNOSIS — J45.20 MILD INTERMITTENT ASTHMA WITHOUT COMPLICATION: ICD-10-CM

## 2024-04-30 PROCEDURE — 99212 OFFICE O/P EST SF 10 MIN: CPT | Performed by: INTERNAL MEDICINE

## 2024-04-30 PROCEDURE — 94060 EVALUATION OF WHEEZING: CPT | Performed by: INTERNAL MEDICINE

## 2024-04-30 RX ORDER — METHYLPREDNISOLONE 4 MG/1
TABLET ORAL
Qty: 21 TABLET | Refills: 0 | Status: SHIPPED | OUTPATIENT
Start: 2024-04-30

## 2024-04-30 RX ORDER — AZITHROMYCIN 250 MG/1
TABLET, FILM COATED ORAL
Qty: 6 TABLET | Refills: 0 | Status: SHIPPED | OUTPATIENT
Start: 2024-04-30

## 2024-04-30 ASSESSMENT — ENCOUNTER SYMPTOMS
PND: 0
SPUTUM PRODUCTION: 0
ORTHOPNEA: 0
CLAUDICATION: 0
WEIGHT LOSS: 0
SORE THROAT: 0
WEAKNESS: 0
EYE PAIN: 0
ABDOMINAL PAIN: 0
SINUS PAIN: 0
WHEEZING: 0
TREMORS: 0
CONSTIPATION: 0
VOMITING: 0
PALPITATIONS: 0
STRIDOR: 0
HEADACHES: 0
FALLS: 0
BLURRED VISION: 0
HEMOPTYSIS: 0
SHORTNESS OF BREATH: 0
MYALGIAS: 0
FOCAL WEAKNESS: 0
DIZZINESS: 0
EYE DISCHARGE: 0
NECK PAIN: 0
SPEECH CHANGE: 0
DIAPHORESIS: 0
COUGH: 0
PHOTOPHOBIA: 0
EYE REDNESS: 0
NAUSEA: 0
DEPRESSION: 0
HEARTBURN: 0
DIARRHEA: 0
CHILLS: 0
BACK PAIN: 0
FEVER: 0
DOUBLE VISION: 0

## 2024-04-30 ASSESSMENT — PULMONARY FUNCTION TESTS
FEV1/FVC_PERCENT_CHANGE: 600
FEV1/FVC_PERCENT_PREDICTED: 106
FVC_PERCENT_PREDICTED: 109
FEV1/FVC: 86.39
FVC: 2.91
FEV1/FVC: 82
FVC_PERCENT_PREDICTED: 108
FEV1_PERCENT_CHANGE: 6
FVC_PREDICTED: 2.69
FVC: 2.94
FEV1_PERCENT_PREDICTED: 123
FEV1_PREDICTED: 2.06
FEV1_PERCENT_CHANGE: 1
FEV1/FVC_PERCENT_PREDICTED: 113
FEV1_PREDICTED: 115
FEV1/FVC_PREDICTED: 76.58
FEV1: 2.54
FEV1: 2.38

## 2024-04-30 ASSESSMENT — FIBROSIS 4 INDEX
FIB4 SCORE: 1.43
FIB4 SCORE: 1.43

## 2024-04-30 ASSESSMENT — PATIENT HEALTH QUESTIONNAIRE - PHQ9: CLINICAL INTERPRETATION OF PHQ2 SCORE: 0

## 2024-04-30 NOTE — PROGRESS NOTES
Chief Complaint   Patient presents with    Follow-Up     LAST SEEN 10/11/23    Results     MARIA C 4/30/24         HPI: This patient is a 74 y.o. female whom is followed in our clinic for mild asthma last seen by me on 10/11/23.  Pt has a long hx of asthma dating back to childhood.  She is a lifelong non-smoker.  She has been followed in our clinic for cough which began in June of 2020.  Symptoms were initially just a cough that seemed to improve with treatment for reactive airways disease for which she was on Breo 200, montelukast and prn STACIE. We did a trial of trelegy which was not more beneficial than Breo.  When cough persisted she had a w/u with GI and is also followed by ENT. She is treated for GERD and manometry study showed esophageal dysmotility but GERD was well controlled. 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 PFTs from 8/27/21 were wnls. CT chest from 8/28/21 showed no airway thickening, no inflammation or infiltrates, no LAD. She c/o severe dry mouth and serologic testing of sjogrens syndrome was neg in 2016 and again in fall of 2022.  We referred her for allergy testing and she saw Dr Faustin who tried her on breztri. She had allergies confirmed but her cough has remained well controlled on breztri, one puff daily. She also has noted subjective improvement in exercise tolerance on this regimen. Spirometry today is normal. No acute concerns.     Past Medical History:   Diagnosis Date    HLD (hyperlipidemia) 7/7/2015    Mild intermittent asthma without complication 7/19/2016       Social History     Socioeconomic History    Marital status:      Spouse name: Not on file    Number of children: Not on file    Years of education: Not on file    Highest education level: Not on file   Occupational History    Occupation: Education      Comment: Retired   Tobacco Use    Smoking status: Never     Passive exposure: Past     Smokeless tobacco: Never    Tobacco comments:     2nd hand exposure/parents smoked    Vaping Use    Vaping Use: Never used   Substance and Sexual Activity    Alcohol use: No     Alcohol/week: 0.0 oz    Drug use: No    Sexual activity: Not on file   Other Topics Concern    Not on file   Social History Narrative    Not on file     Social Determinants of Health     Financial Resource Strain: Not on file   Food Insecurity: Not on file   Transportation Needs: Not on file   Physical Activity: Not on file   Stress: Not on file   Social Connections: Not on file   Intimate Partner Violence: Not on file   Housing Stability: Not on file       Family History   Problem Relation Age of Onset    Heart Disease Mother     Hyperlipidemia Mother     Hypertension Mother     Stroke Mother     Other Father         Plane crash    No Known Problems Sister     Heart Attack Maternal Aunt     Heart Attack Maternal Grandmother        Current Outpatient Medications on File Prior to Visit   Medication Sig Dispense Refill    montelukast (SINGULAIR) 10 MG Tab Take 1 Tablet by mouth every day. 90 Tablet 3    albuterol (PROAIR HFA) 108 (90 Base) MCG/ACT Aero Soln inhalation aerosol USE 2 INHALATIONS EVERY 6 HOURS AS NEEDED FOR SHORTNESS OF BREATH 3 Each 3    ipratropium-albuterol (DUONEB) 0.5-2.5 (3) MG/3ML nebulizer solution Take 3 mL by nebulization every 6 hours as needed for Shortness of Breath. 180 Each 4    fluticasone (FLONASE SENSIMIST) 27.5 MCG/SPRAY nasal spray Administer 2 Sprays into affected nostril(S) every day.      Probiotic Product (PROBIOTIC-10 PO) Take  by mouth.      Xylitol (XYLIMELTS MT) Use  in the mouth or throat.      Epinastine HCl 0.05 % Solution Administer  into affected eye(s) as needed. PRN      Mouthwashes (BIOTENE/CALCIUM PBF) Liquid Use  in the mouth or throat. And spray      cyanocobalamin (VITAMIN B-12) 1000 MCG/ML Solution Inject 1,000 mcg into the shoulder, thigh, or buttocks. Twice a month      omeprazole  "(PRILOSEC) 40 MG delayed-release capsule Take 1 Cap by mouth every day. 90 Cap 3    simvastatin (ZOCOR) 20 MG Tab TAKE 1 TABLET EVERY EVENING (Patient taking differently: Take 20 mg by mouth every evening. TAKE 1 TABLET EVERY EVENING) 90 Tab 3     No current facility-administered medications on file prior to visit.       Patient has no known allergies.      ROS:   Review of Systems   Constitutional:  Negative for chills, diaphoresis, fever, malaise/fatigue and weight loss.   HENT:  Negative for congestion, ear discharge, ear pain, hearing loss, nosebleeds, sinus pain, sore throat and tinnitus.    Eyes:  Negative for blurred vision, double vision, photophobia, pain, discharge and redness.   Respiratory:  Negative for cough, hemoptysis, sputum production, shortness of breath, wheezing and stridor.    Cardiovascular:  Negative for chest pain, palpitations, orthopnea, claudication, leg swelling and PND.   Gastrointestinal:  Negative for abdominal pain, constipation, diarrhea, heartburn, nausea and vomiting.   Genitourinary:  Negative for dysuria and urgency.   Musculoskeletal:  Negative for back pain, falls, joint pain, myalgias and neck pain.   Skin:  Negative for itching and rash.   Neurological:  Negative for dizziness, tremors, speech change, focal weakness, weakness and headaches.   Endo/Heme/Allergies:  Negative for environmental allergies.   Psychiatric/Behavioral:  Negative for depression.        /66 (BP Location: Right arm, Patient Position: Sitting, BP Cuff Size: Adult)   Pulse (!) 104   Ht 1.626 m (5' 4\")   Wt 74.4 kg (164 lb)   SpO2 98%   Physical Exam  Constitutional:       General: She is not in acute distress.     Appearance: Normal appearance. She is well-developed and normal weight.   HENT:      Head: Normocephalic and atraumatic.      Right Ear: External ear normal.      Left Ear: External ear normal.      Nose: Nose normal. No congestion.      Mouth/Throat:      Mouth: Mucous membranes are " moist.      Pharynx: Oropharynx is clear. No oropharyngeal exudate.   Eyes:      General: No scleral icterus.     Extraocular Movements: Extraocular movements intact.      Conjunctiva/sclera: Conjunctivae normal.      Pupils: Pupils are equal, round, and reactive to light.   Neck:      Vascular: No JVD.      Trachea: No tracheal deviation.   Cardiovascular:      Rate and Rhythm: Normal rate and regular rhythm.      Heart sounds: Normal heart sounds. No murmur heard.     No friction rub. No gallop.   Pulmonary:      Effort: Pulmonary effort is normal. No accessory muscle usage or respiratory distress.      Breath sounds: Normal breath sounds. No wheezing or rales.   Abdominal:      General: There is no distension.      Palpations: Abdomen is soft.      Tenderness: There is no abdominal tenderness.   Musculoskeletal:         General: No tenderness or deformity. Normal range of motion.      Cervical back: Normal range of motion and neck supple.      Right lower leg: No edema.      Left lower leg: No edema.   Lymphadenopathy:      Cervical: No cervical adenopathy.   Skin:     General: Skin is warm and dry.      Findings: No rash.      Nails: There is no clubbing.   Neurological:      Mental Status: She is alert and oriented to person, place, and time.      Cranial Nerves: No cranial nerve deficit.      Gait: Gait normal.   Psychiatric:         Behavior: Behavior normal.         PFTs as reviewed by me personally: as per hPI    Imaging as reviewed by me personally:  as per hPI    Assessment:  1. Obstructive lung disease (generalized) (HCC)  Budeson-Glycopyrrol-Formoterol (BREZTRI AEROSPHERE) 160-9-4.8 MCG/ACT Aerosol    methylPREDNISolone (MEDROL DOSEPAK) 4 MG Tablet Therapy Pack    azithromycin (ZITHROMAX) 250 MG Tab      2. Mild intermittent asthma without complication        3. Chronic cough            Plan:  Mild, well controlled on low dose ICS/LAMA/LABA. I did provider her with emergency abx and prednisone for  upcoming travel.  See above. Chronic, stable, well controlled with no obstruction on spirometry  Presumed RAD. GERD well controlled. Cough controlled on Breztri one puff daily  Return in about 6 months (around 10/30/2024) for asthma.

## 2024-04-30 NOTE — PROCEDURES
Good patient effort & cooperation. The results of this test meet the ATS standards for acceptability and repeatability. Predicted values were GLI-2012. A bronchodilator of Ventolin HFA-2 puffs with a space was administered to the patient.    Interpretation;  Baseline spirometry shows normal air flows.  There is trend toward bronchodilator response but does not meet ATS criteria.   Normal spirometry with normal flow volume loop.

## 2024-05-23 ENCOUNTER — APPOINTMENT (RX ONLY)
Dept: URBAN - METROPOLITAN AREA CLINIC 36 | Facility: CLINIC | Age: 74
Setting detail: DERMATOLOGY
End: 2024-05-23

## 2024-05-23 DIAGNOSIS — Z41.9 ENCOUNTER FOR PROCEDURE FOR PURPOSES OTHER THAN REMEDYING HEALTH STATE, UNSPECIFIED: ICD-10-CM

## 2024-05-23 PROCEDURE — ? COSMETIC CONSULTATION: ELLACOR MICRO-CORING

## 2024-05-23 ASSESSMENT — LOCATION ZONE DERM: LOCATION ZONE: FACE

## 2024-05-23 ASSESSMENT — LOCATION SIMPLE DESCRIPTION DERM: LOCATION SIMPLE: LEFT CHEEK

## 2024-05-23 ASSESSMENT — LOCATION DETAILED DESCRIPTION DERM: LOCATION DETAILED: LEFT SUPERIOR CENTRAL BUCCAL CHEEK

## 2024-05-31 ENCOUNTER — TELEPHONE (OUTPATIENT)
Dept: SLEEP MEDICINE | Facility: MEDICAL CENTER | Age: 74
End: 2024-05-31
Payer: MEDICARE

## 2024-05-31 DIAGNOSIS — J44.9 OBSTRUCTIVE LUNG DISEASE (GENERALIZED) (HCC): ICD-10-CM

## 2024-05-31 NOTE — TELEPHONE ENCOUNTER
Dr. Sims, patient called requesting a 1 year supply for her Breztri.    Pharmacy: [facility]      Last Seen: 4/30/24

## 2024-07-16 ENCOUNTER — APPOINTMENT (RX ONLY)
Dept: URBAN - METROPOLITAN AREA CLINIC 36 | Facility: CLINIC | Age: 74
Setting detail: DERMATOLOGY
End: 2024-07-16

## 2024-07-16 DIAGNOSIS — Z41.9 ENCOUNTER FOR PROCEDURE FOR PURPOSES OTHER THAN REMEDYING HEALTH STATE, UNSPECIFIED: ICD-10-CM

## 2024-07-16 PROCEDURE — ? ELLACOR MICRO-CORING

## 2024-07-16 PROCEDURE — ? PRO-NOX

## 2024-07-16 NOTE — PROCEDURE: ELLACOR MICRO-CORING
Detail Level: Zone
Core Depth In Mm: 2
Skin Type: II
Core Depth In Mm: 2.5
Consent: Written consent obtained, risks reviewed including but not limited to: redness, swelling, bruising, burning, dryness, roughness, tightness/pulling of skin, crusting, pain/discomfort, tenderness, tingling, bleeding, numbness, skin peeling, circular marks on the skin, itching, hyper/hypopigmentation, infection, and scarring.    Patient understands the procedure is cosmetic in nature and will require out of pocket payment.
Location #1: cheeks and perioral
Anesthesia Type: 1% lidocaine with epinephrine
Treatment Number (Optional): 1
Core Depth In Mm: 4
Anesthesia Volume In Cc: 24
Total Number Of Cores: 58280
Location #2: forehead
Post-Care Instructions: After the procedure, keep the treated area covered with ointment for 1-2 days.  Avoid makeup and harsh skin products for one week.   Practice diligent sun protection and sun avoidance.  Typical downtime is 3 days to 1 week.
Location #3: jawline
Length Of Topical Anesthesia Application (Optional): 60 minutes
Skin Removal Percentage: 8
Topical Anesthesia?: 7% tetracaine, 23% lidocaine
Number Of Prepaid Treatments (Will Not Render If 0): 0
Total Number Of Cores: 17,199

## 2024-07-16 NOTE — PROCEDURE: PRO-NOX
Consent: Written consent obtained, risks reviewed including but not limited to euphoria, light-headedness, nausea, vomiting and dizziness.
Detail Level: Zone
Pre-Procedure Text: The patient was connected to the Pro-Nox machine via mask and positioned appropriately for the anticipated procedure.  Following the procedure they were disconnected and monitored for any lasting side effects prior to leaving the office.
Post-Care Instructions: The patient was instructed to call the office if they had any lasting side effects or concerns.

## 2024-08-15 ENCOUNTER — APPOINTMENT (RX ONLY)
Dept: URBAN - METROPOLITAN AREA CLINIC 36 | Facility: CLINIC | Age: 74
Setting detail: DERMATOLOGY
End: 2024-08-15

## 2024-08-15 DIAGNOSIS — Z41.9 ENCOUNTER FOR PROCEDURE FOR PURPOSES OTHER THAN REMEDYING HEALTH STATE, UNSPECIFIED: ICD-10-CM

## 2024-08-15 PROCEDURE — ? BOTOX

## 2024-08-15 PROCEDURE — ? ADDITIONAL NOTES

## 2024-08-15 PROCEDURE — ? COSMETIC CONSULTATION: BOTOX

## 2024-08-15 PROCEDURE — ? COSMETIC CONSULTATION: FILLERS

## 2024-08-15 PROCEDURE — ? COSMETIC CONSULTATION: LASER RESURFACING

## 2024-08-15 PROCEDURE — ? COSMETIC CONSULTATION: FACIAL

## 2024-08-15 NOTE — PROCEDURE: BOTOX
Lateral Platysmal Bands Units: 0
Show Lateral Platysmal Band Units: Yes
Show Right And Left Brow Units: No
Forehead Units: 10
Price (Use Numbers Only, No Special Characters Or $): 26
Inferior Lateral Orbicularis Oculi Units: 15
Detail Level: Detailed
Glabellar Complex Units: 20
Depressor Anguli Oris Units: 5
Consent: Written consent obtained. Risks include but not limited to lid/brow ptosis, bruising, swelling, diplopia, temporary effect, incomplete chemical denervation.
Post-Care Instructions: Patient instructed to not lie down for 4 hours and limit physical activity for 24 hours. Patient instructed not to travel by airplane for 48 hours.
Lot #: [id number]
Additional Area 1 Location: 2 units to right DLI
Reconstitution Date (Optional): 9/26
Incrementing Botox Units: By 0.5 Units

## 2024-08-15 NOTE — PROCEDURE: ADDITIONAL NOTES
Render Risk Assessment In Note?: yes
Detail Level: Zone
Additional Notes: Pt is pleased with results and would like to plan for further treatments and what dr. Gay might have appropriate for Keiko. Dr. Gay is very pleased with patients results.
Additional Notes: CO2 laser for eyes\\nSculptra to help rebuild collagen\\nBotox in forehead and in DAOs and chin

## 2024-08-27 ENCOUNTER — APPOINTMENT (RX ONLY)
Dept: URBAN - METROPOLITAN AREA CLINIC 36 | Facility: CLINIC | Age: 74
Setting detail: DERMATOLOGY
End: 2024-08-27

## 2024-08-27 DIAGNOSIS — L72.8 OTHER FOLLICULAR CYSTS OF THE SKIN AND SUBCUTANEOUS TISSUE: ICD-10-CM

## 2024-08-27 DIAGNOSIS — Z41.9 ENCOUNTER FOR PROCEDURE FOR PURPOSES OTHER THAN REMEDYING HEALTH STATE, UNSPECIFIED: ICD-10-CM

## 2024-08-27 PROCEDURE — ? ADDITIONAL NOTES

## 2024-08-27 PROCEDURE — ? DEFER

## 2024-08-27 PROCEDURE — ? COSMETIC CONSULTATION: FRACTIONAL RESURFACING

## 2024-08-27 PROCEDURE — ? COSMETIC CONSULTATION: FILLERS

## 2024-08-27 NOTE — PROCEDURE: DEFER
Instructions (Optional): Refer back ophthalmologist \\nAnd order ultrasound
Introduction Text (Please End With A Colon): The following procedure was deferred
X Size Of Lesion In Cm (Optional): 0
Detail Level: Detailed

## 2024-09-20 ENCOUNTER — APPOINTMENT (OUTPATIENT)
Dept: RADIOLOGY | Facility: IMAGING CENTER | Age: 74
End: 2024-09-20
Payer: MEDICARE

## 2024-09-20 ENCOUNTER — HOSPITAL ENCOUNTER (EMERGENCY)
Facility: MEDICAL CENTER | Age: 74
End: 2024-09-20
Attending: EMERGENCY MEDICINE
Payer: MEDICARE

## 2024-09-20 ENCOUNTER — OFFICE VISIT (OUTPATIENT)
Dept: URGENT CARE | Facility: CLINIC | Age: 74
End: 2024-09-20
Payer: MEDICARE

## 2024-09-20 ENCOUNTER — HOSPITAL ENCOUNTER (OUTPATIENT)
Dept: LAB | Facility: MEDICAL CENTER | Age: 74
End: 2024-09-20
Payer: MEDICARE

## 2024-09-20 ENCOUNTER — APPOINTMENT (OUTPATIENT)
Dept: RADIOLOGY | Facility: MEDICAL CENTER | Age: 74
End: 2024-09-20
Attending: EMERGENCY MEDICINE
Payer: MEDICARE

## 2024-09-20 VITALS
SYSTOLIC BLOOD PRESSURE: 114 MMHG | RESPIRATION RATE: 18 BRPM | HEART RATE: 106 BPM | TEMPERATURE: 96.5 F | BODY MASS INDEX: 26.46 KG/M2 | OXYGEN SATURATION: 93 % | DIASTOLIC BLOOD PRESSURE: 74 MMHG | HEIGHT: 64 IN | WEIGHT: 155 LBS

## 2024-09-20 VITALS
OXYGEN SATURATION: 95 % | TEMPERATURE: 97.8 F | HEIGHT: 64 IN | HEART RATE: 81 BPM | BODY MASS INDEX: 27.21 KG/M2 | SYSTOLIC BLOOD PRESSURE: 112 MMHG | WEIGHT: 159.39 LBS | RESPIRATION RATE: 16 BRPM | DIASTOLIC BLOOD PRESSURE: 68 MMHG

## 2024-09-20 DIAGNOSIS — J18.9 COMMUNITY ACQUIRED BILATERAL LOWER LOBE PNEUMONIA: ICD-10-CM

## 2024-09-20 DIAGNOSIS — D72.829 LEUKOCYTOSIS, UNSPECIFIED TYPE: ICD-10-CM

## 2024-09-20 DIAGNOSIS — R65.10 SIRS (SYSTEMIC INFLAMMATORY RESPONSE SYNDROME) (HCC): ICD-10-CM

## 2024-09-20 DIAGNOSIS — J45.31 MILD PERSISTENT ASTHMA WITH ACUTE EXACERBATION: ICD-10-CM

## 2024-09-20 DIAGNOSIS — U07.1 COVID-19: ICD-10-CM

## 2024-09-20 DIAGNOSIS — J18.9 PNEUMONIA OF BOTH LOWER LOBES DUE TO INFECTIOUS ORGANISM: ICD-10-CM

## 2024-09-20 DIAGNOSIS — R05.1 ACUTE COUGH: ICD-10-CM

## 2024-09-20 LAB
ALBUMIN SERPL BCP-MCNC: 3.5 G/DL (ref 3.2–4.9)
ALBUMIN/GLOB SERPL: 1.3 G/DL
ALP SERPL-CCNC: 79 U/L (ref 30–99)
ALT SERPL-CCNC: 28 U/L (ref 2–50)
ANION GAP SERPL CALC-SCNC: 10 MMOL/L (ref 7–16)
ANION GAP SERPL CALC-SCNC: 11 MMOL/L (ref 7–16)
AST SERPL-CCNC: 21 U/L (ref 12–45)
BASOPHILS # BLD AUTO: 0.1 % (ref 0–1.8)
BASOPHILS # BLD AUTO: 0.2 % (ref 0–1.8)
BASOPHILS # BLD: 0.02 K/UL (ref 0–0.12)
BASOPHILS # BLD: 0.04 K/UL (ref 0–0.12)
BILIRUB SERPL-MCNC: 0.5 MG/DL (ref 0.1–1.5)
BUN SERPL-MCNC: 10 MG/DL (ref 8–22)
BUN SERPL-MCNC: 9 MG/DL (ref 8–22)
CALCIUM ALBUM COR SERPL-MCNC: 8.5 MG/DL (ref 8.5–10.5)
CALCIUM SERPL-MCNC: 8.1 MG/DL (ref 8.4–10.2)
CALCIUM SERPL-MCNC: 8.7 MG/DL (ref 8.5–10.5)
CHLORIDE SERPL-SCNC: 102 MMOL/L (ref 96–112)
CHLORIDE SERPL-SCNC: 106 MMOL/L (ref 96–112)
CO2 SERPL-SCNC: 23 MMOL/L (ref 20–33)
CO2 SERPL-SCNC: 24 MMOL/L (ref 20–33)
CREAT SERPL-MCNC: 0.66 MG/DL (ref 0.5–1.4)
CREAT SERPL-MCNC: 0.79 MG/DL (ref 0.5–1.4)
EOSINOPHIL # BLD AUTO: 0.09 K/UL (ref 0–0.51)
EOSINOPHIL # BLD AUTO: 0.1 K/UL (ref 0–0.51)
EOSINOPHIL NFR BLD: 0.6 % (ref 0–6.9)
EOSINOPHIL NFR BLD: 0.7 % (ref 0–6.9)
ERYTHROCYTE [DISTWIDTH] IN BLOOD BY AUTOMATED COUNT: 38.3 FL (ref 35.9–50)
ERYTHROCYTE [DISTWIDTH] IN BLOOD BY AUTOMATED COUNT: 38.3 FL (ref 35.9–50)
FLUAV RNA SPEC QL NAA+PROBE: NEGATIVE
FLUAV RNA SPEC QL NAA+PROBE: NEGATIVE
FLUBV RNA SPEC QL NAA+PROBE: NEGATIVE
FLUBV RNA SPEC QL NAA+PROBE: NEGATIVE
GFR SERPLBLD CREATININE-BSD FMLA CKD-EPI: 78 ML/MIN/1.73 M 2
GFR SERPLBLD CREATININE-BSD FMLA CKD-EPI: 91 ML/MIN/1.73 M 2
GLOBULIN SER CALC-MCNC: 2.6 G/DL (ref 1.9–3.5)
GLUCOSE SERPL-MCNC: 108 MG/DL (ref 65–99)
GLUCOSE SERPL-MCNC: 109 MG/DL (ref 65–99)
HCT VFR BLD AUTO: 31.2 % (ref 37–47)
HCT VFR BLD AUTO: 31.7 % (ref 37–47)
HGB BLD-MCNC: 10.1 G/DL (ref 12–16)
HGB BLD-MCNC: 10.4 G/DL (ref 12–16)
IMM GRANULOCYTES # BLD AUTO: 0.17 K/UL (ref 0–0.11)
IMM GRANULOCYTES # BLD AUTO: 0.22 K/UL (ref 0–0.11)
IMM GRANULOCYTES NFR BLD AUTO: 1.3 % (ref 0–0.9)
IMM GRANULOCYTES NFR BLD AUTO: 1.3 % (ref 0–0.9)
LACTATE SERPL-SCNC: 1.4 MMOL/L (ref 0.5–2)
LYMPHOCYTES # BLD AUTO: 1.09 K/UL (ref 1–4.8)
LYMPHOCYTES # BLD AUTO: 1.37 K/UL (ref 1–4.8)
LYMPHOCYTES NFR BLD: 8 % (ref 22–41)
LYMPHOCYTES NFR BLD: 8.2 % (ref 22–41)
MCH RBC QN AUTO: 21.5 PG (ref 27–33)
MCH RBC QN AUTO: 21.5 PG (ref 27–33)
MCHC RBC AUTO-ENTMCNC: 32.4 G/DL (ref 32.2–35.5)
MCHC RBC AUTO-ENTMCNC: 32.8 G/DL (ref 32.2–35.5)
MCV RBC AUTO: 65.6 FL (ref 81.4–97.8)
MCV RBC AUTO: 66.4 FL (ref 81.4–97.8)
MONOCYTES # BLD AUTO: 0.49 K/UL (ref 0–0.85)
MONOCYTES # BLD AUTO: 0.66 K/UL (ref 0–0.85)
MONOCYTES NFR BLD AUTO: 3.6 % (ref 0–13.4)
MONOCYTES NFR BLD AUTO: 4 % (ref 0–13.4)
NEUTROPHILS # BLD AUTO: 11.7 K/UL (ref 1.82–7.42)
NEUTROPHILS # BLD AUTO: 14.28 K/UL (ref 1.82–7.42)
NEUTROPHILS NFR BLD: 85.7 % (ref 44–72)
NEUTROPHILS NFR BLD: 86.3 % (ref 44–72)
NRBC # BLD AUTO: 0.02 K/UL
NRBC # BLD AUTO: 0.03 K/UL
NRBC BLD-RTO: 0.1 /100 WBC (ref 0–0.2)
NRBC BLD-RTO: 0.2 /100 WBC (ref 0–0.2)
PLATELET # BLD AUTO: 306 K/UL (ref 164–446)
PLATELET # BLD AUTO: 316 K/UL (ref 164–446)
PMV BLD AUTO: 11.1 FL (ref 9–12.9)
PMV BLD AUTO: 11.6 FL (ref 9–12.9)
POTASSIUM SERPL-SCNC: 4.1 MMOL/L (ref 3.6–5.5)
POTASSIUM SERPL-SCNC: 4.5 MMOL/L (ref 3.6–5.5)
PROT SERPL-MCNC: 6.1 G/DL (ref 6–8.2)
RBC # BLD AUTO: 4.7 M/UL (ref 4.2–5.4)
RBC # BLD AUTO: 4.83 M/UL (ref 4.2–5.4)
RSV RNA SPEC QL NAA+PROBE: NEGATIVE
RSV RNA SPEC QL NAA+PROBE: NEGATIVE
SARS-COV-2 RNA RESP QL NAA+PROBE: DETECTED
SARS-COV-2 RNA RESP QL NAA+PROBE: POSITIVE
SODIUM SERPL-SCNC: 137 MMOL/L (ref 135–145)
SODIUM SERPL-SCNC: 139 MMOL/L (ref 135–145)
SPECIMEN SOURCE: ABNORMAL
WBC # BLD AUTO: 13.6 K/UL (ref 4.8–10.8)
WBC # BLD AUTO: 16.7 K/UL (ref 4.8–10.8)

## 2024-09-20 PROCEDURE — 85025 COMPLETE CBC W/AUTO DIFF WBC: CPT | Mod: 91

## 2024-09-20 PROCEDURE — 71045 X-RAY EXAM CHEST 1 VIEW: CPT

## 2024-09-20 PROCEDURE — 0241U POCT CEPHEID COV-2, FLU A/B, RSV - PCR: CPT

## 2024-09-20 PROCEDURE — 71046 X-RAY EXAM CHEST 2 VIEWS: CPT | Mod: TC

## 2024-09-20 PROCEDURE — 85025 COMPLETE CBC W/AUTO DIFF WBC: CPT

## 2024-09-20 PROCEDURE — 99284 EMERGENCY DEPT VISIT MOD MDM: CPT

## 2024-09-20 PROCEDURE — 3074F SYST BP LT 130 MM HG: CPT

## 2024-09-20 PROCEDURE — 80048 BASIC METABOLIC PNL TOTAL CA: CPT

## 2024-09-20 PROCEDURE — 99214 OFFICE O/P EST MOD 30 MIN: CPT

## 2024-09-20 PROCEDURE — 3078F DIAST BP <80 MM HG: CPT

## 2024-09-20 PROCEDURE — 80053 COMPREHEN METABOLIC PANEL: CPT

## 2024-09-20 PROCEDURE — 0241U HCHG SARS-COV-2 COVID-19 NFCT DS RESP RNA 4 TRGT MIC: CPT

## 2024-09-20 PROCEDURE — 36415 COLL VENOUS BLD VENIPUNCTURE: CPT

## 2024-09-20 PROCEDURE — 83605 ASSAY OF LACTIC ACID: CPT

## 2024-09-20 PROCEDURE — 700105 HCHG RX REV CODE 258: Performed by: EMERGENCY MEDICINE

## 2024-09-20 RX ORDER — DOXYCYCLINE 100 MG/1
100 CAPSULE ORAL 2 TIMES DAILY
Qty: 14 CAPSULE | Refills: 0 | Status: SHIPPED | OUTPATIENT
Start: 2024-09-20 | End: 2024-09-27

## 2024-09-20 RX ORDER — SODIUM CHLORIDE, SODIUM LACTATE, POTASSIUM CHLORIDE, CALCIUM CHLORIDE 600; 310; 30; 20 MG/100ML; MG/100ML; MG/100ML; MG/100ML
1000 INJECTION, SOLUTION INTRAVENOUS ONCE
Status: COMPLETED | OUTPATIENT
Start: 2024-09-20 | End: 2024-09-20

## 2024-09-20 RX ADMIN — SODIUM CHLORIDE, POTASSIUM CHLORIDE, SODIUM LACTATE AND CALCIUM CHLORIDE 1000 ML: 600; 310; 30; 20 INJECTION, SOLUTION INTRAVENOUS at 20:08

## 2024-09-20 ASSESSMENT — ENCOUNTER SYMPTOMS
RHINORRHEA: 1
WHEEZING: 1
SINUS PAIN: 1
SPUTUM PRODUCTION: 0
SHORTNESS OF BREATH: 1
NECK PAIN: 0
FEVER: 0
NAUSEA: 0
HEADACHES: 1
VOMITING: 0
COUGH: 1
SORE THROAT: 0
ABDOMINAL PAIN: 0
CHILLS: 1
HEMOPTYSIS: 0
DIARRHEA: 0

## 2024-09-20 ASSESSMENT — LIFESTYLE VARIABLES
HAVE YOU EVER FELT YOU SHOULD CUT DOWN ON YOUR DRINKING: NO
EVER FELT BAD OR GUILTY ABOUT YOUR DRINKING: NO
TOTAL SCORE: 0
TOTAL SCORE: 0
AVERAGE NUMBER OF DAYS PER WEEK YOU HAVE A DRINK CONTAINING ALCOHOL: 0
HAVE PEOPLE ANNOYED YOU BY CRITICIZING YOUR DRINKING: NO
EVER HAD A DRINK FIRST THING IN THE MORNING TO STEADY YOUR NERVES TO GET RID OF A HANGOVER: NO
DO YOU DRINK ALCOHOL: NO
ON A TYPICAL DAY WHEN YOU DRINK ALCOHOL HOW MANY DRINKS DO YOU HAVE: 0
DOES PATIENT WANT TO STOP DRINKING: NO
HOW MANY TIMES IN THE PAST YEAR HAVE YOU HAD 5 OR MORE DRINKS IN A DAY: 0
CONSUMPTION TOTAL: NEGATIVE
TOTAL SCORE: 0

## 2024-09-20 ASSESSMENT — FIBROSIS 4 INDEX
FIB4 SCORE: 1.32
FIB4 SCORE: 1.43

## 2024-09-20 NOTE — PROGRESS NOTES
Subjective:   Jacinda Patiño is a very pleasant 74 y.o. female who presents for:    Chief Complaint   Patient presents with    Congestion     Chest congestion , trouble breathing , asmtha flare up , just flew back from traveling        HPI:    URI   Episode onset: the patient reports that she recently returned from travel. She has been feeling chest congestion and dizziness for the past week. The problem has been gradually improving. Associated symptoms include coughing, headaches, rhinorrhea, sinus pain and wheezing. Pertinent negatives include no abdominal pain, chest pain, congestion, diarrhea, dysuria, ear pain, joint pain, joint swelling, nausea, neck pain, plugged ear sensation, sore throat or vomiting. Associated symptoms comments: Chills, sweats, lightheaded from sinus congestion, SOB with moderate physical activity  . Treatments tried: azithromycin and Medrol DosePack prescribed by her pulmonologist, Singulair, Brezbnair daily, albuterol inhaler TID over the last three days. The treatment provided significant relief.     PMH of moderate persistent asthma, which is being managed by pulmonology.     ROS:    Review of Systems   Constitutional:  Positive for chills and malaise/fatigue. Negative for fever.   HENT:  Positive for rhinorrhea and sinus pain. Negative for congestion, ear pain, sore throat and tinnitus.    Respiratory:  Positive for cough, shortness of breath and wheezing. Negative for hemoptysis and sputum production.    Cardiovascular:  Negative for chest pain.   Gastrointestinal:  Negative for abdominal pain, diarrhea, nausea and vomiting.   Genitourinary:  Negative for dysuria.   Musculoskeletal:  Negative for joint pain and neck pain.   Neurological:  Positive for headaches.       Medications:      Current Outpatient Medications   Medication Sig    Budeson-Glycopyrrol-Formoterol (VisualantZTRI AEROSPHERE) 160-9-4.8 MCG/ACT Aerosol Inhale 2 Puffs 2 times a day.    methylPREDNISolone (MEDROL  DOSEPAK) 4 MG Tablet Therapy Pack Take as directed.    azithromycin (ZITHROMAX) 250 MG Tab Take 2 tablets on day 1, then take 1 tablet a day for 4 days.    montelukast (SINGULAIR) 10 MG Tab Take 1 Tablet by mouth every day.    albuterol (PROAIR HFA) 108 (90 Base) MCG/ACT Aero Soln inhalation aerosol USE 2 INHALATIONS EVERY 6 HOURS AS NEEDED FOR SHORTNESS OF BREATH    Probiotic Product (PROBIOTIC-10 PO) Take  by mouth.    Xylitol (XYLIMELTS MT) Use  in the mouth or throat.    Epinastine HCl 0.05 % Solution Administer  into affected eye(s) as needed. PRN    Mouthwashes (BIOTENE/CALCIUM PBF) Liquid Use  in the mouth or throat. And spray    ipratropium-albuterol (DUONEB) 0.5-2.5 (3) MG/3ML nebulizer solution Take 3 mL by nebulization every 6 hours as needed for Shortness of Breath.    cyanocobalamin (VITAMIN B-12) 1000 MCG/ML Solution Inject 1,000 mcg into the shoulder, thigh, or buttocks. Twice a month    fluticasone (FLONASE SENSIMIST) 27.5 MCG/SPRAY nasal spray Administer 2 Sprays into affected nostril(S) every day.    omeprazole (PRILOSEC) 40 MG delayed-release capsule Take 1 Cap by mouth every day.    simvastatin (ZOCOR) 20 MG Tab TAKE 1 TABLET EVERY EVENING (Patient taking differently: Take 20 mg by mouth every evening. TAKE 1 TABLET EVERY EVENING)       Allergies:     No Known Allergies    Problem List:     Patient Active Problem List   Diagnosis    H/O breast surgery    HLD (hyperlipidemia)    Hormone replacement therapy (postmenopausal)    Thalassemia minor    GERD (gastroesophageal reflux disease)    Post-nasal drip    Mild intermittent asthma without complication    Dyspnea on exertion    Personal history of other malignant neoplasm of skin    Hiatal hernia    Breast ductal hyperplasia, atypical    Pulmonary hypertension (HCC)    Adhesive capsulitis of left shoulder       Surgical History:    Past Surgical History:   Procedure Laterality Date    PB MANIPJEROD ZAPIEN W ANESTHESIA Left 11/16/2021     Procedure: LEFT SHOULDER ARHTORSCOPY MANIPULATION UNDER ANESTHESIA;  Surgeon: Esvin Rosenthal M.D.;  Location: [facility];  Service: Orthopedics    PB SHLDR ARTHROSCOP,DIAGNOSTIC Left 11/16/2021    Procedure: LEFT SHOULDER LYSIS OF ADHESIONS;  Surgeon: Esvin Rosenthal M.D.;  Location: [facility];  Service: Orthopedics    CLAVICLE DISTAL EXCISION Left 11/16/2021    Procedure: LEFT DISTAL CLAVICLE RESECTION, REPAIRS AS INDICATED;  Surgeon: Esvin Rosenthal M.D.;  Location: [facility];  Service: Orthopedics    SHOULDER DECOMPRESSION Left 11/16/2021    Procedure: LEFT SUBACROMIAL DECOMPRESSION;  Surgeon: Esvin Rosenthal M.D.;  Location: [facility];  Service: Orthopedics    KNEE ARTHROSCOPY Right 01/01/2015    LUMPECTOMY Left 04/01/2012    BASAL CELL EXCISION  01/01/2010    Forehead     BLEPHAROPLASTY  01/01/2010    SHOULDER ARTHROSCOPY  01/01/1992    HERNIA REPAIR  01/01/1990    inguinal     LAPAROSCOPY  01/01/1984    AGA    BREAST BIOPSY      GA REMV 2ND CATARACT,CORN-SCLER SECTN         Past Social Hx:     Social History     Socioeconomic History    Marital status:    Occupational History    Occupation: Education      Comment: Retired   Tobacco Use    Smoking status: Never     Passive exposure: Past    Smokeless tobacco: Never    Tobacco comments:     2nd hand exposure/parents smoked    Vaping Use    Vaping status: Never Used   Substance and Sexual Activity    Alcohol use: No     Alcohol/week: 0.0 oz    Drug use: No        Past Family Hx:      Family History   Problem Relation Age of Onset    Heart Disease Mother     Hyperlipidemia Mother     Hypertension Mother     Stroke Mother     Other Father         Plane crash    No Known Problems Sister     Heart Attack Maternal Aunt     Heart Attack Maternal Grandmother        Problem list, medications, and allergies reviewed by myself today in Epic.     Objective:     Vitals:    09/20/24 1059   BP: 114/74    Pulse: (!) 106   Resp: 18   Temp: 35.8 °C (96.5 °F)   SpO2: 93%       Physical Exam  Vitals reviewed.   Constitutional:       General: She is not in acute distress.     Appearance: Normal appearance. She is not ill-appearing, toxic-appearing or diaphoretic.   HENT:      Head: Normocephalic and atraumatic.      Right Ear: Tympanic membrane, ear canal and external ear normal.      Left Ear: Tympanic membrane, ear canal and external ear normal.      Nose: Congestion present. No rhinorrhea.      Right Sinus: No maxillary sinus tenderness or frontal sinus tenderness.      Left Sinus: No maxillary sinus tenderness or frontal sinus tenderness.      Mouth/Throat:      Lips: Pink.      Mouth: Mucous membranes are moist.      Pharynx: Oropharynx is clear. No oropharyngeal exudate or posterior oropharyngeal erythema.   Eyes:      Extraocular Movements: Extraocular movements intact.      Conjunctiva/sclera: Conjunctivae normal.      Pupils: Pupils are equal, round, and reactive to light.   Cardiovascular:      Rate and Rhythm: Normal rate and regular rhythm.      Pulses: Normal pulses.      Heart sounds: Normal heart sounds.   Pulmonary:      Effort: Pulmonary effort is normal. No tachypnea, bradypnea or accessory muscle usage.      Breath sounds: No decreased air movement or transmitted upper airway sounds. Examination of the right-lower field reveals decreased breath sounds and rales. Examination of the left-lower field reveals decreased breath sounds and rales. Decreased breath sounds and rales present.   Abdominal:      General: Abdomen is flat.      Palpations: Abdomen is soft.   Musculoskeletal:         General: Normal range of motion.      Cervical back: Normal range of motion.   Skin:     General: Skin is warm and dry.   Neurological:      General: No focal deficit present.      Mental Status: She is alert and oriented to person, place, and time.   Psychiatric:         Mood and Affect: Mood normal.         Behavior:  Behavior normal.         Thought Content: Thought content normal.         X-ray images viewed and interpreted by APRN, confirmed by radiology:        RADIOLOGY RESULTS   DX-CHEST-2 VIEWS    Result Date: 9/20/2024 9/20/2024 11:21 AM HISTORY/REASON FOR EXAM:  Cough. Congestion TECHNIQUE/EXAM DESCRIPTION AND NUMBER OF VIEWS: Two views of the chest. COMPARISON:  8/11/2021 FINDINGS: The heart is normal in size. There are are infiltrates in the medial bases of both lungs consistent with pneumonia. No pleural effusions are appreciated.     Pneumonia within the bases of both lungs. Interval follow-up recommended.           Results for orders placed or performed in visit on 09/20/24   POCT CoV-2, Flu A/B, RSV by PCR   Result Value Ref Range    SARS-CoV-2 by PCR Positive (A) Negative, Invalid    Influenza virus A RNA Negative Negative, Invalid    Influenza virus B, PCR Negative Negative, Invalid    RSV, PCR Negative Negative, Invalid        Assessment/Plan:     Diagnosis and associated orders:     1. Community acquired bilateral lower lobe pneumonia  - DX-CHEST-2 VIEWS  - CBC With Differential; Future  - Basic Metabolic Panel (BMP); Future  - amoxicillin-clavulanate (AUGMENTIN) 875-125 MG Tab; Take 1 Tablet by mouth 2 times a day for 7 days.  Dispense: 14 Tablet; Refill: 0  - doxycycline (MONODOX) 100 MG capsule; Take 1 Capsule by mouth 2 times a day for 7 days.  Dispense: 14 Capsule; Refill: 0    2. Mild persistent asthma with acute exacerbation  - DX-CHEST-2 VIEWS    3. Acute cough  - POCT CoV-2, Flu A/B, RSV by PCR  - DX-CHEST-2 VIEWS    4. COVID-19        Comments/MDM:     The patient presents to the urgent care for evaluation for cold-like symptoms.  POCT testing positive for COVID-19.  The patient reports that she has been sick for the past 7 days after traveling to [place].  She took a prescription for Medrol Dosepak and azithromycin that was prescribed by her pulmonologist to have on hand when she travels.  She  reports that overall, her symptoms are improving.  A chest x-ray completed in clinic demonstrates bilateral lower lobe pneumonia.  I attempted to call the patient twice to go over the results from this chest x-ray, but was unsuccessful.  At this time, she will be started on dual antibiotic therapy with Augmentin and doxycycline.  Unfortunately, since her symptoms started greater than 5 days ago, she is not eligible for antiviral therapy with Paxlovid.  She was advised to continue all daily maintenance medications for her moderate persistent asthma.  She declined nebulizer treatment in clinic as she reports that she has these available at home.  Discussed supportive measures at length  Ensure close follow-up with her primary care provider within the next 72 hours to ensure that her symptoms are resolving  She was advised to follow-up with pulmonology within the next 2 to 4 weeks for repeat chest x-ray  Strict ER precautions are in place in the event that the patient develops any new or worsening symptoms, oxygen saturation below 92%, or persistent dizziness/lightheadedness, shortness of breath, fevers.         All questions answered. Patient verbalized understanding and is in agreement with this plan of care.     If symptoms are worsening or not improving in 3-5 days, follow-up with PCP or return to UC. Differential diagnosis, natural history, and supportive care discussed. AVS handout given and reviewed with patient. Patient educated on red flags and when to seek treatment back in ED or UC.     I personally reviewed prior external notes and test results pertinent to today's visit.  I have independently reviewed and interpreted all diagnostics ordered during this urgent care visit.     This dictation has been created using voice recognition software. The accuracy of the dictation is limited by the abilities of the software. I expect there may be some errors of grammar and possibly content. I made every attempt to  manually correct the errors within my dictation. However, errors related to voice recognition software may still exist and should be interpreted within the appropriate context.    This note was electronically signed by YOSSI Saleh

## 2024-09-20 NOTE — PROGRESS NOTES
Latest Reference Range & Units 09/20/24 14:16   WBC 4.8 - 10.8 K/uL 16.7 (H)   RBC 4.20 - 5.40 M/uL 4.83   Hemoglobin 12.0 - 16.0 g/dL 10.4 (L)   Hematocrit 37.0 - 47.0 % 31.7 (L)   MCV 81.4 - 97.8 fL 65.6 (L)   MCH 27.0 - 33.0 pg 21.5 (L)   MCHC 32.2 - 35.5 g/dL 32.8   RDW 35.9 - 50.0 fL 38.3   Platelet Count 164 - 446 K/uL 316   MPV 9.0 - 12.9 fL 11.6   Neutrophils-Polys 44.00 - 72.00 % 85.70 (H)   Neutrophils (Absolute) 1.82 - 7.42 K/uL 14.28 (H)   Lymphocytes 22.00 - 41.00 % 8.20 (L)   Lymphs (Absolute) 1.00 - 4.80 K/uL 1.37   Monocytes 0.00 - 13.40 % 4.00   Monos (Absolute) 0.00 - 0.85 K/uL 0.66   Eosinophils 0.00 - 6.90 % 0.60   Eos (Absolute) 0.00 - 0.51 K/uL 0.10   Basophils 0.00 - 1.80 % 0.20   Baso (Absolute) 0.00 - 0.12 K/uL 0.04   Immature Granulocytes 0.00 - 0.90 % 1.30 (H)   Immature Granulocytes (abs) 0.00 - 0.11 K/uL 0.22 (H)   Nucleated RBC 0.00 - 0.20 /100 WBC 0.10   NRBC (Absolute) K/uL 0.02   Sodium 135 - 145 mmol/L 139   Potassium 3.6 - 5.5 mmol/L 4.5   Chloride 96 - 112 mmol/L 106   Co2 20 - 33 mmol/L 23   Anion Gap 7.0 - 16.0  10.0   Glucose 65 - 99 mg/dL 108 (H)   Bun 8 - 22 mg/dL 10   Creatinine 0.50 - 1.40 mg/dL 0.79   GFR (CKD-EPI) >60 mL/min/1.73 m 2 78   Calcium 8.5 - 10.5 mg/dL 8.7   (H): Data is abnormally high  (L): Data is abnormally low    X-ray images viewed and interpreted by APRN, confirmed by radiology:        RADIOLOGY RESULTS   DX-CHEST-2 VIEWS    Result Date: 9/20/2024 9/20/2024 11:21 AM HISTORY/REASON FOR EXAM:  Cough. Congestion TECHNIQUE/EXAM DESCRIPTION AND NUMBER OF VIEWS: Two views of the chest. COMPARISON:  8/11/2021 FINDINGS: The heart is normal in size. There are are infiltrates in the medial bases of both lungs consistent with pneumonia. No pleural effusions are appreciated.     Pneumonia within the bases of both lungs. Interval follow-up recommended.       1. Community acquired bilateral lower lobe pneumonia    2. COVID-19    3. SIRS (systemic inflammatory  "response syndrome) (HCC)    4. Leukocytosis, unspecified type       Patient presented to the urgent care for evaluation of respiratory symptoms.  X-ray demonstrated bilateral pneumonia.  COVID-positive.  Initially, the patient was prescribed dual antibiotic therapy with Augmentin and doxycycline.  CBC and CMP were ordered.  Once her labs resulted, leukocytosis present - 16.7  She was tachycardic in the clinic at 106  Meets SIRS criteria, therefore I called the patient and engage in a comprehensive conversation with her regarding treatment options.  Due to her history significant for moderate persistent asthma, advised her to have close follow-up with her pulmonologist.  She called the pulmonology office today, and they will not be able to follow-up with her \"for months\".  She has taken 1 dose of each of these antibiotics.  She has also taken azithromycin within the last week.  While she is well-appearing and in no acute distress in clinic, I am concerned about possible decline without close outpatient follow-up.  At this time, I advised her to present to the emergency department for monitoring, serial labs.   "

## 2024-09-20 NOTE — PATIENT INSTRUCTIONS
"As we discussed your clinical condition would benefit from over-the-counter remedies:    Congestion:    Nasal rinsing with saline nasal spray or salt water (e.g., neti pot) can help relieve nasal dryness.    Breathe Right nasal strips at night for nasal congestion    Ponaris nasal emmollient for nasal congestion, dryness, and inflammation (do not use with iodine sensitivity)    Cool mist humidification, chest rubs, warm tea with honey, increased fluid intake to thin secretions    SALINE IRRIGATION/SPRAY 2 to 3 times per day    You may consider using a saline nasal irrigation (such as a \"Neti pot\" or similar device using sterile water, NOT tap water) or OTC saline nasal spray. Common brands include Unruly Med, Sinex, Sherman Nasal. May use short term nasal sprays, such as oxymetazoline (Afrin) to help relieve nasal discomfort, congestion, and/or pressure. Decongestant sprays should not be used longer than three consecutive days.     Over the counter medications:    OTC second generation antihistamine daily (cetirizine, desloratadine, fexofenadine, levocetirizine, and loratadine) daily IN COMBINATION WITH:    FLONASE (once per day) x 2 weeks     You may consider intranasal steroids such as fluticasone (brand name Flonase), (other options include Nasonex, Rhinocort, Nasacort) daily; continue for at least 2-3 weeks. Any generic should work as well but may cause slightly more nasal irritation. Please take according to package directions.  This steroid will help reduce inflammation of your sinuses.  This is the number one medication to help with seasonal allergies and treat nasal inflammation.  Cost: around $8 at [facility] for the generic fluticasone [facility] product (as of 5/20).    SUDAFED (low dose to see if tolerated) daily x 4-5 days    You may consider over-the-counter Sudafed (pseudoephedrine) to act as a decongestant to improve your breathing through your nose.  Please do not use this medication if you already have known " high blood pressure.  Please take according to package directions and note that this has a stimulating effect and should not be taken late in the day.  There is a low dose 12 hour formulation and a higher dose 24 hour formulation.  Start with a low dose to make sure you tolerate the medication.  Do not take late in the day as it may interfere with sleep.   Cost: Around $6 for the generic [facility] branded product at a 10mg dose (as of 2/2023).      SORE THROAT:    Symptom management for a sore throat includes:     Sipping cold or warm beverages (eg, tea with honey or lemon)     Eating cold or frozen desserts (eg, ice cream, popsicles)    Sucking on ice    Sucking on hard candy, CEPACOL lozenges, or medicated throat sprays. These contain the active ingredient benzocaine which is an anesthetic agent.  It helps relieve the symptoms of sore throat and may diminish your cough reflex.Please note that taking too frequently may cause harm - pay attention to package directions.    Gargling with warm salt water -  ¼ to ½ teaspoon of salt per 8 ounces (approximately 240 mL) of warm water.  Cool mist humidification  OTC Tylenol/ibuprofen PRN for pain/fever     PAIN OR FEVER:    NSAIDs  You may take Ibuprofen (brand name Motrin or Advil) 600 to 800 mg may be taken up to three times per day taken with food (do not exceed 2400 mg per day).  If you prefer you may consider Naproxen (brand name Naprosyn or Aleve) around 500 mg twice per day with food (do not exceed 1200 mg per day). Please do not take if you have known stomach ulcers or known kidney disease.     TYLENOL  You may take Tylenol according to package directions (1000 mg every 8 hours not to exceed 3000 mg per day).  Please do not consume with any alcohol products, or if you have known or suspected liver disease. These will help reduce inflammation, help with pain control, and can reduce fevers.    Community-Acquired Pneumonia, Adult  Pneumonia is a lung infection that  causes inflammation and the buildup of mucus and fluids in the lungs. This may cause coughing and difficulty breathing. Community-acquired pneumonia is pneumonia that develops in people who are not, and have not recently been, in a hospital or other health care facility.  Usually, pneumonia develops as a result of an illness that is caused by a virus, such as the common cold and the flu (influenza). It can also be caused by bacteria or fungi. While the common cold and influenza can pass from person to person (are contagious), pneumonia itself is not considered contagious.  What are the causes?  This condition may be caused by:  Viruses.  Bacteria.  Fungi.  What increases the risk?  The following factors may make you more likely to develop this condition:  Being over age 65 or having certain medical conditions, such as:  A long-term (chronic) disease, such as: chronic obstructive pulmonary disease (COPD), asthma, heart failure, diabetes, or kidney disease.  A condition that increases the risk of breathing in (aspirating) mucus and other fluids from your mouth and nose.  A weakened body defense system (immune system).  Having had your spleen removed (splenectomy). The spleen is the organ that helps fight germs and infections.  Not cleaning your teeth and gums well (poor dental hygiene).  Using tobacco products.  Traveling to places where germs that cause pneumonia are present or being near certain animals or animal habitats that could have germs that cause pneumonia.  What are the signs or symptoms?  Symptoms of this condition include:  A dry cough or a wet (productive) cough.  A fever, sweating, or chills.  Chest pain, especially when breathing deeply or coughing.  Fast breathing, difficulty breathing, or shortness of breath.  Tiredness (fatigue) and muscle aches.  How is this diagnosed?  This condition may be diagnosed based on your medical history or a physical exam. You may also have tests, including:  Imaging,  such as a chest X-ray or lung ultrasound.  Tests of:  The level of oxygen and other gases in your blood.  Mucus from your lungs (sputum).  Fluid around your lungs (pleural fluid).  Your urine.  How is this treated?  Treatment for this condition depends on many factors, such as the cause of your pneumonia, your medicines, and other medical conditions that you have.  For most adults, pneumonia may be treated at home. In some cases, treatment must happen in a hospital and may include:  Medicines that are given by mouth (orally) or through an IV, including:  Antibiotic medicines, if bacteria caused the pneumonia.  Medicines that kill viruses (antiviral medicines), if a virus caused the pneumonia.  Oxygen therapy.  Severe pneumonia, although rare, may require the following treatments:  Mechanical ventilation.This procedure uses a machine to help you breathe if you cannot breathe well on your own or maintain a safe level of blood oxygen.  Thoracentesis. This procedure removes any buildup of pleural fluid to help with breathing.  Follow these instructions at home:    Medicines  Take over-the-counter and prescription medicines only as told by your health care provider.  Take cough medicine only if you have trouble sleeping. Cough medicine can prevent your body from removing mucus from your lungs.  If you were prescribed antibiotics, take them as told by your health care provider. Do not stop taking the antibiotic even if you start to feel better.  Lifestyle         Do not drink alcohol.  Do not use any products that contain nicotine or tobacco. These products include cigarettes, chewing tobacco, and vaping devices, such as e-cigarettes. If you need help quitting, ask your health care provider.  Eat a healthy diet. This includes plenty of vegetables, fruits, whole grains, low-fat dairy products, and lean protein.  General instructions  Rest a lot and get at least 8 hours of sleep each night.  Sleep in a partly upright  position at night. Place a few pillows under your head or sleep in a reclining chair.  Return to your normal activities as told by your health care provider. Ask your health care provider what activities are safe for you.  Drink enough fluid to keep your urine pale yellow. This helps to thin the mucus in your lungs.  If your throat is sore, gargle with a mixture of salt and water 3-4 times a day or as needed. To make salt water, completely dissolve ½-1 tsp (3-6 g) of salt in 1 cup (237 mL) of warm water.  Keep all follow-up visits.  How is this prevented?  You can lower your risk of developing community-acquired pneumonia by:  Getting the pneumonia vaccine. There are different types and schedules of pneumonia vaccines. Ask your health care provider which option is best for you. Consider getting the pneumonia vaccine if:  You are older than 65 years of age.  You are 19-65 years of age and are receiving cancer treatment, have chronic lung disease, or have other medical conditions that affect your immune system. Ask your health care provider if this applies to you.  Getting your influenza vaccine every year. Ask your health care provider which type of vaccine is best for you.  Getting regular dental checkups.  Washing your hands often with soap and water for at least 20 seconds. If soap and water are not available, use hand .  Contact a health care provider if:  You have a fever.  You have trouble sleeping because you cannot control your cough with cough medicine.  Get help right away if:  Your shortness of breath becomes worse.  Your chest pain increases.  Your sickness becomes worse, especially if you are an older adult or have a weak immune system.  You cough up blood.  These symptoms may be an emergency. Get help right away. Call 911.  Do not wait to see if the symptoms will go away.  Do not drive yourself to the hospital.  Summary  Pneumonia is an infection of the lungs.  Community-acquired pneumonia  develops in people who have not been in the hospital. It can be caused by bacteria, viruses, or fungi.  This condition may be treated with antibiotics or antiviral medicines.  Severe pneumonia may require a hospital stay and treatment to help with breathing.  This information is not intended to replace advice given to you by your health care provider. Make sure you discuss any questions you have with your health care provider.  Document Revised: 02/15/2023 Document Reviewed: 02/15/2023  Elsevier Patient Education © 2023 [employer]

## 2024-09-21 NOTE — ED PROVIDER NOTES
"ER Provider Note    Scribed for Dr. Jac Dsouza M.D. by Charlene Higgins. 9/20/2024  7:36 PM    Primary Care Provider: Arcelia Walton M.D.    CHIEF COMPLAINT  Chief Complaint   Patient presents with    Cough     X4 days. Reports she was dx with PNA recently and started antibx today. Told to come to ER d/t elevated WBC. Reports T max 99.3.       EXTERNAL RECORDS REVIEWED  Outpatient Notes Patient was seen in urgent care earlier today and was diagnosed with bilateral pneumonia and sent to the ED for evaluation.     HPI/ROS    Jacinda Patiño is a 74 y.o. female who presents to the ED for a cough onset 4 days ago. The patient reports that she began to feel the start of her symptoms during [place]. She notes she had labored breathing and a persistent cough. She adds that when she was home her cough and labored breathing did not dissipate which prompted her to go to urgent care. She reports that urgent care diagnosed her with pneumonia and she began antibiotics. She adds that urgent care called her to inform her of her elevated white blood cell count and urged her to come into the ED for further evaluation. She currently confirms labored breathing that she describes \"feels like asthma.\" She denies any other symptoms at this time. She reports a history of asthma.       PAST MEDICAL HISTORY  Past Medical History:   Diagnosis Date    HLD (hyperlipidemia) 07/07/2015    Mild intermittent asthma without complication 07/19/2016    Thalassemia minor        SURGICAL HISTORY  Past Surgical History:   Procedure Laterality Date    PB MANIPULATJAYMIE MCCLURE JT W ANESTHESIA Left 11/16/2021    Procedure: LEFT SHOULDER ARHTORSCOPY MANIPULATION UNDER ANESTHESIA;  Surgeon: Esvin Rosenthal M.D.;  Location: [facility];  Service: Orthopedics    PB REN ARTHROSCOP,DIAGNOSTIC Left 11/16/2021    Procedure: LEFT SHOULDER LYSIS OF ADHESIONS;  Surgeon: Esvin Rosenthal M.D.;  Location: [facility];  Service: Orthopedics    CLAVICLE DISTAL EXCISION Left 11/16/2021    Procedure: LEFT DISTAL CLAVICLE RESECTION, REPAIRS AS INDICATED;  Surgeon: Esvin Rosenthal M.D.;  Location: [facility];  Service: Orthopedics    SHOULDER DECOMPRESSION Left 11/16/2021    Procedure: LEFT SUBACROMIAL DECOMPRESSION;  Surgeon: Esvin Rosenthal M.D.;  Location: [facility];  Service: Orthopedics    KNEE ARTHROSCOPY Right 01/01/2015    LUMPECTOMY Left 04/01/2012    BASAL CELL EXCISION  01/01/2010    Forehead     BLEPHAROPLASTY  01/01/2010    SHOULDER ARTHROSCOPY  01/01/1992    HERNIA REPAIR  01/01/1990    inguinal     LAPAROSCOPY  01/01/1984    AGA    BREAST BIOPSY      CT REMV 2ND CATARACT,CORN-SCLER SECTN         FAMILY HISTORY  Family History   Problem Relation Age of Onset    Heart Disease Mother     Hyperlipidemia Mother     Hypertension Mother     Stroke Mother     Other Father         Plane crash    No Known Problems Sister     Heart Attack Maternal Aunt     Heart Attack Maternal Grandmother        SOCIAL HISTORY   reports that she has never smoked. She has been exposed to tobacco smoke. She has never used smokeless tobacco. She reports that she does not drink alcohol and does not use drugs.    CURRENT MEDICATIONS  Previous Medications    ALBUTEROL (PROAIR HFA) 108 (90 BASE) MCG/ACT AERO SOLN INHALATION AEROSOL    USE 2 INHALATIONS EVERY 6 HOURS AS NEEDED FOR SHORTNESS OF BREATH    AMOXICILLIN-CLAVULANATE (AUGMENTIN) 875-125 MG TAB    Take 1 Tablet by mouth 2 times a day for 7 days.    BUDESON-GLYCOPYRROL-FORMOTEROL (BREZTRI AEROSPHERE) 160-9-4.8 MCG/ACT AEROSOL    Inhale 2 Puffs 2 times a day.    CYANOCOBALAMIN (VITAMIN B-12) 1000 MCG/ML SOLUTION    Inject 1,000 mcg into the shoulder, thigh, or buttocks. Twice a month    DOXYCYCLINE (MONODOX) 100 MG CAPSULE    Take 1 Capsule by mouth 2 times a day for 7 days.    EPINASTINE HCL 0.05 % SOLUTION    Administer  into affected eye(s) as needed. PRN  "   FLUTICASONE (FLONASE SENSIMIST) 27.5 MCG/SPRAY NASAL SPRAY    Administer 2 Sprays into affected nostril(S) every day.    IPRATROPIUM-ALBUTEROL (DUONEB) 0.5-2.5 (3) MG/3ML NEBULIZER SOLUTION    Take 3 mL by nebulization every 6 hours as needed for Shortness of Breath.    METHYLPREDNISOLONE (MEDROL DOSEPAK) 4 MG TABLET THERAPY PACK    Take as directed.    MONTELUKAST (SINGULAIR) 10 MG TAB    Take 1 Tablet by mouth every day.    MOUTHWASHES (BIOTENE/CALCIUM PBF) LIQUID    Use  in the mouth or throat. And spray    OMEPRAZOLE (PRILOSEC) 40 MG DELAYED-RELEASE CAPSULE    Take 1 Cap by mouth every day.    PROBIOTIC PRODUCT (PROBIOTIC-10 PO)    Take  by mouth.    SIMVASTATIN (ZOCOR) 20 MG TAB    TAKE 1 TABLET EVERY EVENING    XYLITOL (XYLIMELTS MT)    Use  in the mouth or throat.       ALLERGIES  Patient has no known allergies.    PHYSICAL EXAM  /78   Pulse (!) 105   Temp 36.5 °C (97.7 °F) (Temporal)   Resp 20   Ht 1.626 m (5' 4\")   Wt 72.3 kg (159 lb 6.3 oz)   SpO2 92%   BMI 27.36 kg/m²   Constitutional: Alert in no apparent distress.  HENT: No signs of trauma, Bilateral external ears normal, Nose normal.   Eyes: Pupils are equal and reactive, Conjunctiva normal, Non-icteric.   Neck: Normal range of motion, No tenderness, Supple,   Cardiovascular: Regular rate and rhythm, no murmurs.   Thorax & Lungs: Crackles bilaterally, no respiratory distress.  Abdomen: Bowel sounds normal, Soft, No tenderness,   Skin: Warm, Dry, No erythema, No rash.   Back: No bony tenderness, No CVA tenderness.   Extremities: No edema, No tenderness, No cyanosis, no tenderness, normal pulses  Neurologic: Normal  Psychiatric: Affect normal     DIAGNOSTIC STUDIES & PROCEDURES    Labs:   Results for orders placed or performed during the hospital encounter of 09/20/24   Lactic Acid   Result Value Ref Range    Lactic Acid 1.4 0.5 - 2.0 mmol/L   Complete Metabolic Panel   Result Value Ref Range    Sodium 137 135 - 145 mmol/L    Potassium " 4.1 3.6 - 5.5 mmol/L    Chloride 102 96 - 112 mmol/L    Co2 24 20 - 33 mmol/L    Anion Gap 11.0 7.0 - 16.0    Glucose 109 (H) 65 - 99 mg/dL    Bun 9 8 - 22 mg/dL    Creatinine 0.66 0.50 - 1.40 mg/dL    Calcium 8.1 (L) 8.4 - 10.2 mg/dL    Correct Calcium 8.5 8.5 - 10.5 mg/dL    AST(SGOT) 21 12 - 45 U/L    ALT(SGPT) 28 2 - 50 U/L    Alkaline Phosphatase 79 30 - 99 U/L    Total Bilirubin 0.5 0.1 - 1.5 mg/dL    Albumin 3.5 3.2 - 4.9 g/dL    Total Protein 6.1 6.0 - 8.2 g/dL    Globulin 2.6 1.9 - 3.5 g/dL    A-G Ratio 1.3 g/dL   CoV-2, Flu A/B, And RSV by PCR ([employer])    Specimen: Respirate   Result Value Ref Range    Influenza virus A RNA Negative Negative    Influenza virus B, PCR Negative Negative    RSV, PCR Negative Negative    SARS-CoV-2 by PCR DETECTED (AA)     SARS-CoV-2 Source NP Swab    ESTIMATED GFR   Result Value Ref Range    GFR (CKD-EPI) 91 >60 mL/min/1.73 m 2        Radiology:   The attending Emergency Physician has independently interpreted the diagnostic imaging associated with this visit and is awaiting the final reading from the radiologist, which will be displayed below.    Preliminary interpretation is a follows: Opacities in lung bases  Radiologist interpretation:      DX-CHEST-PORTABLE (1 VIEW)   Final Result      Increase markings are again noted at the lung bases.           COURSE & MEDICAL DECISION MAKING    ED Observation Status? No; Patient does not meet criteria for ED Observation.     INITIAL ASSESSMENT AND PLAN  Care Narrative:       7:36 PM - Patient seen and evaluated at bedside. Discussed plan of care, including labs and imaging. Patient agrees to plan of care. Ordered DX-Chest portable (1 view), CoV-2, Flue A/B and RSV by PCR, Lactic Acid, and CMP to evaluate.    8:36 PM - I reevaluated the patient at bedside. I discussed plan for discharge and follow up as outlined below. The patient is stable for discharge at this time and will return for any new or worsening symptoms. Patient  verbalizes understanding and support with my plan for discharge.      HYDRATION: Based on the patient's presentation of Hypotension the patient was given IV fluids. IV Hydration was used because oral hydration was not adequate alone. Upon recheck following hydration, the patient was improved.    ADDITIONAL PROBLEM LIST AND DISPOSITION  Patient with diagnosed COVID and opacities in lung bases.  Symptoms seem to have started mildly off over a week ago.  She is outside the window for Paxlovid.  Crackles are noted bilaterally.  She was started on antibiotics today.  Repeat x-ray does show the opacities in the lung bases.  She does have a leukocytosis that was noted earlier today.      Creatinine is normal.  Lactic acid is normal.  She is not septic.  She was given fluids.  Pressures are stable at this time.  Will discharge home with continued recommendation taking antibiotics as prescribed.             DISPOSITION AND DISCUSSIONS  I have discussed management of the patient with the following physicians and TINY's: None    Discussion of management with other Q or appropriate source(s): None     Escalation of care considered, and ultimately not performed: acute inpatient care management, however at this time, the patient is most appropriate for outpatient management.    Barriers to care at this time, including but not limited to:  None noted .     Decision tools and prescription drugs considered including, but not limited to: Antibiotics for pneumonia .    The patient will return for new or worsening symptoms and is stable at the time of discharge.      DISPOSITION:  Patient will be discharged home in stable condition.    FOLLOW UP:  Arcelia Walton M.D.  [address] [facility] [id number] [phone]    In 2 days      FINAL IMPRESSION   1. Pneumonia of both lower lobes due to infectious organism         I, Charlene Higgins (Scribe), am scribing for, and in the presence of, Jac Dsouza,  M.D..    Electronically signed by: Charlene Higgins (Scribe), 9/20/2024    IJac M.D. personally performed the services described in this documentation, as scribed by Charlene Higgins in my presence, and it is both accurate and complete.    The note accurately reflects work and decisions made by me.  Jac Dsouza M.D.  9/20/2024  10:37 PM

## 2024-09-21 NOTE — ED TRIAGE NOTES
Chief Complaint   Patient presents with    Cough     X4 days. Reports she was dx with PNA recently and started antibx today. Told to come to ER d/t elevated WBC. Reports T max 99.3.     Physical Exam  Pulmonary:      Effort: Pulmonary effort is normal.   Skin:     General: Skin is warm and dry.   Neurological:      Mental Status: She is alert.

## 2024-09-24 DIAGNOSIS — J45.20 MILD INTERMITTENT ASTHMA WITHOUT COMPLICATION: ICD-10-CM

## 2024-09-24 DIAGNOSIS — R05.9 COUGH: ICD-10-CM

## 2024-09-24 RX ORDER — IPRATROPIUM BROMIDE AND ALBUTEROL SULFATE 2.5; .5 MG/3ML; MG/3ML
3 SOLUTION RESPIRATORY (INHALATION) EVERY 6 HOURS PRN
Qty: 180 EACH | Refills: 4 | Status: SHIPPED | OUTPATIENT
Start: 2024-09-24

## 2024-09-25 ENCOUNTER — RX ONLY (OUTPATIENT)
Age: 74
Setting detail: RX ONLY
End: 2024-09-25

## 2024-09-25 RX ORDER — HYDROQUINONE 6% 6 G/100G
EMULSION TOPICAL
Qty: 1 | Refills: 3 | Status: ERX | COMMUNITY
Start: 2024-09-25

## 2024-09-26 ENCOUNTER — TELEPHONE (OUTPATIENT)
Dept: HEALTH INFORMATION MANAGEMENT | Facility: OTHER | Age: 74
End: 2024-09-26
Payer: MEDICARE

## 2024-10-31 ENCOUNTER — APPOINTMENT (RX ONLY)
Dept: URBAN - METROPOLITAN AREA CLINIC 36 | Facility: CLINIC | Age: 74
Setting detail: DERMATOLOGY
End: 2024-10-31

## 2024-10-31 DIAGNOSIS — L72.8 OTHER FOLLICULAR CYSTS OF THE SKIN AND SUBCUTANEOUS TISSUE: ICD-10-CM

## 2024-10-31 PROBLEM — D48.5 NEOPLASM OF UNCERTAIN BEHAVIOR OF SKIN: Status: ACTIVE | Noted: 2024-10-31

## 2024-10-31 PROCEDURE — ? ORDER ULTRASOUND

## 2024-10-31 ASSESSMENT — LOCATION DETAILED DESCRIPTION DERM: LOCATION DETAILED: LEFT CENTRAL MALAR CHEEK

## 2024-10-31 ASSESSMENT — LOCATION SIMPLE DESCRIPTION DERM: LOCATION SIMPLE: LEFT CHEEK

## 2024-10-31 ASSESSMENT — LOCATION ZONE DERM: LOCATION ZONE: FACE

## 2024-10-31 NOTE — PROCEDURE: ORDER ULTRASOUND
Detail Level: Simple
Lesion Location: left infraorbital
Neck Ultrasound Reason: Subcutaneous nodule with ill defined borders
Other Ultrasound Protocol Reason: L2-L4 right of midline evaluation for lipoma vs other mass
Priority: normal
Provider: Jenise Gay MD
Skin Lesion Ultrasound Reason: Enlarging firm papule
Subcutaneous Lesion Ultrasound Reason: 2cm firm fixed nodule
Ultrasound Protocol: Ultrasound of Skin Lesion
Other Ultrasound Protocol Name: lipoma

## 2024-11-02 ENCOUNTER — HOSPITAL ENCOUNTER (OUTPATIENT)
Dept: RADIOLOGY | Facility: MEDICAL CENTER | Age: 74
End: 2024-11-02
Attending: DERMATOLOGY
Payer: MEDICARE

## 2024-11-02 DIAGNOSIS — D48.5 NEOPLASM OF UNCERTAIN BEHAVIOR OF SKIN: ICD-10-CM

## 2024-11-02 PROCEDURE — 76536 US EXAM OF HEAD AND NECK: CPT

## 2024-11-06 PROBLEM — M17.11 OSTEOARTHRITIS OF RIGHT KNEE: Status: ACTIVE | Noted: 2024-11-06

## 2024-12-03 ENCOUNTER — OFFICE VISIT (OUTPATIENT)
Dept: SLEEP MEDICINE | Facility: MEDICAL CENTER | Age: 74
End: 2024-12-03
Attending: INTERNAL MEDICINE
Payer: MEDICARE

## 2024-12-03 VITALS
SYSTOLIC BLOOD PRESSURE: 140 MMHG | WEIGHT: 159 LBS | HEIGHT: 64 IN | BODY MASS INDEX: 27.14 KG/M2 | OXYGEN SATURATION: 97 % | DIASTOLIC BLOOD PRESSURE: 72 MMHG | HEART RATE: 76 BPM

## 2024-12-03 DIAGNOSIS — U07.1 COVID-19: ICD-10-CM

## 2024-12-03 DIAGNOSIS — Z01.818 ENCOUNTER FOR PREOPERATIVE ASSESSMENT: ICD-10-CM

## 2024-12-03 DIAGNOSIS — J45.20 MILD INTERMITTENT ASTHMA WITHOUT COMPLICATION: ICD-10-CM

## 2024-12-03 PROCEDURE — 3078F DIAST BP <80 MM HG: CPT | Performed by: INTERNAL MEDICINE

## 2024-12-03 PROCEDURE — 99214 OFFICE O/P EST MOD 30 MIN: CPT | Performed by: INTERNAL MEDICINE

## 2024-12-03 PROCEDURE — 99212 OFFICE O/P EST SF 10 MIN: CPT | Performed by: INTERNAL MEDICINE

## 2024-12-03 PROCEDURE — 3077F SYST BP >= 140 MM HG: CPT | Performed by: INTERNAL MEDICINE

## 2024-12-03 ASSESSMENT — ENCOUNTER SYMPTOMS
HEARTBURN: 0
BLURRED VISION: 0
STRIDOR: 0
DOUBLE VISION: 0
SPEECH CHANGE: 0
ORTHOPNEA: 0
COUGH: 1
EYE PAIN: 0
SPUTUM PRODUCTION: 0
NECK PAIN: 0
DIZZINESS: 0
FEVER: 0
FALLS: 0
SHORTNESS OF BREATH: 0
SORE THROAT: 0
CHILLS: 0
CLAUDICATION: 0
WEAKNESS: 0
BACK PAIN: 0
NAUSEA: 0
DIAPHORESIS: 0
HEADACHES: 0
DEPRESSION: 0
WEIGHT LOSS: 0
PHOTOPHOBIA: 0
ABDOMINAL PAIN: 0
HEMOPTYSIS: 0
FOCAL WEAKNESS: 0
DIARRHEA: 0
WHEEZING: 0
TREMORS: 0
CONSTIPATION: 0
EYE DISCHARGE: 0
VOMITING: 0
EYE REDNESS: 0
SINUS PAIN: 0
MYALGIAS: 0
PND: 0
PALPITATIONS: 0

## 2024-12-03 ASSESSMENT — FIBROSIS 4 INDEX: FIB4 SCORE: 0.97

## 2024-12-03 NOTE — PROGRESS NOTES
Chief Complaint   Patient presents with    Follow-Up     LAST SEEN 4/30/24    Results     CXR 9/20/24         HPI: This patient is a 74 y.o. female whom is followed in our clinic for chronic cough presumed RAD last seen by me on 4/30/24.  Pt has a long hx of asthma dating back to childhood.  She is a lifelong non-smoker.  She has been followed in our clinic for cough which began in June of 2020.  Symptoms were initially just a cough that seemed to improve with treatment for reactive airways disease for which she was on Breo 200, montelukast and prn STACIE. We did a trial of trelegy which was not more beneficial than Breo.  When cough persisted she had a w/u with GI and is also followed by ENT. She is treated for GERD and manometry study showed esophageal dysmotility but GERD was well controlled. 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 PFTs from 8/27/21 were wnls. CT chest from 8/28/21 showed no airway thickening, no inflammation or infiltrates, no LAD. She c/o severe dry mouth and serologic testing of sjogrens syndrome was neg in 2016 and again in fall of 2022.  We referred her for allergy testing and she saw Dr Faustin who tried her on breztri. She had allergies confirmed but her cough has been well controlled on Breztri one puff BID. She does have mild dysphonia on this. Spirometry last visit was normal. She took a trip to [place] in September which was c/w covid 19 for which she took paxlovid on 9/10 although was asx at the time. By 9/16 she began to develop chest congestion and returned home to [place] on 9/19. She was experiencing cough and sob and seen in UC on 9/20 and CXR was suggestive of b/l lower lobe infiltrates and she was started on abx. She was seen in ED for luekocytosis but no clinical need for admission. She has slowly improved since then and is planning to schedule knee surgery on 12/24/24 with [facility].     Past Medical History:    Diagnosis Date    HLD (hyperlipidemia) 07/07/2015    Mild intermittent asthma without complication 07/19/2016    Thalassemia minor        Social History     Socioeconomic History    Marital status:      Spouse name: Not on file    Number of children: Not on file    Years of education: Not on file    Highest education level: Not on file   Occupational History    Occupation: Education      Comment: Retired   Tobacco Use    Smoking status: Never     Passive exposure: Past    Smokeless tobacco: Never    Tobacco comments:     2nd hand exposure/parents smoked    Vaping Use    Vaping status: Never Used   Substance and Sexual Activity    Alcohol use: No     Alcohol/week: 0.0 oz    Drug use: No    Sexual activity: Not on file   Other Topics Concern    Not on file   Social History Narrative    Not on file     Social Drivers of Health     Financial Resource Strain: Not on file   Food Insecurity: Not on file   Transportation Needs: Not on file   Physical Activity: Not on file   Stress: Not on file   Social Connections: Not on file   Intimate Partner Violence: Not on file   Housing Stability: Not on file       Family History   Problem Relation Age of Onset    Heart Disease Mother     Hyperlipidemia Mother     Hypertension Mother     Stroke Mother     Other Father         Plane crash    No Known Problems Sister     Heart Attack Maternal Aunt     Heart Attack Maternal Grandmother        Current Outpatient Medications on File Prior to Visit   Medication Sig Dispense Refill    ipratropium-albuterol (DUONEB) 0.5-2.5 (3) MG/3ML nebulizer solution Take 3 mL by nebulization every 6 hours as needed for Shortness of Breath. 180 Each 4    Budeson-Glycopyrrol-Formoterol (BREZTRI AEROSPHERE) 160-9-4.8 MCG/ACT Aerosol Inhale 2 Puffs 2 times a day. 32.1 g 3    methylPREDNISolone (MEDROL DOSEPAK) 4 MG Tablet Therapy Pack Take as directed. 21 Tablet 0    montelukast (SINGULAIR) 10 MG Tab Take 1 Tablet by mouth every day. 90 Tablet 3     albuterol (PROAIR HFA) 108 (90 Base) MCG/ACT Aero Soln inhalation aerosol USE 2 INHALATIONS EVERY 6 HOURS AS NEEDED FOR SHORTNESS OF BREATH 3 Each 3    fluticasone (FLONASE SENSIMIST) 27.5 MCG/SPRAY nasal spray Administer 2 Sprays into affected nostril(S) every day.      omeprazole (PRILOSEC) 40 MG delayed-release capsule Take 1 Cap by mouth every day. 90 Cap 3    simvastatin (ZOCOR) 20 MG Tab TAKE 1 TABLET EVERY EVENING (Patient taking differently: Take 20 mg by mouth every evening. TAKE 1 TABLET EVERY EVENING) 90 Tab 3    estradiol (CLIMARA) 0.05 MG/24HR PATCH WEEKLY Place 1 Patch on the skin every 7 days.      Lifitegrast (XIIDRA) 5 % Solution Administer  into affected eye(s).      Propylene Glycol (VISTA MEIBO TEARS) 0.6 % Solution Administer  into affected eye(s).      Probiotic Product (PROBIOTIC-10 PO) Take  by mouth.      Xylitol (XYLIMELTS MT) Use  in the mouth or throat.      Epinastine HCl 0.05 % Solution Administer  into affected eye(s) as needed. PRN      Mouthwashes (BIOTENE/CALCIUM PBF) Liquid Use  in the mouth or throat. And spray      cyanocobalamin (VITAMIN B-12) 1000 MCG/ML Solution Inject 1,000 mcg into the shoulder, thigh, or buttocks. Twice a month       No current facility-administered medications on file prior to visit.       Patient has no known allergies.      ROS:   Review of Systems   Constitutional:  Negative for chills, diaphoresis, fever, malaise/fatigue and weight loss.   HENT:  Negative for congestion, ear discharge, ear pain, hearing loss, nosebleeds, sinus pain, sore throat and tinnitus.    Eyes:  Negative for blurred vision, double vision, photophobia, pain, discharge and redness.   Respiratory:  Positive for cough. Negative for hemoptysis, sputum production, shortness of breath, wheezing and stridor.    Cardiovascular:  Negative for chest pain, palpitations, orthopnea, claudication, leg swelling and PND.   Gastrointestinal:  Negative for abdominal pain, constipation, diarrhea,  "heartburn, nausea and vomiting.   Genitourinary:  Negative for dysuria and urgency.   Musculoskeletal:  Positive for joint pain. Negative for back pain, falls, myalgias and neck pain.   Skin:  Negative for itching and rash.   Neurological:  Negative for dizziness, tremors, speech change, focal weakness, weakness and headaches.   Endo/Heme/Allergies:  Negative for environmental allergies.   Psychiatric/Behavioral:  Negative for depression.        BP (!) 140/72 (BP Location: Right arm, Patient Position: Sitting, BP Cuff Size: Adult)   Pulse 76   Ht 1.626 m (5' 4\")   Wt 72.1 kg (159 lb)   SpO2 97%   Physical Exam  Constitutional:       General: She is not in acute distress.     Appearance: Normal appearance. She is well-developed and normal weight.   HENT:      Head: Normocephalic and atraumatic.      Right Ear: External ear normal.      Left Ear: External ear normal.      Nose: Nose normal. No congestion.      Mouth/Throat:      Mouth: Mucous membranes are moist.      Pharynx: Oropharynx is clear. No oropharyngeal exudate.   Eyes:      General: No scleral icterus.     Extraocular Movements: Extraocular movements intact.      Conjunctiva/sclera: Conjunctivae normal.      Pupils: Pupils are equal, round, and reactive to light.   Neck:      Vascular: No JVD.      Trachea: No tracheal deviation.   Cardiovascular:      Rate and Rhythm: Normal rate and regular rhythm.      Heart sounds: Normal heart sounds. No murmur heard.     No friction rub. No gallop.   Pulmonary:      Effort: Pulmonary effort is normal. No accessory muscle usage or respiratory distress.      Breath sounds: Normal breath sounds. No wheezing or rales.   Abdominal:      General: There is no distension.      Palpations: Abdomen is soft.      Tenderness: There is no abdominal tenderness.   Musculoskeletal:         General: No tenderness or deformity. Normal range of motion.      Cervical back: Normal range of motion and neck supple.      Right lower " leg: No edema.      Left lower leg: No edema.   Lymphadenopathy:      Cervical: No cervical adenopathy.   Skin:     General: Skin is warm and dry.      Findings: No rash.      Nails: There is no clubbing.   Neurological:      Mental Status: She is alert and oriented to person, place, and time.      Cranial Nerves: No cranial nerve deficit.      Gait: Gait normal.   Psychiatric:         Behavior: Behavior normal.         PFTs as reviewed by me personally: as per hpI    Imaging as reviewed by me personally:  as per hpi    Assessment:  1. Mild intermittent asthma without complication  PULMONARY FUNCTION TESTS -Test requested: Complete Pulmonary Function Test      2. COVID-19        3. Encounter for preoperative assessment            Plan:  Chronic, mild and well controlled on effectively low dose ICS. She did not need corticosteroids even with recent covid infx. Continue breztri dosed one puff BID. Update PFT.  Mild infx now resolved. Continue observation.   Pt is low risk for periooperative complications regarding upcoming knee surgery in December.   Return in about 6 months (around 6/3/2025) for PFT at time of f/u .

## 2024-12-03 NOTE — Clinical Note
Can you fax my note today to [facility] with attention Dr. Esvin Rosenthal for perioperative risk assessment? Thank you!

## 2024-12-11 ENCOUNTER — OFFICE VISIT (OUTPATIENT)
Dept: URGENT CARE | Facility: CLINIC | Age: 74
End: 2024-12-11
Payer: MEDICARE

## 2024-12-11 VITALS
SYSTOLIC BLOOD PRESSURE: 146 MMHG | DIASTOLIC BLOOD PRESSURE: 78 MMHG | WEIGHT: 159 LBS | RESPIRATION RATE: 16 BRPM | HEIGHT: 64 IN | OXYGEN SATURATION: 97 % | TEMPERATURE: 97.3 F | BODY MASS INDEX: 27.14 KG/M2 | HEART RATE: 99 BPM

## 2024-12-11 DIAGNOSIS — B34.9 NONSPECIFIC SYNDROME SUGGESTIVE OF VIRAL ILLNESS: ICD-10-CM

## 2024-12-11 LAB
FLUAV RNA SPEC QL NAA+PROBE: NEGATIVE
FLUBV RNA SPEC QL NAA+PROBE: NEGATIVE
RSV RNA SPEC QL NAA+PROBE: NEGATIVE
SARS-COV-2 RNA RESP QL NAA+PROBE: NEGATIVE

## 2024-12-11 PROCEDURE — 99213 OFFICE O/P EST LOW 20 MIN: CPT | Performed by: PHYSICIAN ASSISTANT

## 2024-12-11 PROCEDURE — 0241U POCT CEPHEID COV-2, FLU A/B, RSV - PCR: CPT | Mod: GZ | Performed by: PHYSICIAN ASSISTANT

## 2024-12-11 ASSESSMENT — FIBROSIS 4 INDEX: FIB4 SCORE: 0.97

## 2024-12-11 ASSESSMENT — ENCOUNTER SYMPTOMS
FEVER: 0
NAUSEA: 0
SORE THROAT: 0
CHILLS: 0
SHORTNESS OF BREATH: 0

## 2024-12-11 NOTE — RESULT ENCOUNTER NOTE
Ashok Monaco,    Great news- your testing is negative. Please rest up and continue to treat your symptoms. If you are not improving or you begin feeling worse please come back in to be reevaluated.    Kind regards,  Singh

## 2024-12-11 NOTE — PROGRESS NOTES
Subjective:   Jacinda Patioñ is a 74 y.o. female who presents for Congestion (Runny nose, yellow mucous x 1 day)        Patient presents with concerns of runny nose and nasal congestion that began today.  Denies fevers, chills, body aches, fatigue.  Patient has TKA scheduled in 2 weeks and wants to ensure she is not ill for the procedure.  She is resting and treating her symptoms.      Review of Systems   Constitutional:  Negative for chills and fever.   HENT:  Positive for congestion. Negative for sore throat.    Respiratory:  Negative for shortness of breath.    Gastrointestinal:  Negative for nausea.       PMH:  has a past medical history of HLD (hyperlipidemia) (07/07/2015), Mild intermittent asthma without complication (07/19/2016), and Thalassemia minor.  MEDS:   Current Outpatient Medications:     estradiol (CLIMARA) 0.05 MG/24HR PATCH WEEKLY, Place 1 Patch on the skin every 7 days., Disp: , Rfl:     Lifitegrast (XIIDRA) 5 % Solution, Administer  into affected eye(s)., Disp: , Rfl:     Propylene Glycol (VISTA MEIBO TEARS) 0.6 % Solution, Administer  into affected eye(s)., Disp: , Rfl:     ipratropium-albuterol (DUONEB) 0.5-2.5 (3) MG/3ML nebulizer solution, Take 3 mL by nebulization every 6 hours as needed for Shortness of Breath., Disp: 180 Each, Rfl: 4    Budeson-Glycopyrrol-Formoterol (BREZTRI AEROSPHERE) 160-9-4.8 MCG/ACT Aerosol, Inhale 2 Puffs 2 times a day., Disp: 32.1 g, Rfl: 3    methylPREDNISolone (MEDROL DOSEPAK) 4 MG Tablet Therapy Pack, Take as directed., Disp: 21 Tablet, Rfl: 0    montelukast (SINGULAIR) 10 MG Tab, Take 1 Tablet by mouth every day., Disp: 90 Tablet, Rfl: 3    albuterol (PROAIR HFA) 108 (90 Base) MCG/ACT Aero Soln inhalation aerosol, USE 2 INHALATIONS EVERY 6 HOURS AS NEEDED FOR SHORTNESS OF BREATH, Disp: 3 Each, Rfl: 3    Probiotic Product (PROBIOTIC-10 PO), Take  by mouth., Disp: , Rfl:     Xylitol (XYLIMELTS MT), Use  in the mouth or throat., Disp: , Rfl:     Epinastine  HCl 0.05 % Solution, Administer  into affected eye(s) as needed. PRN, Disp: , Rfl:     Mouthwashes (BIOTENE/CALCIUM PBF) Liquid, Use  in the mouth or throat. And spray, Disp: , Rfl:     cyanocobalamin (VITAMIN B-12) 1000 MCG/ML Solution, Inject 1,000 mcg into the shoulder, thigh, or buttocks. Twice a month, Disp: , Rfl:     fluticasone (FLONASE SENSIMIST) 27.5 MCG/SPRAY nasal spray, Administer 2 Sprays into affected nostril(S) every day., Disp: , Rfl:     omeprazole (PRILOSEC) 40 MG delayed-release capsule, Take 1 Cap by mouth every day., Disp: 90 Cap, Rfl: 3    simvastatin (ZOCOR) 20 MG Tab, TAKE 1 TABLET EVERY EVENING (Patient taking differently: Take 20 mg by mouth every evening. TAKE 1 TABLET EVERY EVENING), Disp: 90 Tab, Rfl: 3  ALLERGIES: No Known Allergies  SURGHX:   Past Surgical History:   Procedure Laterality Date    PB MANIPULATN REN JT W ANESTHESIA Left 11/16/2021    Procedure: LEFT SHOULDER ARHTORSCOPY MANIPULATION UNDER ANESTHESIA;  Surgeon: Esvin Rosenthal M.D.;  Location: [facility];  Service: Orthopedics    PB SHLDR ARTHROSCOP,DIAGNOSTIC Left 11/16/2021    Procedure: LEFT SHOULDER LYSIS OF ADHESIONS;  Surgeon: Esvin Rosenthal M.D.;  Location: [facility];  Service: Orthopedics    CLAVICLE DISTAL EXCISION Left 11/16/2021    Procedure: LEFT DISTAL CLAVICLE RESECTION, REPAIRS AS INDICATED;  Surgeon: Esvin Rosenthal M.D.;  Location: [facility];  Service: Orthopedics    SHOULDER DECOMPRESSION Left 11/16/2021    Procedure: LEFT SUBACROMIAL DECOMPRESSION;  Surgeon: Esvin Rosenthal M.D.;  Location: [facility];  Service: Orthopedics    KNEE ARTHROSCOPY Right 01/01/2015    LUMPECTOMY Left 04/01/2012    BASAL CELL EXCISION  01/01/2010    Forehead     BLEPHAROPLASTY  01/01/2010    SHOULDER ARTHROSCOPY  01/01/1992    HERNIA REPAIR  01/01/1990    inguinal     LAPAROSCOPY  01/01/1984    AGA    BREAST BIOPSY      FL REMV 2ND CATARACT,CORN-SCLER SECTN   "     SOCHX:  reports that she has never smoked. She has been exposed to tobacco smoke. She has never used smokeless tobacco. She reports that she does not drink alcohol and does not use drugs.  FH: Family history was reviewed, no pertinent findings to report   Objective:   BP (!) 146/78 (BP Location: Left arm, Patient Position: Sitting, BP Cuff Size: Adult)   Pulse 99   Temp 36.3 °C (97.3 °F) (Temporal)   Resp 16   Ht 1.626 m (5' 4\")   Wt 72.1 kg (159 lb)   SpO2 97%   BMI 27.29 kg/m²   Physical Exam  Vitals reviewed.   Constitutional:       General: She is not in acute distress.     Appearance: Normal appearance. She is well-developed. She is not toxic-appearing.   HENT:      Head: Normocephalic and atraumatic.      Right Ear: External ear normal.      Left Ear: External ear normal.      Nose: Congestion and rhinorrhea present. Rhinorrhea is clear.      Mouth/Throat:      Lips: Pink.      Mouth: Mucous membranes are moist.      Pharynx: Oropharynx is clear. Uvula midline.   Cardiovascular:      Rate and Rhythm: Normal rate and regular rhythm.      Heart sounds: Normal heart sounds, S1 normal and S2 normal.   Pulmonary:      Effort: Pulmonary effort is normal. No respiratory distress.      Breath sounds: Normal breath sounds. No stridor. No decreased breath sounds, wheezing, rhonchi or rales.   Skin:     General: Skin is dry.   Neurological:      Comments: Alert and oriented.    Psychiatric:         Speech: Speech normal.         Behavior: Behavior normal.           Results for orders placed or performed in visit on 12/11/24   POCT CoV-2, Flu A/B, RSV by PCR    Collection Time: 12/11/24 12:16 PM   Result Value Ref Range    SARS-CoV-2 by PCR Negative Negative, Invalid    Influenza virus A RNA Negative Negative, Invalid    Influenza virus B, PCR Negative Negative, Invalid    RSV, PCR Negative Negative, Invalid     *Note: Due to a large number of results and/or encounters for the requested time period, some " results have not been displayed. A complete set of results can be found in Results Review.       Assessment/Plan:   1. Nonspecific syndrome suggestive of viral illness  - POCT CoV-2, Flu A/B, RSV by PCR    Considerations include but not limited to viral URI, sinusitis, allergic rhinitis, influenza, COVID-19, group A strep.  Testing for COVID-19, influenza, RSV negative.  No evidence of lower respiratory involvement on exam today.  Sx likely viral in origin.    Recommend symptomatic care.  12-hour Mucinex or Mucinex D as needed for congestion.  May also perform nasal saline rinses 2-3 times a day and begin Flonase daily.  Recommend salt water gargles, lozenges, hot tea with honey, and ibuprofen as needed for sore throat.  Tylenol or ibuprofen as needed for fever control, body aches, and headaches.  Ensure adequate rest and hydration.    If symptoms fail to improve within 72 hours, new symptoms develop, symptoms worsen return to clinic or see PCP for reevaluation.    If patient experiences chest pain, pain with breathing, shortness of breath, difficulty speaking in full sentences, severe weakness or dizziness they should call 911 and go to the nearest emergency department for further evaluation.

## 2024-12-18 ENCOUNTER — HOSPITAL ENCOUNTER (OUTPATIENT)
Dept: LAB | Facility: MEDICAL CENTER | Age: 74
End: 2024-12-18
Attending: PHYSICIAN ASSISTANT
Payer: MEDICARE

## 2024-12-18 DIAGNOSIS — Z01.818 PRE-OP TESTING: ICD-10-CM

## 2024-12-18 LAB
BASOPHILS # BLD AUTO: 0.8 % (ref 0–1.8)
BASOPHILS # BLD: 0.05 K/UL (ref 0–0.12)
EOSINOPHIL # BLD AUTO: 0.13 K/UL (ref 0–0.51)
EOSINOPHIL NFR BLD: 2 % (ref 0–6.9)
ERYTHROCYTE [DISTWIDTH] IN BLOOD BY AUTOMATED COUNT: 36.9 FL (ref 35.9–50)
HCT VFR BLD AUTO: 34.9 % (ref 37–47)
HGB BLD-MCNC: 11.2 G/DL (ref 12–16)
IMM GRANULOCYTES # BLD AUTO: 0.02 K/UL (ref 0–0.11)
IMM GRANULOCYTES NFR BLD AUTO: 0.3 % (ref 0–0.9)
LYMPHOCYTES # BLD AUTO: 1.97 K/UL (ref 1–4.8)
LYMPHOCYTES NFR BLD: 30.5 % (ref 22–41)
MCH RBC QN AUTO: 21.4 PG (ref 27–33)
MCHC RBC AUTO-ENTMCNC: 32.1 G/DL (ref 32.2–35.5)
MCV RBC AUTO: 66.7 FL (ref 81.4–97.8)
MONOCYTES # BLD AUTO: 0.38 K/UL (ref 0–0.85)
MONOCYTES NFR BLD AUTO: 5.9 % (ref 0–13.4)
NEUTROPHILS # BLD AUTO: 3.9 K/UL (ref 1.82–7.42)
NEUTROPHILS NFR BLD: 60.5 % (ref 44–72)
NRBC # BLD AUTO: 0 K/UL
NRBC BLD-RTO: 0 /100 WBC (ref 0–0.2)
PLATELET # BLD AUTO: 367 K/UL (ref 164–446)
PMV BLD AUTO: 11.2 FL (ref 9–12.9)
RBC # BLD AUTO: 5.23 M/UL (ref 4.2–5.4)
WBC # BLD AUTO: 6.5 K/UL (ref 4.8–10.8)

## 2024-12-18 PROCEDURE — 36415 COLL VENOUS BLD VENIPUNCTURE: CPT

## 2024-12-18 PROCEDURE — 85025 COMPLETE CBC W/AUTO DIFF WBC: CPT

## 2025-03-25 ENCOUNTER — APPOINTMENT (OUTPATIENT)
Dept: URBAN - METROPOLITAN AREA CLINIC 6 | Facility: CLINIC | Age: 75
Setting detail: DERMATOLOGY
End: 2025-03-25

## 2025-03-25 DIAGNOSIS — L66.12 FRONTAL FIBROSING ALOPECIA: ICD-10-CM | Status: INADEQUATELY CONTROLLED

## 2025-03-25 PROBLEM — L65.9 NONSCARRING HAIR LOSS, UNSPECIFIED: Status: ACTIVE | Noted: 2025-03-25

## 2025-03-25 PROCEDURE — ? PRESCRIPTION MEDICATION MANAGEMENT

## 2025-03-25 PROCEDURE — ? PRESCRIPTION

## 2025-03-25 PROCEDURE — ? COUNSELING

## 2025-03-25 PROCEDURE — ? DIAGNOSIS COMMENT

## 2025-03-25 PROCEDURE — 99213 OFFICE O/P EST LOW 20 MIN: CPT

## 2025-03-25 RX ORDER — MINOXIDIL 2.5 MG/1
1 TABLET ORAL QD
Qty: 90 | Refills: 1 | Status: ERX | COMMUNITY
Start: 2025-03-25

## 2025-03-25 RX ORDER — KETOCONAZOLE 20 MG/ML
1 SHAMPOO, SUSPENSION TOPICAL QD
Qty: 360 | Refills: 6 | Status: ERX | COMMUNITY
Start: 2025-03-25

## 2025-03-25 RX ORDER — CLOBETASOL PROPIONATE 0.5 MG/ML
1 SOLUTION TOPICAL BID
Qty: 150 | Refills: 3 | Status: ERX | COMMUNITY
Start: 2025-03-25

## 2025-03-25 RX ADMIN — CLOBETASOL PROPIONATE 1: 0.5 SOLUTION TOPICAL at 00:00

## 2025-03-25 RX ADMIN — MINOXIDIL 1: 2.5 TABLET ORAL at 00:00

## 2025-03-25 RX ADMIN — KETOCONAZOLE 1: 20 SHAMPOO, SUSPENSION TOPICAL at 00:00

## 2025-03-25 ASSESSMENT — LOCATION ZONE DERM: LOCATION ZONE: SCALP

## 2025-03-25 ASSESSMENT — LOCATION SIMPLE DESCRIPTION DERM: LOCATION SIMPLE: ANTERIOR SCALP

## 2025-03-25 ASSESSMENT — LOCATION DETAILED DESCRIPTION DERM: LOCATION DETAILED: MID-FRONTAL SCALP

## 2025-03-25 ASSESSMENT — SEVERITY ASSESSMENT OVERALL AMONG ALL PATIENTS
IN YOUR EXPERIENCE, AMONG ALL PATIENTS YOU HAVE SEEN WITH THIS CONDITION, HOW SEVERE IS THIS PATIENT'S CONDITION?: S1 (LESS THAN 25% HAIR LOSS)

## 2025-03-25 NOTE — PROCEDURE: DIAGNOSIS COMMENT
Comment: Erythema and scaling primarily along frontal scalp/hairline > remainder of scalp. No evidence of FFA today, but concern for early change? Will treat with topical steroids, close f/u - consider bx vs starting ILK if noting progression
Detail Level: Simple
Render Risk Assessment In Note?: no

## 2025-03-25 NOTE — PROCEDURE: PRESCRIPTION MEDICATION MANAGEMENT
Render In Strict Bullet Format?: No
Detail Level: Zone
Initiate Treatment: Clobetasol 0.05 % scalp solution Bid\\nKetoconazole 2 % shampoo Qd\\nMinoxidil 2.5 mg tablet Qd

## 2025-03-25 NOTE — HPI: RASH
What Type Of Note Output Would You Prefer (Optional)?: Standard Output
Is The Patient Presenting As Previously Scheduled?: No, they are coming in before their scheduled appointment
How Severe Is Your Rash?: mild
Is This A New Presentation, Or A Follow-Up?: Rash
- - -

## 2025-03-29 ENCOUNTER — APPOINTMENT (OUTPATIENT)
Dept: URGENT CARE | Facility: CLINIC | Age: 75
End: 2025-03-29
Payer: MEDICARE

## 2025-04-08 ENCOUNTER — HOSPITAL ENCOUNTER (OUTPATIENT)
Dept: RADIOLOGY | Facility: MEDICAL CENTER | Age: 75
End: 2025-04-08
Attending: FAMILY MEDICINE
Payer: MEDICARE

## 2025-04-08 DIAGNOSIS — Z12.31 ENCOUNTER FOR MAMMOGRAM TO ESTABLISH BASELINE MAMMOGRAM: ICD-10-CM

## 2025-04-08 PROCEDURE — 77067 SCR MAMMO BI INCL CAD: CPT

## 2025-04-14 ENCOUNTER — HOSPITAL ENCOUNTER (OUTPATIENT)
Dept: LAB | Facility: MEDICAL CENTER | Age: 75
End: 2025-04-14
Attending: FAMILY MEDICINE
Payer: MEDICARE

## 2025-04-14 LAB
ALBUMIN SERPL BCP-MCNC: 4 G/DL (ref 3.2–4.9)
ALBUMIN/GLOB SERPL: 1.6 G/DL
ALP SERPL-CCNC: 57 U/L (ref 30–99)
ALT SERPL-CCNC: 17 U/L (ref 2–50)
ANION GAP SERPL CALC-SCNC: 11 MMOL/L (ref 7–16)
AST SERPL-CCNC: 22 U/L (ref 12–45)
BASOPHILS # BLD AUTO: 0.9 % (ref 0–1.8)
BASOPHILS # BLD: 0.04 K/UL (ref 0–0.12)
BILIRUB SERPL-MCNC: 0.5 MG/DL (ref 0.1–1.5)
BUN SERPL-MCNC: 14 MG/DL (ref 8–22)
CALCIUM ALBUM COR SERPL-MCNC: 9 MG/DL (ref 8.5–10.5)
CALCIUM SERPL-MCNC: 9 MG/DL (ref 8.5–10.5)
CHLORIDE SERPL-SCNC: 108 MMOL/L (ref 96–112)
CHOLEST SERPL-MCNC: 270 MG/DL (ref 100–199)
CO2 SERPL-SCNC: 25 MMOL/L (ref 20–33)
CREAT SERPL-MCNC: 0.83 MG/DL (ref 0.5–1.4)
EOSINOPHIL # BLD AUTO: 0.06 K/UL (ref 0–0.51)
EOSINOPHIL NFR BLD: 1.3 % (ref 0–6.9)
ERYTHROCYTE [DISTWIDTH] IN BLOOD BY AUTOMATED COUNT: 40.9 FL (ref 35.9–50)
EST. AVERAGE GLUCOSE BLD GHB EST-MCNC: 126 MG/DL
FASTING STATUS PATIENT QL REPORTED: NORMAL
GFR SERPLBLD CREATININE-BSD FMLA CKD-EPI: 73 ML/MIN/1.73 M 2
GLOBULIN SER CALC-MCNC: 2.5 G/DL (ref 1.9–3.5)
GLUCOSE SERPL-MCNC: 88 MG/DL (ref 65–99)
HBA1C MFR BLD: 6 % (ref 4–5.6)
HCT VFR BLD AUTO: 33.2 % (ref 37–47)
HDLC SERPL-MCNC: 85 MG/DL
HGB BLD-MCNC: 10.4 G/DL (ref 12–16)
IMM GRANULOCYTES # BLD AUTO: 0.02 K/UL (ref 0–0.11)
IMM GRANULOCYTES NFR BLD AUTO: 0.4 % (ref 0–0.9)
LDLC SERPL CALC-MCNC: 169 MG/DL
LYMPHOCYTES # BLD AUTO: 1.41 K/UL (ref 1–4.8)
LYMPHOCYTES NFR BLD: 30.7 % (ref 22–41)
MCH RBC QN AUTO: 20.6 PG (ref 27–33)
MCHC RBC AUTO-ENTMCNC: 31.3 G/DL (ref 32.2–35.5)
MCV RBC AUTO: 65.6 FL (ref 81.4–97.8)
MONOCYTES # BLD AUTO: 0.28 K/UL (ref 0–0.85)
MONOCYTES NFR BLD AUTO: 6.1 % (ref 0–13.4)
NEUTROPHILS # BLD AUTO: 2.79 K/UL (ref 1.82–7.42)
NEUTROPHILS NFR BLD: 60.6 % (ref 44–72)
NRBC # BLD AUTO: 0 K/UL
NRBC BLD-RTO: 0 /100 WBC (ref 0–0.2)
PLATELET # BLD AUTO: 339 K/UL (ref 164–446)
PMV BLD AUTO: 10.5 FL (ref 9–12.9)
POTASSIUM SERPL-SCNC: 4.5 MMOL/L (ref 3.6–5.5)
PROT SERPL-MCNC: 6.5 G/DL (ref 6–8.2)
RBC # BLD AUTO: 5.06 M/UL (ref 4.2–5.4)
SODIUM SERPL-SCNC: 144 MMOL/L (ref 135–145)
TRIGL SERPL-MCNC: 82 MG/DL (ref 0–149)
VIT B12 SERPL-MCNC: 694 PG/ML (ref 211–911)
WBC # BLD AUTO: 4.6 K/UL (ref 4.8–10.8)

## 2025-04-14 PROCEDURE — 85025 COMPLETE CBC W/AUTO DIFF WBC: CPT

## 2025-04-14 PROCEDURE — 36415 COLL VENOUS BLD VENIPUNCTURE: CPT

## 2025-04-14 PROCEDURE — 82043 UR ALBUMIN QUANTITATIVE: CPT

## 2025-04-14 PROCEDURE — 83036 HEMOGLOBIN GLYCOSYLATED A1C: CPT | Mod: GA

## 2025-04-14 PROCEDURE — 80053 COMPREHEN METABOLIC PANEL: CPT

## 2025-04-14 PROCEDURE — 82570 ASSAY OF URINE CREATININE: CPT

## 2025-04-14 PROCEDURE — 82607 VITAMIN B-12: CPT | Mod: GA

## 2025-04-14 PROCEDURE — 80061 LIPID PANEL: CPT

## 2025-04-15 LAB
COLLECT DURATION TIME SPEC: NORMAL HR
CREAT 24H UR-MCNC: 147 MG/DL
MICROALBUMIN 24H UR-MCNC: 0.4 MG/DL
MICROALBUMIN/CREAT 24H UR: 3 MG/G (ref 0–30)
SPECIMEN VOL ?TM UR: NORMAL ML

## 2025-04-17 DIAGNOSIS — J45.20 MILD INTERMITTENT ASTHMA WITHOUT COMPLICATION: ICD-10-CM

## 2025-04-17 DIAGNOSIS — R05.9 COUGH: ICD-10-CM

## 2025-04-17 NOTE — TELEPHONE ENCOUNTER
Have we ever prescribed this med? Yes.  If yes, what date? 9/24/24    Last OV: 12/3/24    Next OV: 6/9/25    Medications: Duoneb

## 2025-04-18 RX ORDER — IPRATROPIUM BROMIDE AND ALBUTEROL SULFATE 2.5; .5 MG/3ML; MG/3ML
3 SOLUTION RESPIRATORY (INHALATION) EVERY 6 HOURS PRN
Qty: 180 EACH | Refills: 4 | Status: SHIPPED | OUTPATIENT
Start: 2025-04-18

## 2025-04-29 ENCOUNTER — NON-PROVIDER VISIT (OUTPATIENT)
Dept: SLEEP MEDICINE | Facility: MEDICAL CENTER | Age: 75
End: 2025-04-29
Attending: INTERNAL MEDICINE
Payer: MEDICARE

## 2025-04-29 VITALS — HEIGHT: 65 IN | WEIGHT: 152 LBS | BODY MASS INDEX: 25.33 KG/M2

## 2025-04-29 DIAGNOSIS — J45.20 MILD INTERMITTENT ASTHMA WITHOUT COMPLICATION: ICD-10-CM

## 2025-04-29 PROCEDURE — 94060 EVALUATION OF WHEEZING: CPT | Performed by: INTERNAL MEDICINE

## 2025-04-29 PROCEDURE — 94729 DIFFUSING CAPACITY: CPT | Performed by: INTERNAL MEDICINE

## 2025-04-29 PROCEDURE — 94726 PLETHYSMOGRAPHY LUNG VOLUMES: CPT | Performed by: INTERNAL MEDICINE

## 2025-04-29 ASSESSMENT — FIBROSIS 4 INDEX: FIB4 SCORE: 1.2

## 2025-04-29 NOTE — PROCEDURES
Technician: COLLEEN Santiago    Technician Comment:  Good patient effort & cooperation.  The results of this test meet the ATS/ERS standards for acceptability & reproducibility.  Test was performed on the [employer] Plethysmograph-Elite DX system.  Predicted values were GLI-2012 for spirometry, GLI-2020 for DLCO, ITS for Lung Volumes.  The DLCO was uncorrected for Hgb.  A bronchodilator of Albuterol HFA -2puffs via spacer administered.  DLCO performed during dilation period.    Interpretation:  FVC 3.00 L, Z-score 0.60  FEV1 2.50 L, Z-score 1.23  FEV1/FVC 83%, Z-score 0.78  TLC 5.12 L, Z-score -0.02  RV 2.23 L, Z-score -0.27  DLCO 22.35 mL/min/mmHg, Z-score 1.02  Flow-volume loop: Normal    Spirometry is normal and shows no airflow obstruction.  No bronchodilator response is observed which does not preclude a clinical response to bronchodilator therapy.  Lung volumes are normal.  Gas exchange is normal.

## 2025-06-09 ENCOUNTER — OFFICE VISIT (OUTPATIENT)
Dept: SLEEP MEDICINE | Facility: MEDICAL CENTER | Age: 75
End: 2025-06-09
Attending: INTERNAL MEDICINE
Payer: MEDICARE

## 2025-06-09 VITALS
HEART RATE: 84 BPM | SYSTOLIC BLOOD PRESSURE: 110 MMHG | OXYGEN SATURATION: 97 % | WEIGHT: 167 LBS | DIASTOLIC BLOOD PRESSURE: 60 MMHG | BODY MASS INDEX: 28.51 KG/M2 | HEIGHT: 64 IN

## 2025-06-09 DIAGNOSIS — J45.20 MILD INTERMITTENT ASTHMA WITHOUT COMPLICATION: ICD-10-CM

## 2025-06-09 DIAGNOSIS — J44.9 OBSTRUCTIVE LUNG DISEASE (GENERALIZED) (HCC): ICD-10-CM

## 2025-06-09 PROCEDURE — 3074F SYST BP LT 130 MM HG: CPT | Performed by: INTERNAL MEDICINE

## 2025-06-09 PROCEDURE — 3078F DIAST BP <80 MM HG: CPT | Performed by: INTERNAL MEDICINE

## 2025-06-09 PROCEDURE — 99212 OFFICE O/P EST SF 10 MIN: CPT | Performed by: INTERNAL MEDICINE

## 2025-06-09 PROCEDURE — 99214 OFFICE O/P EST MOD 30 MIN: CPT | Performed by: INTERNAL MEDICINE

## 2025-06-09 RX ORDER — ALBUTEROL SULFATE 90 UG/1
INHALANT RESPIRATORY (INHALATION)
Qty: 3 EACH | Refills: 3 | Status: SHIPPED | OUTPATIENT
Start: 2025-06-09

## 2025-06-09 RX ORDER — ROSUVASTATIN CALCIUM 10 MG/1
TABLET, COATED ORAL
COMMUNITY
Start: 2025-04-21

## 2025-06-09 RX ORDER — METHYLPREDNISOLONE 4 MG/1
TABLET ORAL
Qty: 21 TABLET | Refills: 1 | Status: SHIPPED
Start: 2025-06-09 | End: 2025-06-09

## 2025-06-09 RX ORDER — AZITHROMYCIN 250 MG/1
TABLET, FILM COATED ORAL
Qty: 6 TABLET | Refills: 2 | Status: SHIPPED | OUTPATIENT
Start: 2025-06-09

## 2025-06-09 RX ORDER — AZITHROMYCIN 250 MG/1
TABLET, FILM COATED ORAL
Qty: 6 TABLET | Refills: 1 | Status: SHIPPED
Start: 2025-06-09 | End: 2025-06-09

## 2025-06-09 RX ORDER — METHYLPREDNISOLONE 4 MG/1
TABLET ORAL
Qty: 21 TABLET | Refills: 2 | Status: SHIPPED | OUTPATIENT
Start: 2025-06-09

## 2025-06-09 ASSESSMENT — ENCOUNTER SYMPTOMS
ORTHOPNEA: 0
SORE THROAT: 0
PND: 0
WEIGHT LOSS: 0
TREMORS: 0
DEPRESSION: 0
FOCAL WEAKNESS: 0
NAUSEA: 0
BACK PAIN: 0
DIZZINESS: 0
EYE PAIN: 0
NECK PAIN: 0
CONSTIPATION: 0
COUGH: 0
ABDOMINAL PAIN: 0
FEVER: 0
CHILLS: 0
DIAPHORESIS: 0
EYE DISCHARGE: 0
DOUBLE VISION: 0
STRIDOR: 0
HEADACHES: 0
WEAKNESS: 0
SPEECH CHANGE: 0
SINUS PAIN: 0
PALPITATIONS: 0
BLURRED VISION: 0
HEARTBURN: 0
DIARRHEA: 0
CLAUDICATION: 0
SPUTUM PRODUCTION: 0
HEMOPTYSIS: 0
PHOTOPHOBIA: 0
VOMITING: 0
WHEEZING: 0
FALLS: 0
EYE REDNESS: 0
MYALGIAS: 0
SHORTNESS OF BREATH: 0

## 2025-06-09 ASSESSMENT — FIBROSIS 4 INDEX: FIB4 SCORE: 1.2

## 2025-06-09 NOTE — PROGRESS NOTES
Chief Complaint   Patient presents with    Follow-Up     LAST SEEN 12/3/24    Results     PFT 4/29/25         HPI: This patient is a 75 y.o. female whom is followed in our clinic for asthma last seen by me on 12/3/24. Pt has a long hx of asthma dating back to childhood.  She is a lifelong non-smoker.  She has been followed in our clinic for cough which began in June of 2020.  Symptoms were initially just a cough that seemed to improve with treatment for reactive airways disease for which she was on Breo 200, montelukast and prn STACIE. We did a trial of trelegy which was not more beneficial than Breo.  When cough persisted she had a w/u with GI and is also followed by ENT. She is treated for GERD and manometry study showed esophageal dysmotility but GERD was well controlled. 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 PFTs from 8/27/21 were wnls. CT chest from 8/28/21 showed no airway thickening, no inflammation or infiltrates, no LAD. She c/o severe dry mouth and serologic testing of sjogrens syndrome was neg in 2016 and again in fall of 2022.  We referred her for allergy testing and she saw Dr Faustin who tried her on breztri. She had allergies confirmed but her cough has been well controlled on Breztri one puff BID. She travels frequently and was tx with abx ( no prednsione) last fall however she required abx again with oral steriods in April for URI (started in her sinuses). She feels back to baseline today. She remains on breztri one puff BID. PFT from April was essentially normal. She has three trips coming up beginning in August and would like emergency abx and prednisone.    Past Medical History[1]    Social History     Socioeconomic History    Marital status:      Spouse name: Not on file    Number of children: Not on file    Years of education: Not on file    Highest education level: Not on file   Occupational History     Occupation: Education      Comment: Retired   Tobacco Use    Smoking status: Never     Passive exposure: Past    Smokeless tobacco: Never    Tobacco comments:     2nd hand exposure/parents smoked    Vaping Use    Vaping status: Never Used   Substance and Sexual Activity    Alcohol use: No     Alcohol/week: 0.0 oz    Drug use: No    Sexual activity: Not on file   Other Topics Concern    Not on file   Social History Narrative    Not on file     Social Drivers of Health     Financial Resource Strain: Not on file   Food Insecurity: Not on file   Transportation Needs: Not on file   Physical Activity: Not on file   Stress: Not on file   Social Connections: Not on file   Intimate Partner Violence: Not on file   Housing Stability: Not on file       Family History   Problem Relation Age of Onset    Heart Disease Mother     Hyperlipidemia Mother     Hypertension Mother     Stroke Mother     Other Father         Plane crash    No Known Problems Sister     Heart Attack Maternal Aunt     Heart Attack Maternal Grandmother        Medications Ordered Prior to Encounter[2]    Patient has no known allergies.      ROS:   Review of Systems   Constitutional:  Negative for chills, diaphoresis, fever, malaise/fatigue and weight loss.   HENT:  Negative for congestion, ear discharge, ear pain, hearing loss, nosebleeds, sinus pain, sore throat and tinnitus.    Eyes:  Negative for blurred vision, double vision, photophobia, pain, discharge and redness.   Respiratory:  Negative for cough, hemoptysis, sputum production, shortness of breath, wheezing and stridor.    Cardiovascular:  Negative for chest pain, palpitations, orthopnea, claudication, leg swelling and PND.   Gastrointestinal:  Negative for abdominal pain, constipation, diarrhea, heartburn, nausea and vomiting.   Genitourinary:  Negative for dysuria and urgency.   Musculoskeletal:  Negative for back pain, falls, joint pain, myalgias and neck pain.   Skin:  Negative for itching and  "rash.   Neurological:  Negative for dizziness, tremors, speech change, focal weakness, weakness and headaches.   Endo/Heme/Allergies:  Negative for environmental allergies.   Psychiatric/Behavioral:  Negative for depression.        /60 (BP Location: Right arm, Patient Position: Sitting, BP Cuff Size: Adult)   Pulse 84   Ht 1.626 m (5' 4\")   Wt 75.8 kg (167 lb)   SpO2 97%   Physical Exam  Constitutional:       General: She is not in acute distress.     Appearance: Normal appearance. She is well-developed and normal weight.   HENT:      Head: Normocephalic and atraumatic.      Right Ear: External ear normal.      Left Ear: External ear normal.      Nose: Nose normal. No congestion.      Mouth/Throat:      Mouth: Mucous membranes are moist.      Pharynx: Oropharynx is clear. No oropharyngeal exudate.   Eyes:      General: No scleral icterus.     Extraocular Movements: Extraocular movements intact.      Conjunctiva/sclera: Conjunctivae normal.      Pupils: Pupils are equal, round, and reactive to light.   Neck:      Vascular: No JVD.      Trachea: No tracheal deviation.   Cardiovascular:      Rate and Rhythm: Normal rate and regular rhythm.      Heart sounds: Normal heart sounds. No murmur heard.     No friction rub. No gallop.   Pulmonary:      Effort: Pulmonary effort is normal. No accessory muscle usage or respiratory distress.      Breath sounds: Normal breath sounds. No wheezing or rales.   Abdominal:      General: There is no distension.      Palpations: Abdomen is soft.      Tenderness: There is no abdominal tenderness.   Musculoskeletal:         General: No tenderness or deformity. Normal range of motion.      Cervical back: Normal range of motion and neck supple.      Right lower leg: No edema.      Left lower leg: No edema.   Lymphadenopathy:      Cervical: No cervical adenopathy.   Skin:     General: Skin is warm and dry.      Findings: No rash.      Nails: There is no clubbing.   Neurological:      " Mental Status: She is alert and oriented to person, place, and time.      Cranial Nerves: No cranial nerve deficit.      Gait: Gait normal.   Psychiatric:         Behavior: Behavior normal.       PFTs as reviewed by me personally: as per hPI    Imaging as reviewed by me personally:  as per HPI    Assessment:  1. Obstructive lung disease (generalized) (HCC)  Budeson-Glycopyrrol-Formoterol (BREZTRI AEROSPHERE) 160-9-4.8 MCG/ACT Aerosol      2. Mild intermittent asthma without complication  albuterol (PROAIR HFA) 108 (90 Base) MCG/ACT Aero Soln inhalation aerosol    azithromycin (ZITHROMAX) 250 MG Tab    methylPREDNISolone (MEDROL DOSEPAK) 4 MG Tablet Therapy Pack    DISCONTINUED: methylPREDNISolone (MEDROL DOSEPAK) 4 MG Tablet Therapy Pack    DISCONTINUED: azithromycin (ZITHROMAX) 250 MG Tab          Plan:  Normal PFT on breztri one puff BID with good sx control.  Chronic. Stable. Sxs controlled on effectively low dose ICS/LABA/LAMA. Encouraged her to increase to 2 puff BID for increased sxs before taking oral steroid/abx. Medrol dose pack and azithromycin provided for travel If needed.   Return in about 6 months (around 12/9/2025) for any provider for asthma.             [1]   Past Medical History:  Diagnosis Date    HLD (hyperlipidemia) 07/07/2015    Mild intermittent asthma without complication 07/19/2016    Thalassemia minor    [2]   Current Outpatient Medications on File Prior to Visit   Medication Sig Dispense Refill    rosuvastatin (CRESTOR) 10 MG Tab       ipratropium-albuterol (DUONEB) 0.5-2.5 (3) MG/3ML nebulizer solution Take 3 mL by nebulization every 6 hours as needed for Shortness of Breath. 180 Each 4    estradiol (ESTRACE) 1 MG Tab Take 1 mg by mouth every day.      methylPREDNISolone (MEDROL DOSEPAK) 4 MG Tablet Therapy Pack Take as directed. 21 Tablet 0    montelukast (SINGULAIR) 10 MG Tab Take 1 Tablet by mouth every day. 90 Tablet 3    omeprazole (PRILOSEC) 40 MG delayed-release capsule Take 1 Cap  by mouth every day. 90 Cap 3    Lifitegrast (XIIDRA) 5 % Solution Administer  into affected eye(s).      Propylene Glycol (VISTA MEIBO TEARS) 0.6 % Solution Administer  into affected eye(s).      Probiotic Product (PROBIOTIC-10 PO) Take  by mouth.      Xylitol (XYLIMELTS MT) Use  in the mouth or throat.      Epinastine HCl 0.05 % Solution Administer  into affected eye(s) as needed. PRN      Mouthwashes (BIOTENE/CALCIUM PBF) Liquid Use  in the mouth or throat. And spray      cyanocobalamin (VITAMIN B-12) 1000 MCG/ML Solution Inject 1,000 mcg into the shoulder, thigh, or buttocks. Twice a month      fluticasone (FLONASE SENSIMIST) 27.5 MCG/SPRAY nasal spray Administer 2 Sprays into affected nostril(S) every day.      simvastatin (ZOCOR) 20 MG Tab TAKE 1 TABLET EVERY EVENING (Patient taking differently: Take 20 mg by mouth every evening. TAKE 1 TABLET EVERY EVENING) 90 Tab 3     No current facility-administered medications on file prior to visit.

## 2025-06-11 ENCOUNTER — PATIENT MESSAGE (OUTPATIENT)
Dept: SLEEP MEDICINE | Facility: MEDICAL CENTER | Age: 75
End: 2025-06-11
Payer: MEDICARE

## 2025-06-11 DIAGNOSIS — J44.9 OBSTRUCTIVE LUNG DISEASE (GENERALIZED) (HCC): ICD-10-CM

## 2025-07-01 ENCOUNTER — APPOINTMENT (OUTPATIENT)
Dept: URBAN - METROPOLITAN AREA CLINIC 6 | Facility: CLINIC | Age: 75
Setting detail: DERMATOLOGY
End: 2025-07-01

## 2025-07-01 DIAGNOSIS — L72.8 OTHER FOLLICULAR CYSTS OF THE SKIN AND SUBCUTANEOUS TISSUE: ICD-10-CM

## 2025-07-01 DIAGNOSIS — Z71.89 OTHER SPECIFIED COUNSELING: ICD-10-CM

## 2025-07-01 DIAGNOSIS — Z85.828 PERSONAL HISTORY OF OTHER MALIGNANT NEOPLASM OF SKIN: ICD-10-CM

## 2025-07-01 DIAGNOSIS — L57.0 ACTINIC KERATOSIS: ICD-10-CM

## 2025-07-01 DIAGNOSIS — D22 MELANOCYTIC NEVI: ICD-10-CM

## 2025-07-01 DIAGNOSIS — D18.0 HEMANGIOMA: ICD-10-CM

## 2025-07-01 DIAGNOSIS — D492 NEOPLASM OF UNSPECIFIED NATURE OF BONE, SOFT TISSUE, AND SKIN: ICD-10-CM

## 2025-07-01 DIAGNOSIS — L82.1 OTHER SEBORRHEIC KERATOSIS: ICD-10-CM

## 2025-07-01 DIAGNOSIS — D485 NEOPLASM OF UNCERTAIN BEHAVIOR OF SKIN: ICD-10-CM | Status: INADEQUATELY CONTROLLED

## 2025-07-01 DIAGNOSIS — L66.12 FRONTAL FIBROSING ALOPECIA: ICD-10-CM

## 2025-07-01 DIAGNOSIS — L81.4 OTHER MELANIN HYPERPIGMENTATION: ICD-10-CM

## 2025-07-01 PROBLEM — D22.0 MELANOCYTIC NEVI OF LIP: Status: ACTIVE | Noted: 2025-07-01

## 2025-07-01 PROBLEM — D23.72 OTHER BENIGN NEOPLASM OF SKIN OF LEFT LOWER LIMB, INCLUDING HIP: Status: ACTIVE | Noted: 2025-07-01

## 2025-07-01 PROBLEM — D18.01 HEMANGIOMA OF SKIN AND SUBCUTANEOUS TISSUE: Status: ACTIVE | Noted: 2025-07-01

## 2025-07-01 PROBLEM — L65.9 NONSCARRING HAIR LOSS, UNSPECIFIED: Status: ACTIVE | Noted: 2025-07-01

## 2025-07-01 PROBLEM — R22.42 LOCALIZED SWELLING, MASS AND LUMP, LEFT LOWER LIMB: Status: ACTIVE | Noted: 2025-07-01

## 2025-07-01 PROBLEM — D48.5 NEOPLASM OF UNCERTAIN BEHAVIOR OF SKIN: Status: ACTIVE | Noted: 2025-07-01

## 2025-07-01 PROBLEM — D22.5 MELANOCYTIC NEVI OF TRUNK: Status: ACTIVE | Noted: 2025-07-01

## 2025-07-01 PROBLEM — D22.39 MELANOCYTIC NEVI OF OTHER PARTS OF FACE: Status: ACTIVE | Noted: 2025-07-01

## 2025-07-01 PROCEDURE — ? SUNSCREEN TREATMENT REGIMEN

## 2025-07-01 PROCEDURE — ? DIAGNOSIS COMMENT

## 2025-07-01 PROCEDURE — ? ORDER ULTRASOUND

## 2025-07-01 PROCEDURE — ? COUNSELING

## 2025-07-01 PROCEDURE — ? BIOPSY BY SHAVE METHOD

## 2025-07-01 PROCEDURE — ? LIQUID NITROGEN

## 2025-07-01 PROCEDURE — ? SUNSCREEN RECOMMENDATIONS

## 2025-07-01 PROCEDURE — ? PRESCRIPTION MEDICATION MANAGEMENT

## 2025-07-01 PROCEDURE — ? ADDITIONAL NOTES

## 2025-07-01 ASSESSMENT — LOCATION DETAILED DESCRIPTION DERM
LOCATION DETAILED: LEFT DORSAL MIDDLE METACARPOPHALANGEAL JOINT
LOCATION DETAILED: SUBXIPHOID
LOCATION DETAILED: EPIGASTRIC SKIN
LOCATION DETAILED: RIGHT FOREHEAD
LOCATION DETAILED: LEFT NASOLABIAL FOLD
LOCATION DETAILED: RIGHT MEDIAL FRONTAL SCALP
LOCATION DETAILED: LEFT ANTERIOR LATERAL PROXIMAL THIGH
LOCATION DETAILED: MID-FRONTAL SCALP
LOCATION DETAILED: RIGHT RADIAL DORSAL HAND
LOCATION DETAILED: LEFT INFERIOR MEDIAL MALAR CHEEK
LOCATION DETAILED: MIDDLE STERNUM
LOCATION DETAILED: LEFT PROXIMAL DORSAL FOREARM
LOCATION DETAILED: LEFT CENTRAL TEMPLE
LOCATION DETAILED: LEFT POSTERIOR LATERAL PROXIMAL THIGH

## 2025-07-01 ASSESSMENT — LOCATION SIMPLE DESCRIPTION DERM
LOCATION SIMPLE: RIGHT FOREHEAD
LOCATION SIMPLE: ABDOMEN
LOCATION SIMPLE: LEFT LIP
LOCATION SIMPLE: LEFT CHEEK
LOCATION SIMPLE: LEFT TEMPLE
LOCATION SIMPLE: LEFT HAND
LOCATION SIMPLE: CHEST
LOCATION SIMPLE: LEFT FOREARM
LOCATION SIMPLE: ANTERIOR SCALP
LOCATION SIMPLE: LEFT THIGH
LOCATION SIMPLE: RIGHT HAND
LOCATION SIMPLE: RIGHT SCALP

## 2025-07-01 ASSESSMENT — LOCATION ZONE DERM
LOCATION ZONE: LEG
LOCATION ZONE: LIP
LOCATION ZONE: ARM
LOCATION ZONE: HAND
LOCATION ZONE: SCALP
LOCATION ZONE: FACE
LOCATION ZONE: TRUNK

## 2025-07-01 NOTE — PROCEDURE: ADDITIONAL NOTES
Detail Level: Simple
Render Risk Assessment In Note?: no
Additional Notes: 7/1/25 pt reports lots of shedding after starting scalp tx

## 2025-07-01 NOTE — PROCEDURE: ORDER ULTRASOUND
Ultrasound Protocol: Ultrasound of Subcutaneous Mass
Priority: normal
Provider: Maggie Richards MD
Detail Level: Simple
Time Frame Unit: day(s)
hurts

## 2025-07-01 NOTE — PROCEDURE: BIOPSY BY SHAVE METHOD
Detail Level: Detailed
Depth Of Biopsy: dermis
Was A Bandage Applied: Yes
Size Of Lesion In Cm: 0
Biopsy Type: H and E
Biopsy Method: Dermablade
Anesthesia Type: 1% lidocaine with epinephrine and a 1:10 solution of 8.4% sodium bicarbonate
Anesthesia Volume In Cc: 0.5
Hemostasis: Drysol and Electrocautery
Wound Care: Vaseline
Dressing: no dressing applied
Destruction After The Procedure: No
Type Of Destruction Used: Curettage
Curettage Text: The wound bed was treated with curettage after the biopsy was performed.
Cryotherapy Text: The wound bed was treated with cryotherapy after the biopsy was performed.
Electrodesiccation Text: The wound bed was treated with electrodesiccation after the biopsy was performed.
Electrodesiccation And Curettage Text: The wound bed was treated with electrodesiccation and curettage after the biopsy was performed.
Silver Nitrate Text: The wound bed was treated with silver nitrate after the biopsy was performed.
Lab: [id number]
Lab Facility: 
Medical Necessity Information: It is in your best interest to select a reason for this procedure from the list below. All of these items fulfill various CMS LCD requirements except the new and changing color options.
Consent: Written consent was obtained and risks were reviewed including but not limited to scarring, infection, bleeding, scabbing, incomplete removal, nerve damage and allergy to anesthesia.
Post-Care Instructions: I reviewed with the patient in detail post-care instructions. Patient is to keep the biopsy site dry overnight, and then apply bacitracin twice daily until healed. Patient may apply hydrogen peroxide soaks to remove any crusting.
Notification Instructions: Patient will be notified of biopsy results. However, patient instructed to call the office if not contacted within 2 weeks.
Billing Type: Third-Party Bill
Information: Selecting Yes will display possible errors in your note based on the variables you have selected. This validation is only offered as a suggestion for you. PLEASE NOTE THAT THE VALIDATION TEXT WILL BE REMOVED WHEN YOU FINALIZE YOUR NOTE. IF YOU WANT TO FAX A PRELIMINARY NOTE YOU WILL NEED TO TOGGLE THIS TO 'NO' IF YOU DO NOT WANT IT IN YOUR FAXED NOTE.

## 2025-07-01 NOTE — PROCEDURE: LIQUID NITROGEN
Render Note In Bullet Format When Appropriate: No
Number Of Freeze-Thaw Cycles: 2 freeze-thaw cycles
Show Aperture Variable?: Yes
Duration Of Freeze Thaw-Cycle (Seconds): 10
Detail Level: Detailed
Post-Care Instructions: I reviewed with the patient in detail post-care instructions. Patient is to wear sunprotection, and avoid picking at any of the treated lesions. Pt may apply Vaseline to crusted or scabbing areas.
Consent: The patient's consent was obtained including but not limited to risks of crusting, scabbing, blistering, scarring, darker or lighter pigmentary change, recurrence, incomplete removal and infection.

## 2025-07-01 NOTE — PROCEDURE: PRESCRIPTION MEDICATION MANAGEMENT
Continue Regimen: Clobetasol 0.05 % scalp solution Bid\\nKetoconazole 2 % shampoo Qd\\nMinoxidil 2.5 mg tablet Qd
Render In Strict Bullet Format?: No
Detail Level: Zone

## 2025-07-22 ENCOUNTER — APPOINTMENT (OUTPATIENT)
Dept: URBAN - METROPOLITAN AREA CLINIC 36 | Facility: CLINIC | Age: 75
Setting detail: DERMATOLOGY
End: 2025-07-22

## 2025-07-22 PROBLEM — C44.41 BASAL CELL CARCINOMA OF SKIN OF SCALP AND NECK: Status: ACTIVE | Noted: 2025-07-22

## 2025-07-22 PROCEDURE — ? CONSULTATION FOR MOHS SURGERY

## 2025-07-23 ENCOUNTER — HOSPITAL ENCOUNTER (OUTPATIENT)
Dept: RADIOLOGY | Facility: MEDICAL CENTER | Age: 75
End: 2025-07-23
Attending: DERMATOLOGY
Payer: MEDICARE

## 2025-07-23 DIAGNOSIS — R22.42 MASS OF LOWER LEG, LEFT: ICD-10-CM

## 2025-07-23 PROCEDURE — 76882 US LMTD JT/FCL EVL NVASC XTR: CPT | Mod: LT

## 2025-07-28 PROBLEM — M16.11 PRIMARY OSTEOARTHRITIS OF RIGHT HIP: Status: ACTIVE | Noted: 2025-07-28

## 2025-07-29 ENCOUNTER — HOSPITAL ENCOUNTER (OUTPATIENT)
Dept: LAB | Facility: MEDICAL CENTER | Age: 75
End: 2025-07-29
Attending: FAMILY MEDICINE
Payer: MEDICARE

## 2025-07-29 LAB
ALBUMIN SERPL BCP-MCNC: 4.2 G/DL (ref 3.2–4.9)
ALBUMIN/GLOB SERPL: 1.8 G/DL
ALP SERPL-CCNC: 58 U/L (ref 30–99)
ALT SERPL-CCNC: 18 U/L (ref 2–50)
ANION GAP SERPL CALC-SCNC: 13 MMOL/L (ref 7–16)
AST SERPL-CCNC: 23 U/L (ref 12–45)
BASOPHILS # BLD AUTO: 0.8 % (ref 0–1.8)
BASOPHILS # BLD: 0.04 K/UL (ref 0–0.12)
BILIRUB SERPL-MCNC: 0.8 MG/DL (ref 0.1–1.5)
BUN SERPL-MCNC: 15 MG/DL (ref 8–22)
CALCIUM ALBUM COR SERPL-MCNC: 9.1 MG/DL (ref 8.5–10.5)
CALCIUM SERPL-MCNC: 9.3 MG/DL (ref 8.5–10.5)
CHLORIDE SERPL-SCNC: 106 MMOL/L (ref 96–112)
CHOLEST SERPL-MCNC: 151 MG/DL (ref 100–199)
CO2 SERPL-SCNC: 23 MMOL/L (ref 20–33)
CREAT SERPL-MCNC: 0.78 MG/DL (ref 0.5–1.4)
EOSINOPHIL # BLD AUTO: 0.09 K/UL (ref 0–0.51)
EOSINOPHIL NFR BLD: 1.7 % (ref 0–6.9)
ERYTHROCYTE [DISTWIDTH] IN BLOOD BY AUTOMATED COUNT: 35.6 FL (ref 35.9–50)
EST. AVERAGE GLUCOSE BLD GHB EST-MCNC: 108 MG/DL
FASTING STATUS PATIENT QL REPORTED: NORMAL
FERRITIN SERPL-MCNC: 25.3 NG/ML (ref 10–291)
GFR SERPLBLD CREATININE-BSD FMLA CKD-EPI: 79 ML/MIN/1.73 M 2
GLOBULIN SER CALC-MCNC: 2.3 G/DL (ref 1.9–3.5)
GLUCOSE SERPL-MCNC: 90 MG/DL (ref 65–99)
HBA1C MFR BLD: 5.4 % (ref 4–5.6)
HCT VFR BLD AUTO: 34.7 % (ref 37–47)
HDLC SERPL-MCNC: 62 MG/DL
HGB BLD-MCNC: 10.7 G/DL (ref 12–16)
IMM GRANULOCYTES # BLD AUTO: 0.01 K/UL (ref 0–0.11)
IMM GRANULOCYTES NFR BLD AUTO: 0.2 % (ref 0–0.9)
LDLC SERPL CALC-MCNC: 73 MG/DL
LYMPHOCYTES # BLD AUTO: 1.82 K/UL (ref 1–4.8)
LYMPHOCYTES NFR BLD: 35.3 % (ref 22–41)
MCH RBC QN AUTO: 20.2 PG (ref 27–33)
MCHC RBC AUTO-ENTMCNC: 30.8 G/DL (ref 32.2–35.5)
MCV RBC AUTO: 65.5 FL (ref 81.4–97.8)
MONOCYTES # BLD AUTO: 0.36 K/UL (ref 0–0.85)
MONOCYTES NFR BLD AUTO: 7 % (ref 0–13.4)
NEUTROPHILS # BLD AUTO: 2.83 K/UL (ref 1.82–7.42)
NEUTROPHILS NFR BLD: 55 % (ref 44–72)
NRBC # BLD AUTO: 0 K/UL
NRBC BLD-RTO: 0 /100 WBC (ref 0–0.2)
PLATELET # BLD AUTO: 274 K/UL (ref 164–446)
POTASSIUM SERPL-SCNC: 4.2 MMOL/L (ref 3.6–5.5)
PROT SERPL-MCNC: 6.5 G/DL (ref 6–8.2)
RBC # BLD AUTO: 5.3 M/UL (ref 4.2–5.4)
SODIUM SERPL-SCNC: 142 MMOL/L (ref 135–145)
T3 SERPL-MCNC: 101 NG/DL (ref 60–181)
T4 FREE SERPL-MCNC: 1.11 NG/DL (ref 0.93–1.7)
TRIGL SERPL-MCNC: 79 MG/DL (ref 0–149)
TSH SERPL-ACNC: 3.06 UIU/ML (ref 0.38–5.33)
WBC # BLD AUTO: 5.2 K/UL (ref 4.8–10.8)

## 2025-07-29 PROCEDURE — 84439 ASSAY OF FREE THYROXINE: CPT

## 2025-07-29 PROCEDURE — 82728 ASSAY OF FERRITIN: CPT

## 2025-07-29 PROCEDURE — 85025 COMPLETE CBC W/AUTO DIFF WBC: CPT

## 2025-07-29 PROCEDURE — 84480 ASSAY TRIIODOTHYRONINE (T3): CPT

## 2025-07-29 PROCEDURE — 80061 LIPID PANEL: CPT

## 2025-07-29 PROCEDURE — 82043 UR ALBUMIN QUANTITATIVE: CPT

## 2025-07-29 PROCEDURE — 84443 ASSAY THYROID STIM HORMONE: CPT

## 2025-07-29 PROCEDURE — 83036 HEMOGLOBIN GLYCOSYLATED A1C: CPT | Mod: GA

## 2025-07-29 PROCEDURE — 82570 ASSAY OF URINE CREATININE: CPT

## 2025-07-29 PROCEDURE — 80053 COMPREHEN METABOLIC PANEL: CPT

## 2025-07-29 PROCEDURE — 36415 COLL VENOUS BLD VENIPUNCTURE: CPT

## 2025-07-31 LAB
COLLECT DURATION TIME SPEC: NORMAL HR
CREAT 24H UR-MCNC: 43 MG/DL
MICROALBUMIN 24H UR-MCNC: <0.3 MG/DL
MICROALBUMIN/CREAT 24H UR: <7 MG/G (ref 0–30)
SPECIMEN VOL ?TM UR: NORMAL ML

## 2025-08-25 RX ADMIN — DOXYCYCLINE: 100 TABLET, FILM COATED ORAL at 00:00

## 2025-08-26 ENCOUNTER — APPOINTMENT (OUTPATIENT)
Dept: URBAN - METROPOLITAN AREA CLINIC 36 | Facility: CLINIC | Age: 75
Setting detail: DERMATOLOGY
End: 2025-08-26

## 2025-08-26 PROCEDURE — ? PRESCRIPTION

## 2025-08-26 PROCEDURE — ? MOHS SURGERY

## 2025-08-26 RX ORDER — DOXYCYCLINE 100 MG/1
TABLET, FILM COATED ORAL
Qty: 14 | Refills: 0 | Status: ERX | COMMUNITY
Start: 2025-08-25